# Patient Record
Sex: FEMALE | Race: WHITE | NOT HISPANIC OR LATINO | Employment: OTHER | URBAN - METROPOLITAN AREA
[De-identification: names, ages, dates, MRNs, and addresses within clinical notes are randomized per-mention and may not be internally consistent; named-entity substitution may affect disease eponyms.]

---

## 2018-07-17 ENCOUNTER — OFFICE VISIT (OUTPATIENT)
Dept: INTERNAL MEDICINE | Facility: CLINIC | Age: 83
End: 2018-07-17

## 2018-07-17 VITALS
SYSTOLIC BLOOD PRESSURE: 130 MMHG | HEART RATE: 72 BPM | HEIGHT: 60 IN | BODY MASS INDEX: 23.95 KG/M2 | WEIGHT: 122 LBS | DIASTOLIC BLOOD PRESSURE: 80 MMHG

## 2018-07-17 DIAGNOSIS — I10 ESSENTIAL HYPERTENSION: Primary | ICD-10-CM

## 2018-07-17 DIAGNOSIS — G25.0 BENIGN ESSENTIAL TREMOR: ICD-10-CM

## 2018-07-17 DIAGNOSIS — E55.9 VITAMIN D DEFICIENCY: ICD-10-CM

## 2018-07-17 DIAGNOSIS — K21.9 GASTROESOPHAGEAL REFLUX DISEASE WITHOUT ESOPHAGITIS: ICD-10-CM

## 2018-07-17 DIAGNOSIS — F32.A DEPRESSION, UNSPECIFIED DEPRESSION TYPE: ICD-10-CM

## 2018-07-17 DIAGNOSIS — K59.09 OTHER CONSTIPATION: ICD-10-CM

## 2018-07-17 DIAGNOSIS — G47.09 OTHER INSOMNIA: ICD-10-CM

## 2018-07-17 LAB
25(OH)D3 SERPL-MCNC: 30.3 NG/ML
ALBUMIN SERPL-MCNC: 4 G/DL (ref 3.2–4.8)
ALBUMIN/GLOB SERPL: 1.4 G/DL (ref 1.5–2.5)
ALP SERPL-CCNC: 80 U/L (ref 25–100)
ALT SERPL W P-5'-P-CCNC: 19 U/L (ref 7–40)
ANION GAP SERPL CALCULATED.3IONS-SCNC: 4 MMOL/L (ref 3–11)
AST SERPL-CCNC: 16 U/L (ref 0–33)
BILIRUB BLD-MCNC: NEGATIVE MG/DL
BILIRUB SERPL-MCNC: 0.3 MG/DL (ref 0.3–1.2)
BUN BLD-MCNC: 11 MG/DL (ref 9–23)
BUN/CREAT SERPL: 22.9 (ref 7–25)
CALCIUM SPEC-SCNC: 9 MG/DL (ref 8.7–10.4)
CHLORIDE SERPL-SCNC: 100 MMOL/L (ref 99–109)
CLARITY, POC: CLEAR
CO2 SERPL-SCNC: 30 MMOL/L (ref 20–31)
COLOR UR: YELLOW
CREAT BLD-MCNC: 0.48 MG/DL (ref 0.6–1.3)
DEPRECATED RDW RBC AUTO: 43.5 FL (ref 37–54)
ERYTHROCYTE [DISTWIDTH] IN BLOOD BY AUTOMATED COUNT: 13.3 % (ref 11.3–14.5)
GFR SERPL CREATININE-BSD FRML MDRD: 122 ML/MIN/1.73
GLOBULIN UR ELPH-MCNC: 2.8 GM/DL
GLUCOSE BLD-MCNC: 90 MG/DL (ref 70–100)
GLUCOSE UR STRIP-MCNC: NEGATIVE MG/DL
HCT VFR BLD AUTO: 41.9 % (ref 34.5–44)
HGB BLD-MCNC: 14.2 G/DL (ref 11.5–15.5)
KETONES UR QL: NEGATIVE
LEUKOCYTE EST, POC: NEGATIVE
MCH RBC QN AUTO: 30.3 PG (ref 27–31)
MCHC RBC AUTO-ENTMCNC: 33.9 G/DL (ref 32–36)
MCV RBC AUTO: 89.5 FL (ref 80–99)
NITRITE UR-MCNC: NEGATIVE MG/ML
PH UR: 8 [PH] (ref 5–8)
PLATELET # BLD AUTO: 354 10*3/MM3 (ref 150–450)
PMV BLD AUTO: 10.8 FL (ref 6–12)
POTASSIUM BLD-SCNC: 4.6 MMOL/L (ref 3.5–5.5)
PROT SERPL-MCNC: 6.8 G/DL (ref 5.7–8.2)
PROT UR STRIP-MCNC: NEGATIVE MG/DL
RBC # BLD AUTO: 4.68 10*6/MM3 (ref 3.89–5.14)
RBC # UR STRIP: NEGATIVE /UL
SODIUM BLD-SCNC: 134 MMOL/L (ref 132–146)
SP GR UR: 1.01 (ref 1–1.03)
TSH SERPL DL<=0.05 MIU/L-ACNC: 1.1 MIU/ML (ref 0.35–5.35)
UROBILINOGEN UR QL: NORMAL
VIT B12 BLD-MCNC: 652 PG/ML (ref 211–911)
WBC NRBC COR # BLD: 7.67 10*3/MM3 (ref 3.5–10.8)

## 2018-07-17 PROCEDURE — 82607 VITAMIN B-12: CPT | Performed by: INTERNAL MEDICINE

## 2018-07-17 PROCEDURE — 99204 OFFICE O/P NEW MOD 45 MIN: CPT | Performed by: INTERNAL MEDICINE

## 2018-07-17 PROCEDURE — 82306 VITAMIN D 25 HYDROXY: CPT | Performed by: INTERNAL MEDICINE

## 2018-07-17 PROCEDURE — 81003 URINALYSIS AUTO W/O SCOPE: CPT | Performed by: INTERNAL MEDICINE

## 2018-07-17 PROCEDURE — 85027 COMPLETE CBC AUTOMATED: CPT | Performed by: INTERNAL MEDICINE

## 2018-07-17 PROCEDURE — 80053 COMPREHEN METABOLIC PANEL: CPT | Performed by: INTERNAL MEDICINE

## 2018-07-17 PROCEDURE — 84443 ASSAY THYROID STIM HORMONE: CPT | Performed by: INTERNAL MEDICINE

## 2018-07-17 RX ORDER — TRAZODONE HYDROCHLORIDE 50 MG/1
TABLET ORAL NIGHTLY PRN
COMMUNITY
Start: 2018-06-20 | End: 2019-08-08 | Stop reason: SDUPTHER

## 2018-07-17 RX ORDER — BIMATOPROST 0.01 %
DROPS OPHTHALMIC (EYE)
COMMUNITY
Start: 2018-06-22 | End: 2020-11-03

## 2018-07-17 RX ORDER — ESCITALOPRAM OXALATE 20 MG/1
TABLET ORAL DAILY
COMMUNITY
Start: 2018-06-20 | End: 2018-10-22

## 2018-07-17 RX ORDER — DORZOLAMIDE HYDROCHLORIDE AND TIMOLOL MALEATE 20; 5 MG/ML; MG/ML
SOLUTION/ DROPS OPHTHALMIC
COMMUNITY
Start: 2018-05-07

## 2018-07-17 RX ORDER — PRIMIDONE 250 MG/1
TABLET ORAL
COMMUNITY
Start: 2018-07-02 | End: 2018-09-06 | Stop reason: SDUPTHER

## 2018-07-17 RX ORDER — PRIMIDONE 50 MG/1
TABLET ORAL NIGHTLY
COMMUNITY
Start: 2018-06-16 | End: 2018-09-11 | Stop reason: SDUPTHER

## 2018-07-17 RX ORDER — LORAZEPAM 0.5 MG/1
0.5 TABLET ORAL DAILY PRN
COMMUNITY
End: 2019-08-19

## 2018-07-17 RX ORDER — CLONAZEPAM 0.5 MG/1
0.5 TABLET ORAL NIGHTLY PRN
COMMUNITY
End: 2020-02-25 | Stop reason: SDUPTHER

## 2018-07-17 RX ORDER — AMLODIPINE BESYLATE 2.5 MG/1
TABLET ORAL DAILY
COMMUNITY
Start: 2018-07-05 | End: 2018-12-07 | Stop reason: SDUPTHER

## 2018-07-17 NOTE — PROGRESS NOTES
Patient is a 87 y.o. female who is here to establish care for gastric problems.  Chief Complaint   Patient presents with   • Establish Care     gastric problems         HPI:    Here to establish care.  Was seen by Dr Mancia.  Today c/o stomach problems.  Onset about 1.5 years ago when her  got sick.  Some constipation.  Using fiber with some improvement.  No emesis.  Some nausea.  No wt loss. No BRBPR.  Tries to drink more water during the day.  Has problems in the past with hemorrhoids.      History:    Patient Active Problem List   Diagnosis   • Gastroesophageal reflux disease without esophagitis   • Depression   • Essential hypertension   • Other insomnia   • Benign essential tremor   • Other constipation       Past Medical History:   Diagnosis Date   • Cystocele, midline    • Hemorrhoids        Past Surgical History:   Procedure Laterality Date   • BREAST BIOPSY      cyst removed   • CATARACT EXTRACTION Bilateral 2005   • THYROID SURGERY      nodule removal, Benign       No current outpatient prescriptions on file prior to visit.     No current facility-administered medications on file prior to visit.        Family History   Problem Relation Age of Onset   • Diabetes Mother    • Heart attack Father    • Diabetes Sister    • Breast cancer Daughter        Social History     Social History   • Marital status:      Spouse name: N/A   • Number of children: N/A   • Years of education: N/A     Occupational History   • Not on file.     Social History Main Topics   • Smoking status: Not on file   • Smokeless tobacco: Not on file   • Alcohol use Not on file   • Drug use: Unknown   • Sexual activity: Not on file     Other Topics Concern   • Not on file     Social History Narrative   • No narrative on file         Review of Systems   Constitutional: Negative for chills, fatigue, fever and unexpected weight change.   HENT: Negative for congestion, ear pain, hearing loss, rhinorrhea, sinus pressure, sore throat  "and trouble swallowing.    Eyes: Negative for discharge and itching.   Respiratory: Negative for cough, chest tightness and shortness of breath.    Cardiovascular: Negative for chest pain, palpitations and leg swelling.   Gastrointestinal: Positive for constipation. Negative for abdominal pain, blood in stool, diarrhea and vomiting.        Colonoscopy 2013 normal   Endocrine: Positive for cold intolerance. Negative for polydipsia and polyuria.   Genitourinary: Positive for frequency. Negative for difficulty urinating, dysuria, enuresis, hematuria and urgency.   Musculoskeletal: Positive for arthralgias. Negative for back pain, gait problem and joint swelling.   Skin: Negative for rash and wound.   Allergic/Immunologic: Negative for immunocompromised state.   Neurological: Positive for numbness. Negative for dizziness, syncope, weakness, light-headedness and headaches.   Hematological: Does not bruise/bleed easily.   Psychiatric/Behavioral: Positive for dysphoric mood. Negative for behavioral problems and sleep disturbance. The patient is nervous/anxious.        /80   Pulse 72   Ht 152.4 cm (60\")   Wt 55.3 kg (122 lb)   BMI 23.83 kg/m²       Physical Exam   Constitutional: She is oriented to person, place, and time. She appears well-developed and well-nourished.   HENT:   Head: Normocephalic and atraumatic.   Right Ear: External ear normal.   Left Ear: External ear normal.   Mouth/Throat: Oropharynx is clear and moist.   Eyes: Conjunctivae and EOM are normal.   Neck: Normal range of motion. Neck supple.   Cardiovascular: Normal rate, regular rhythm and normal heart sounds.    Pulmonary/Chest: Effort normal and breath sounds normal.   Abdominal: Soft. Bowel sounds are normal.   Musculoskeletal: Normal range of motion. She exhibits edema (trace) and deformity (kyphosis).   Lymphadenopathy:     She has no cervical adenopathy.   Neurological: She is alert and oriented to person, place, and time.   Skin: Skin is " warm and dry.   Psychiatric: She has a normal mood and affect. Her behavior is normal. Thought content normal.       Procedure:      Discussion/Summary:    HTN-stable  Constipation-add citrucel qd  GERD-cont zantac  Tremor-labs today  Depression-stable on current tx  Insomnia-stable  High risk meds-labs today    Labs noted and dw patient, advised to start Vit D 2000 IU qd        Current Outpatient Prescriptions:   •  amLODIPine (NORVASC) 2.5 MG tablet, Daily., Disp: , Rfl:   •  clonazePAM (KlonoPIN) 0.5 MG tablet, Take 0.5 mg by mouth At Night As Needed for Anxiety or Seizures., Disp: , Rfl:   •  dorzolamide-timolol (COSOPT) 22.3-6.8 MG/ML ophthalmic solution, , Disp: , Rfl:   •  escitalopram (LEXAPRO) 20 MG tablet, Daily., Disp: , Rfl:   •  LORazepam (ATIVAN) 0.5 MG tablet, Take 0.5 mg by mouth Daily As Needed for Anxiety., Disp: , Rfl:   •  LUMIGAN 0.01 % ophthalmic drops, , Disp: , Rfl:   •  primidone (MYSOLINE) 250 MG tablet, , Disp: , Rfl:   •  primidone (MYSOLINE) 50 MG tablet, Every Night., Disp: , Rfl:   •  traZODone (DESYREL) 50 MG tablet, At Night As Needed., Disp: , Rfl:         Chula was seen today for establish care.    Diagnoses and all orders for this visit:    Essential hypertension  -     CBC (No Diff)  -     Comprehensive Metabolic Panel  -     TSH  -     POC Urinalysis Dipstick, Automated    Gastroesophageal reflux disease without esophagitis    Other constipation    Benign essential tremor    Depression, unspecified depression type  -     TSH  -     Vitamin B12    Other insomnia    Vitamin D deficiency  -     Vitamin D 25 Hydroxy

## 2018-08-15 ENCOUNTER — TELEPHONE (OUTPATIENT)
Dept: INTERNAL MEDICINE | Facility: CLINIC | Age: 83
End: 2018-08-15

## 2018-09-06 RX ORDER — PRIMIDONE 250 MG/1
250 TABLET ORAL DAILY
Qty: 90 TABLET | Refills: 2 | Status: SHIPPED | OUTPATIENT
Start: 2018-09-06 | End: 2019-06-05 | Stop reason: SDUPTHER

## 2018-09-11 ENCOUNTER — TELEPHONE (OUTPATIENT)
Dept: INTERNAL MEDICINE | Facility: CLINIC | Age: 83
End: 2018-09-11

## 2018-09-11 RX ORDER — PRIMIDONE 50 MG/1
50 TABLET ORAL 2 TIMES DAILY
Qty: 180 TABLET | Refills: 2 | Status: SHIPPED | OUTPATIENT
Start: 2018-09-11 | End: 2019-06-05 | Stop reason: SDUPTHER

## 2018-10-22 ENCOUNTER — OFFICE VISIT (OUTPATIENT)
Dept: INTERNAL MEDICINE | Facility: CLINIC | Age: 83
End: 2018-10-22

## 2018-10-22 VITALS
DIASTOLIC BLOOD PRESSURE: 70 MMHG | SYSTOLIC BLOOD PRESSURE: 144 MMHG | BODY MASS INDEX: 23.95 KG/M2 | HEART RATE: 64 BPM | HEIGHT: 60 IN | WEIGHT: 122 LBS

## 2018-10-22 DIAGNOSIS — G47.09 OTHER INSOMNIA: ICD-10-CM

## 2018-10-22 DIAGNOSIS — K21.9 GASTROESOPHAGEAL REFLUX DISEASE WITHOUT ESOPHAGITIS: ICD-10-CM

## 2018-10-22 DIAGNOSIS — I10 ESSENTIAL HYPERTENSION: Primary | ICD-10-CM

## 2018-10-22 DIAGNOSIS — G25.0 BENIGN ESSENTIAL TREMOR: ICD-10-CM

## 2018-10-22 DIAGNOSIS — K59.09 OTHER CONSTIPATION: ICD-10-CM

## 2018-10-22 DIAGNOSIS — R60.0 BILATERAL LEG EDEMA: ICD-10-CM

## 2018-10-22 DIAGNOSIS — F32.A DEPRESSION, UNSPECIFIED DEPRESSION TYPE: ICD-10-CM

## 2018-10-22 PROCEDURE — 99214 OFFICE O/P EST MOD 30 MIN: CPT | Performed by: INTERNAL MEDICINE

## 2018-10-22 RX ORDER — CITALOPRAM 40 MG/1
TABLET ORAL
COMMUNITY
Start: 2018-10-08 | End: 2020-04-08 | Stop reason: SDUPTHER

## 2018-10-22 RX ORDER — HYDROCHLOROTHIAZIDE 12.5 MG/1
12.5 TABLET ORAL EVERY MORNING
Qty: 30 TABLET | Refills: 5 | Status: SHIPPED | OUTPATIENT
Start: 2018-10-22 | End: 2019-04-08

## 2018-10-22 RX ORDER — ARIPIPRAZOLE 2 MG/1
TABLET ORAL
COMMUNITY
Start: 2018-10-08 | End: 2020-02-24 | Stop reason: SDUPTHER

## 2018-10-22 NOTE — PROGRESS NOTES
Patient is a 87 y.o. female who is here for a follow up of chronic conditions.  Chief Complaint   Patient presents with   • Hypertension         HPI:    Here for f/u.  Had to go to the fiber pills.  Still with some feeling incomplete emptying.  No nausea or emesis.  Some ankle edema.  No dizziness or lightheadedness.  About the same at the end of the day.  Elevation helps.  Depression is stable.  GERD is controlled.     History:    Patient Active Problem List   Diagnosis   • Gastroesophageal reflux disease without esophagitis   • Depression   • Essential hypertension   • Other insomnia   • Benign essential tremor   • Other constipation   • Bilateral leg edema       Past Medical History:   Diagnosis Date   • Cystocele, midline    • Hemorrhoids        Past Surgical History:   Procedure Laterality Date   • BREAST BIOPSY      cyst removed   • CATARACT EXTRACTION Bilateral 2005   • THYROID SURGERY      nodule removal, Benign       Current Outpatient Prescriptions on File Prior to Visit   Medication Sig   • amLODIPine (NORVASC) 2.5 MG tablet Daily.   • clonazePAM (KlonoPIN) 0.5 MG tablet Take 0.5 mg by mouth At Night As Needed for Anxiety or Seizures.   • dorzolamide-timolol (COSOPT) 22.3-6.8 MG/ML ophthalmic solution    • LORazepam (ATIVAN) 0.5 MG tablet Take 0.5 mg by mouth Daily As Needed for Anxiety.   • LUMIGAN 0.01 % ophthalmic drops    • primidone (MYSOLINE) 250 MG tablet Take 1 tablet by mouth Daily.   • primidone (MYSOLINE) 50 MG tablet Take 1 tablet by mouth 2 (Two) Times a Day.   • traZODone (DESYREL) 50 MG tablet At Night As Needed.   • [DISCONTINUED] escitalopram (LEXAPRO) 20 MG tablet Daily.     No current facility-administered medications on file prior to visit.        Family History   Problem Relation Age of Onset   • Diabetes Mother    • Heart attack Father    • Diabetes Sister    • Breast cancer Daughter        Social History     Social History   • Marital status:      Spouse name: N/A   • Number  "of children: N/A   • Years of education: N/A     Occupational History   • Not on file.     Social History Main Topics   • Smoking status: Former Smoker   • Smokeless tobacco: Never Used   • Alcohol use Not on file   • Drug use: Unknown   • Sexual activity: Not on file     Other Topics Concern   • Not on file     Social History Narrative   • No narrative on file         Review of Systems   Constitutional: Negative for chills, fatigue, fever and unexpected weight change.   HENT: Negative for congestion, ear pain, hearing loss, rhinorrhea, sinus pressure, sore throat and trouble swallowing.    Eyes: Negative for discharge and itching.   Respiratory: Negative for cough, chest tightness and shortness of breath.    Cardiovascular: Positive for leg swelling. Negative for chest pain and palpitations.   Gastrointestinal: Positive for constipation. Negative for abdominal pain, blood in stool, diarrhea and vomiting.        Colonoscopy 2011 normal   Endocrine: Positive for cold intolerance. Negative for polydipsia and polyuria.   Genitourinary: Positive for frequency. Negative for difficulty urinating, dysuria, enuresis, hematuria and urgency.   Musculoskeletal: Positive for arthralgias. Negative for back pain, gait problem and joint swelling.   Skin: Negative for rash and wound.   Allergic/Immunologic: Negative for immunocompromised state.   Neurological: Positive for numbness. Negative for dizziness, syncope, weakness, light-headedness and headaches.   Hematological: Does not bruise/bleed easily.   Psychiatric/Behavioral: Positive for dysphoric mood. Negative for behavioral problems and sleep disturbance. The patient is nervous/anxious.        /70   Pulse 64   Ht 152.4 cm (60\")   Wt 55.3 kg (122 lb)   BMI 23.83 kg/m²       Physical Exam   Constitutional: She is oriented to person, place, and time. She appears well-developed and well-nourished.   HENT:   Head: Normocephalic and atraumatic.   Right Ear: External ear " normal.   Left Ear: External ear normal.   Mouth/Throat: Oropharynx is clear and moist.   Eyes: Conjunctivae and EOM are normal.   Neck: Normal range of motion. Neck supple.   Cardiovascular: Normal rate, regular rhythm and normal heart sounds.    Pulmonary/Chest: Effort normal and breath sounds normal.   Abdominal: Soft. Bowel sounds are normal.   Musculoskeletal: Normal range of motion. She exhibits deformity (kyphosis).   Lymphadenopathy:     She has no cervical adenopathy.   Neurological: She is alert and oriented to person, place, and time.   Skin: Skin is warm and dry.   Psychiatric: She has a normal mood and affect. Her behavior is normal. Thought content normal.       Procedure:      Discussion/Summary:    HTN/edema-add hctz  Constipation-change citrucel qoam  GERD-cont zantac  Tremor-labs soon  Depression-stable on current tx  Insomnia-stable  Vit d def-cont replacement, recheck on rtc  High risk meds-labs on rtc     Labs noted and dw patient    Current Outpatient Prescriptions:   •  amLODIPine (NORVASC) 2.5 MG tablet, Daily., Disp: , Rfl:   •  ARIPiprazole (ABILIFY) 2 MG tablet, , Disp: , Rfl:   •  citalopram (CeleXA) 40 MG tablet, , Disp: , Rfl:   •  clonazePAM (KlonoPIN) 0.5 MG tablet, Take 0.5 mg by mouth At Night As Needed for Anxiety or Seizures., Disp: , Rfl:   •  dorzolamide-timolol (COSOPT) 22.3-6.8 MG/ML ophthalmic solution, , Disp: , Rfl:   •  LORazepam (ATIVAN) 0.5 MG tablet, Take 0.5 mg by mouth Daily As Needed for Anxiety., Disp: , Rfl:   •  LUMIGAN 0.01 % ophthalmic drops, , Disp: , Rfl:   •  primidone (MYSOLINE) 250 MG tablet, Take 1 tablet by mouth Daily., Disp: 90 tablet, Rfl: 2  •  primidone (MYSOLINE) 50 MG tablet, Take 1 tablet by mouth 2 (Two) Times a Day., Disp: 180 tablet, Rfl: 2  •  traZODone (DESYREL) 50 MG tablet, At Night As Needed., Disp: , Rfl:   •  hydrochlorothiazide (HYDRODIURIL) 12.5 MG tablet, Take 1 tablet by mouth Every Morning., Disp: 30 tablet, Rfl: 5        Chula masters  seen today for hypertension.    Diagnoses and all orders for this visit:    Essential hypertension  -     hydrochlorothiazide (HYDRODIURIL) 12.5 MG tablet; Take 1 tablet by mouth Every Morning.    Gastroesophageal reflux disease without esophagitis    Other constipation    Benign essential tremor    Depression, unspecified depression type    Other insomnia    Bilateral leg edema  -     hydrochlorothiazide (HYDRODIURIL) 12.5 MG tablet; Take 1 tablet by mouth Every Morning.

## 2018-12-04 ENCOUNTER — OFFICE VISIT (OUTPATIENT)
Dept: INTERNAL MEDICINE | Facility: CLINIC | Age: 83
End: 2018-12-04

## 2018-12-04 VITALS
HEIGHT: 60 IN | HEART RATE: 68 BPM | DIASTOLIC BLOOD PRESSURE: 74 MMHG | BODY MASS INDEX: 23.95 KG/M2 | SYSTOLIC BLOOD PRESSURE: 124 MMHG | WEIGHT: 122 LBS

## 2018-12-04 DIAGNOSIS — K59.09 OTHER CONSTIPATION: ICD-10-CM

## 2018-12-04 DIAGNOSIS — G47.09 OTHER INSOMNIA: ICD-10-CM

## 2018-12-04 DIAGNOSIS — G25.0 BENIGN ESSENTIAL TREMOR: ICD-10-CM

## 2018-12-04 DIAGNOSIS — I10 ESSENTIAL HYPERTENSION: Primary | ICD-10-CM

## 2018-12-04 DIAGNOSIS — K21.9 GASTROESOPHAGEAL REFLUX DISEASE WITHOUT ESOPHAGITIS: ICD-10-CM

## 2018-12-04 PROCEDURE — 99214 OFFICE O/P EST MOD 30 MIN: CPT | Performed by: INTERNAL MEDICINE

## 2018-12-07 ENCOUNTER — TELEPHONE (OUTPATIENT)
Dept: INTERNAL MEDICINE | Facility: CLINIC | Age: 83
End: 2018-12-07

## 2018-12-07 RX ORDER — AMLODIPINE BESYLATE 2.5 MG/1
2.5 TABLET ORAL DAILY
Qty: 90 TABLET | Refills: 1 | Status: SHIPPED | OUTPATIENT
Start: 2018-12-07 | End: 2019-05-06 | Stop reason: SDUPTHER

## 2019-04-08 ENCOUNTER — OFFICE VISIT (OUTPATIENT)
Dept: INTERNAL MEDICINE | Facility: CLINIC | Age: 84
End: 2019-04-08

## 2019-04-08 VITALS
SYSTOLIC BLOOD PRESSURE: 140 MMHG | HEIGHT: 60 IN | DIASTOLIC BLOOD PRESSURE: 80 MMHG | WEIGHT: 122 LBS | BODY MASS INDEX: 23.95 KG/M2 | HEART RATE: 64 BPM

## 2019-04-08 DIAGNOSIS — G47.09 OTHER INSOMNIA: ICD-10-CM

## 2019-04-08 DIAGNOSIS — K21.9 GASTROESOPHAGEAL REFLUX DISEASE WITHOUT ESOPHAGITIS: ICD-10-CM

## 2019-04-08 DIAGNOSIS — I10 ESSENTIAL HYPERTENSION: Primary | ICD-10-CM

## 2019-04-08 DIAGNOSIS — G25.0 BENIGN ESSENTIAL TREMOR: ICD-10-CM

## 2019-04-08 DIAGNOSIS — F32.A DEPRESSION, UNSPECIFIED DEPRESSION TYPE: ICD-10-CM

## 2019-04-08 DIAGNOSIS — K59.09 OTHER CONSTIPATION: ICD-10-CM

## 2019-04-08 PROBLEM — R60.0 BILATERAL LEG EDEMA: Status: RESOLVED | Noted: 2018-10-22 | Resolved: 2019-04-08

## 2019-04-08 PROCEDURE — 99214 OFFICE O/P EST MOD 30 MIN: CPT | Performed by: INTERNAL MEDICINE

## 2019-04-08 NOTE — PROGRESS NOTES
Patient is a 87 y.o. female who is here for a follow up of chronic conditions.  Chief Complaint   Patient presents with   • Hypertension         HPI:    Here for f/u.  Doing about the same.  Still feels anxious.  GERD is controlled.  No further ankle edema.  No dizziness or lightheadedness.  Some constipation.  Sleeping good.  No HAs.  No nausea or emesis.    History:    Patient Active Problem List   Diagnosis   • Gastroesophageal reflux disease without esophagitis   • Depression   • Essential hypertension   • Other insomnia   • Benign essential tremor   • Other constipation       Past Medical History:   Diagnosis Date   • Cystocele, midline    • Hemorrhoids        Past Surgical History:   Procedure Laterality Date   • BREAST BIOPSY      cyst removed   • CATARACT EXTRACTION Bilateral 2005   • THYROID SURGERY      nodule removal, Benign       Current Outpatient Medications on File Prior to Visit   Medication Sig   • amLODIPine (NORVASC) 2.5 MG tablet Take 1 tablet by mouth Daily.   • ARIPiprazole (ABILIFY) 2 MG tablet    • citalopram (CeleXA) 40 MG tablet    • clonazePAM (KlonoPIN) 0.5 MG tablet Take 0.5 mg by mouth At Night As Needed for Anxiety.   • dorzolamide-timolol (COSOPT) 22.3-6.8 MG/ML ophthalmic solution    • LORazepam (ATIVAN) 0.5 MG tablet Take 0.5 mg by mouth Daily As Needed for Anxiety.   • LUMIGAN 0.01 % ophthalmic drops    • primidone (MYSOLINE) 250 MG tablet Take 1 tablet by mouth Daily.   • primidone (MYSOLINE) 50 MG tablet Take 1 tablet by mouth 2 (Two) Times a Day.   • traZODone (DESYREL) 50 MG tablet At Night As Needed.   • [DISCONTINUED] hydrochlorothiazide (HYDRODIURIL) 12.5 MG tablet Take 1 tablet by mouth Every Morning.     No current facility-administered medications on file prior to visit.        Family History   Problem Relation Age of Onset   • Diabetes Mother    • Heart attack Father    • Diabetes Sister    • Breast cancer Daughter        Social History     Socioeconomic History   •  "Marital status:      Spouse name: Not on file   • Number of children: Not on file   • Years of education: Not on file   • Highest education level: Not on file   Tobacco Use   • Smoking status: Former Smoker   • Smokeless tobacco: Never Used         Review of Systems   Constitutional: Negative for chills, fatigue, fever and unexpected weight change.   HENT: Negative for congestion, ear pain, hearing loss, rhinorrhea, sinus pressure, sore throat and trouble swallowing.    Eyes: Negative for discharge and itching.   Respiratory: Negative for cough, chest tightness and shortness of breath.    Cardiovascular: Positive for leg swelling. Negative for chest pain and palpitations.   Gastrointestinal: Negative for abdominal pain, blood in stool, diarrhea and vomiting.        Colonoscopy 2011 normal   Endocrine: Positive for cold intolerance. Negative for polydipsia and polyuria.   Genitourinary: Positive for frequency. Negative for difficulty urinating, dysuria, enuresis, hematuria and urgency.   Musculoskeletal: Positive for arthralgias. Negative for back pain, gait problem and joint swelling.   Skin: Negative for rash and wound.   Allergic/Immunologic: Negative for immunocompromised state.   Neurological: Positive for numbness. Negative for dizziness, syncope, weakness, light-headedness and headaches.   Hematological: Does not bruise/bleed easily.   Psychiatric/Behavioral: Positive for dysphoric mood. Negative for behavioral problems and sleep disturbance. The patient is nervous/anxious.        /80   Pulse 64   Ht 152.4 cm (60\")   Wt 55.3 kg (122 lb)   BMI 23.83 kg/m²       Physical Exam   Constitutional: She is oriented to person, place, and time. She appears well-developed and well-nourished.   HENT:   Head: Normocephalic and atraumatic.   Right Ear: External ear normal.   Left Ear: External ear normal.   Mouth/Throat: Oropharynx is clear and moist.   Eyes: Conjunctivae and EOM are normal.   Neck: Normal " range of motion. Neck supple.   Cardiovascular: Normal rate, regular rhythm and normal heart sounds.   Pulmonary/Chest: Effort normal and breath sounds normal.   Abdominal: Soft. Bowel sounds are normal.   Musculoskeletal: Normal range of motion. She exhibits deformity (kyphosis).   Lymphadenopathy:     She has no cervical adenopathy.   Neurological: She is alert and oriented to person, place, and time.   Skin: Skin is warm and dry.   Psychiatric: She has a normal mood and affect. Her behavior is normal. Thought content normal.       Procedure:      Discussion/Summary:    HTN/edema-advised to monitor  Constipation-improved  GERD-cont zantac  Tremor-stable  Depression-stable on current tx  Insomnia-stable  Vit d def-cont replacement, recheck on rtc  High risk meds-labs on rtc     Labs noted and dw patient, has already had the flu shot        Current Outpatient Medications:   •  amLODIPine (NORVASC) 2.5 MG tablet, Take 1 tablet by mouth Daily., Disp: 90 tablet, Rfl: 1  •  ARIPiprazole (ABILIFY) 2 MG tablet, , Disp: , Rfl:   •  citalopram (CeleXA) 40 MG tablet, , Disp: , Rfl:   •  clonazePAM (KlonoPIN) 0.5 MG tablet, Take 0.5 mg by mouth At Night As Needed for Anxiety., Disp: , Rfl:   •  dorzolamide-timolol (COSOPT) 22.3-6.8 MG/ML ophthalmic solution, , Disp: , Rfl:   •  LORazepam (ATIVAN) 0.5 MG tablet, Take 0.5 mg by mouth Daily As Needed for Anxiety., Disp: , Rfl:   •  LUMIGAN 0.01 % ophthalmic drops, , Disp: , Rfl:   •  primidone (MYSOLINE) 250 MG tablet, Take 1 tablet by mouth Daily., Disp: 90 tablet, Rfl: 2  •  primidone (MYSOLINE) 50 MG tablet, Take 1 tablet by mouth 2 (Two) Times a Day., Disp: 180 tablet, Rfl: 2  •  traZODone (DESYREL) 50 MG tablet, At Night As Needed., Disp: , Rfl:         Chula was seen today for hypertension.    Diagnoses and all orders for this visit:    Essential hypertension    Other constipation    Gastroesophageal reflux disease without esophagitis    Benign essential tremor    Other  insomnia    Depression, unspecified depression type

## 2019-05-07 RX ORDER — AMLODIPINE BESYLATE 2.5 MG/1
TABLET ORAL
Qty: 18 TABLET | Refills: 0 | Status: SHIPPED | OUTPATIENT
Start: 2019-05-07 | End: 2019-06-05 | Stop reason: SDUPTHER

## 2019-06-06 RX ORDER — PRIMIDONE 50 MG/1
TABLET ORAL
Qty: 60 TABLET | Refills: 1 | Status: SHIPPED | OUTPATIENT
Start: 2019-06-06 | End: 2019-07-31 | Stop reason: SDUPTHER

## 2019-06-06 RX ORDER — PRIMIDONE 250 MG/1
TABLET ORAL
Qty: 30 TABLET | Refills: 1 | Status: SHIPPED | OUTPATIENT
Start: 2019-06-06 | End: 2019-07-27 | Stop reason: SDUPTHER

## 2019-06-06 RX ORDER — AMLODIPINE BESYLATE 2.5 MG/1
TABLET ORAL
Qty: 18 TABLET | Refills: 0 | Status: SHIPPED | OUTPATIENT
Start: 2019-06-06 | End: 2019-06-27 | Stop reason: SDUPTHER

## 2019-06-28 RX ORDER — AMLODIPINE BESYLATE 2.5 MG/1
TABLET ORAL
Qty: 30 TABLET | Refills: 2 | Status: SHIPPED | OUTPATIENT
Start: 2019-06-28 | End: 2019-08-30 | Stop reason: SDUPTHER

## 2019-07-29 RX ORDER — PRIMIDONE 250 MG/1
TABLET ORAL
Qty: 30 TABLET | Refills: 5 | Status: SHIPPED | OUTPATIENT
Start: 2019-07-29 | End: 2019-08-19

## 2019-07-31 RX ORDER — PRIMIDONE 50 MG/1
TABLET ORAL
Qty: 60 TABLET | Refills: 0 | Status: SHIPPED | OUTPATIENT
Start: 2019-07-31 | End: 2019-08-19 | Stop reason: SDUPTHER

## 2019-08-08 RX ORDER — TRAZODONE HYDROCHLORIDE 50 MG/1
50 TABLET ORAL NIGHTLY
Qty: 30 TABLET | Refills: 2 | Status: SHIPPED | OUTPATIENT
Start: 2019-08-08 | End: 2019-12-27

## 2019-08-12 ENCOUNTER — TELEPHONE (OUTPATIENT)
Dept: INTERNAL MEDICINE | Facility: CLINIC | Age: 84
End: 2019-08-12

## 2019-08-12 NOTE — TELEPHONE ENCOUNTER
DR. CHAD MULLINS CALLED IN HER TRAZADONE AND SHE DOESN'T WANT HIM TO. DR. ECHAVARRIA CALLS THAT IN FOR HER.

## 2019-08-19 ENCOUNTER — OFFICE VISIT (OUTPATIENT)
Dept: INTERNAL MEDICINE | Facility: CLINIC | Age: 84
End: 2019-08-19

## 2019-08-19 VITALS
BODY MASS INDEX: 24.35 KG/M2 | SYSTOLIC BLOOD PRESSURE: 130 MMHG | HEIGHT: 60 IN | HEART RATE: 72 BPM | WEIGHT: 124 LBS | DIASTOLIC BLOOD PRESSURE: 70 MMHG

## 2019-08-19 DIAGNOSIS — G25.0 BENIGN ESSENTIAL TREMOR: ICD-10-CM

## 2019-08-19 DIAGNOSIS — I10 ESSENTIAL HYPERTENSION: Primary | ICD-10-CM

## 2019-08-19 DIAGNOSIS — K21.9 GASTROESOPHAGEAL REFLUX DISEASE WITHOUT ESOPHAGITIS: ICD-10-CM

## 2019-08-19 DIAGNOSIS — F32.89 OTHER DEPRESSION: ICD-10-CM

## 2019-08-19 DIAGNOSIS — K59.09 OTHER CONSTIPATION: ICD-10-CM

## 2019-08-19 DIAGNOSIS — G47.09 OTHER INSOMNIA: ICD-10-CM

## 2019-08-19 PROCEDURE — 99214 OFFICE O/P EST MOD 30 MIN: CPT | Performed by: INTERNAL MEDICINE

## 2019-08-19 RX ORDER — PRIMIDONE 50 MG/1
TABLET ORAL
Qty: 120 TABLET | Refills: 5 | Status: SHIPPED | OUTPATIENT
Start: 2019-08-19 | End: 2020-03-30 | Stop reason: SDUPTHER

## 2019-08-19 NOTE — PROGRESS NOTES
Patient is a 88 y.o. female who is here for a follow up of chronic conditions.  Chief Complaint   Patient presents with   • Hypertension         HPI:    Here for f/u.  Stills feels depressed.  Having problems with constipation.  Not compliant with citrucel.  No dizziness or lightheadedness.  Sleeping is not the best.  Appetite is fair.  No frequent HAs. GERD is controlled.      History:     Patient Active Problem List   Diagnosis   • Gastroesophageal reflux disease without esophagitis   • Depression   • Essential hypertension   • Other insomnia   • Benign essential tremor   • Other constipation       Past Medical History:   Diagnosis Date   • Cystocele, midline    • Hemorrhoids        Past Surgical History:   Procedure Laterality Date   • BREAST BIOPSY      cyst removed   • CATARACT EXTRACTION Bilateral 2005   • THYROID SURGERY      nodule removal, Benign       Current Outpatient Medications on File Prior to Visit   Medication Sig   • amLODIPine (NORVASC) 2.5 MG tablet TAKE ONE TABLET BY MOUTH DAILY   • ARIPiprazole (ABILIFY) 2 MG tablet    • citalopram (CeleXA) 40 MG tablet    • clonazePAM (KlonoPIN) 0.5 MG tablet Take 0.5 mg by mouth At Night As Needed for Anxiety.   • dorzolamide-timolol (COSOPT) 22.3-6.8 MG/ML ophthalmic solution    • LUMIGAN 0.01 % ophthalmic drops    • traZODone (DESYREL) 50 MG tablet Take 1 tablet by mouth Every Night.   • [DISCONTINUED] primidone (MYSOLINE) 250 MG tablet TAKE ONE TABLET BY MOUTH DAILY   • [DISCONTINUED] primidone (MYSOLINE) 50 MG tablet TAKE ONE TABLET BY MOUTH TWICE A DAY   • [DISCONTINUED] LORazepam (ATIVAN) 0.5 MG tablet Take 0.5 mg by mouth Daily As Needed for Anxiety.     No current facility-administered medications on file prior to visit.        Family History   Problem Relation Age of Onset   • Diabetes Mother    • Heart attack Father    • Diabetes Sister    • Breast cancer Daughter        Social History     Socioeconomic History   • Marital status:      Spouse  "name: Not on file   • Number of children: Not on file   • Years of education: Not on file   • Highest education level: Not on file   Tobacco Use   • Smoking status: Former Smoker   • Smokeless tobacco: Never Used         Review of Systems   Constitutional: Negative for chills, fatigue, fever and unexpected weight change.   HENT: Negative for congestion, ear pain, hearing loss, rhinorrhea, sinus pressure, sore throat and trouble swallowing.    Eyes: Negative for discharge and itching.   Respiratory: Negative for cough, chest tightness and shortness of breath.    Cardiovascular: Positive for leg swelling. Negative for chest pain and palpitations.   Gastrointestinal: Negative for abdominal pain, blood in stool, diarrhea and vomiting.        Colonoscopy 2011 normal   Endocrine: Positive for cold intolerance. Negative for polydipsia and polyuria.   Genitourinary: Positive for frequency. Negative for difficulty urinating, dysuria, enuresis, hematuria and urgency.   Musculoskeletal: Positive for arthralgias. Negative for back pain, gait problem and joint swelling.   Skin: Negative for rash and wound.   Allergic/Immunologic: Negative for immunocompromised state.   Neurological: Positive for tremors and numbness. Negative for dizziness, syncope, weakness, light-headedness and headaches.   Hematological: Does not bruise/bleed easily.   Psychiatric/Behavioral: Positive for dysphoric mood. Negative for behavioral problems and sleep disturbance. The patient is nervous/anxious.        /70 (BP Location: Left arm, Patient Position: Sitting)   Pulse 72   Ht 152.4 cm (60\")   Wt 56.2 kg (124 lb)   BMI 24.22 kg/m²       Physical Exam   Constitutional: She is oriented to person, place, and time. She appears well-developed and well-nourished.   HENT:   Head: Normocephalic and atraumatic.   Right Ear: External ear normal.   Left Ear: External ear normal.   Mouth/Throat: Oropharynx is clear and moist.   Eyes: Conjunctivae and EOM " are normal.   Neck: Normal range of motion. Neck supple.   Cardiovascular: Normal rate, regular rhythm and normal heart sounds.   Pulmonary/Chest: Effort normal and breath sounds normal.   Abdominal: Soft. Bowel sounds are normal.   Musculoskeletal: Normal range of motion. She exhibits edema and deformity (kyphosis).   Lymphadenopathy:     She has no cervical adenopathy.   Neurological: She is alert and oriented to person, place, and time.   Skin: Skin is warm and dry.   Psychiatric: She has a normal mood and affect. Her behavior is normal. Thought content normal.       Procedure:      Discussion/Summary:    HTN/edema-advised to monitor, stable  Constipation-advised to be compliant with citrucel  GERD-cont zantac, stable  Tremor-wean down the primidone  Depression-still an issue  Insomnia-stable on trazodone  Vit d def-cont replacement, recheck on rtc  High risk meds-labs on rtc     Labs noted and dw patient        Current Outpatient Medications:   •  amLODIPine (NORVASC) 2.5 MG tablet, TAKE ONE TABLET BY MOUTH DAILY, Disp: 30 tablet, Rfl: 2  •  ARIPiprazole (ABILIFY) 2 MG tablet, , Disp: , Rfl:   •  citalopram (CeleXA) 40 MG tablet, , Disp: , Rfl:   •  clonazePAM (KlonoPIN) 0.5 MG tablet, Take 0.5 mg by mouth At Night As Needed for Anxiety., Disp: , Rfl:   •  dorzolamide-timolol (COSOPT) 22.3-6.8 MG/ML ophthalmic solution, , Disp: , Rfl:   •  LUMIGAN 0.01 % ophthalmic drops, , Disp: , Rfl:   •  primidone (MYSOLINE) 50 MG tablet, 1 in am, 1 at noon, and 2 at bedtime, Disp: 120 tablet, Rfl: 5  •  traZODone (DESYREL) 50 MG tablet, Take 1 tablet by mouth Every Night., Disp: 30 tablet, Rfl: 2        Chula was seen today for hypertension.    Diagnoses and all orders for this visit:    Essential hypertension    Other constipation    Gastroesophageal reflux disease without esophagitis    Benign essential tremor  -     primidone (MYSOLINE) 50 MG tablet; 1 in am, 1 at noon, and 2 at bedtime    Other insomnia    Other  depression

## 2019-08-30 RX ORDER — AMLODIPINE BESYLATE 2.5 MG/1
TABLET ORAL
Qty: 30 TABLET | Refills: 1 | Status: SHIPPED | OUTPATIENT
Start: 2019-08-30 | End: 2019-11-19 | Stop reason: SDUPTHER

## 2019-09-13 ENCOUNTER — TELEPHONE (OUTPATIENT)
Dept: INTERNAL MEDICINE | Facility: CLINIC | Age: 84
End: 2019-09-13

## 2019-09-13 DIAGNOSIS — R26.81 GAIT INSTABILITY: ICD-10-CM

## 2019-09-13 DIAGNOSIS — R29.898 LEG WEAKNESS, BILATERAL: Primary | ICD-10-CM

## 2019-09-13 NOTE — TELEPHONE ENCOUNTER
PT CALLED STATING DR RUCKER WAS GOING TO SET HER UP FOR PHYSICAL THERAPY.    SHE WAS ALSO WANTING TO CUT HER PRIMIDONE FROM 2 TABLETS TO 1. \    Ok to cut dose  What is PT for

## 2019-10-03 ENCOUNTER — OUTSIDE FACILITY SERVICE (OUTPATIENT)
Dept: INTERNAL MEDICINE | Facility: CLINIC | Age: 84
End: 2019-10-03

## 2019-10-03 PROCEDURE — G0180 MD CERTIFICATION HHA PATIENT: HCPCS | Performed by: INTERNAL MEDICINE

## 2019-11-05 RX ORDER — AMLODIPINE BESYLATE 2.5 MG/1
TABLET ORAL
Qty: 30 TABLET | Refills: 1 | Status: SHIPPED | OUTPATIENT
Start: 2019-11-05 | End: 2019-12-31

## 2019-11-19 ENCOUNTER — OFFICE VISIT (OUTPATIENT)
Dept: INTERNAL MEDICINE | Facility: CLINIC | Age: 84
End: 2019-11-19

## 2019-11-19 VITALS
SYSTOLIC BLOOD PRESSURE: 120 MMHG | DIASTOLIC BLOOD PRESSURE: 70 MMHG | HEIGHT: 60 IN | WEIGHT: 124 LBS | HEART RATE: 68 BPM | BODY MASS INDEX: 24.35 KG/M2

## 2019-11-19 DIAGNOSIS — G25.0 BENIGN ESSENTIAL TREMOR: ICD-10-CM

## 2019-11-19 DIAGNOSIS — F32.89 OTHER DEPRESSION: ICD-10-CM

## 2019-11-19 DIAGNOSIS — E55.9 VITAMIN D DEFICIENCY: ICD-10-CM

## 2019-11-19 DIAGNOSIS — K59.09 OTHER CONSTIPATION: ICD-10-CM

## 2019-11-19 DIAGNOSIS — K21.9 GASTROESOPHAGEAL REFLUX DISEASE WITHOUT ESOPHAGITIS: ICD-10-CM

## 2019-11-19 DIAGNOSIS — G47.09 OTHER INSOMNIA: ICD-10-CM

## 2019-11-19 DIAGNOSIS — R73.09 ABNORMAL GLUCOSE: ICD-10-CM

## 2019-11-19 DIAGNOSIS — I10 ESSENTIAL HYPERTENSION: Primary | ICD-10-CM

## 2019-11-19 PROCEDURE — 83036 HEMOGLOBIN GLYCOSYLATED A1C: CPT | Performed by: INTERNAL MEDICINE

## 2019-11-19 PROCEDURE — 82607 VITAMIN B-12: CPT | Performed by: INTERNAL MEDICINE

## 2019-11-19 PROCEDURE — 80053 COMPREHEN METABOLIC PANEL: CPT | Performed by: INTERNAL MEDICINE

## 2019-11-19 PROCEDURE — 84443 ASSAY THYROID STIM HORMONE: CPT | Performed by: INTERNAL MEDICINE

## 2019-11-19 PROCEDURE — 82306 VITAMIN D 25 HYDROXY: CPT | Performed by: INTERNAL MEDICINE

## 2019-11-19 PROCEDURE — 99214 OFFICE O/P EST MOD 30 MIN: CPT | Performed by: INTERNAL MEDICINE

## 2019-11-19 PROCEDURE — 85027 COMPLETE CBC AUTOMATED: CPT | Performed by: INTERNAL MEDICINE

## 2019-11-19 NOTE — PROGRESS NOTES
Patient is a 88 y.o. female who is here for a follow up of hypertension.  Chief Complaint   Patient presents with   • Hypertension         HPI:    Here for mgmt of HTN.  Onset years.  Recently fell last week in bathroom.  Had facial trauma.  No HAs or visual complaints.  No dizziness or lightheadedness.  Does not take BP reading.    GERD is controlled.  Sleeping ok.  Depression is under control.     History:     Patient Active Problem List   Diagnosis   • Gastroesophageal reflux disease without esophagitis   • Depression   • Essential hypertension   • Other insomnia   • Benign essential tremor   • Other constipation   • Vitamin D deficiency       Past Medical History:   Diagnosis Date   • Cystocele, midline    • Hemorrhoids        Past Surgical History:   Procedure Laterality Date   • BREAST BIOPSY      cyst removed   • CATARACT EXTRACTION Bilateral 2005   • THYROID SURGERY      nodule removal, Benign       Current Outpatient Medications on File Prior to Visit   Medication Sig   • amLODIPine (NORVASC) 2.5 MG tablet TAKE ONE TABLET BY MOUTH DAILY   • ARIPiprazole (ABILIFY) 2 MG tablet    • citalopram (CeleXA) 40 MG tablet    • clonazePAM (KlonoPIN) 0.5 MG tablet Take 0.5 mg by mouth At Night As Needed for Anxiety.   • dorzolamide-timolol (COSOPT) 22.3-6.8 MG/ML ophthalmic solution    • LUMIGAN 0.01 % ophthalmic drops    • primidone (MYSOLINE) 50 MG tablet 1 in am, 1 at noon, and 2 at bedtime   • traZODone (DESYREL) 50 MG tablet Take 1 tablet by mouth Every Night.   • [DISCONTINUED] amLODIPine (NORVASC) 2.5 MG tablet TAKE ONE TABLET BY MOUTH DAILY     No current facility-administered medications on file prior to visit.        Family History   Problem Relation Age of Onset   • Diabetes Mother    • Heart attack Father    • Diabetes Sister    • Breast cancer Daughter        Social History     Socioeconomic History   • Marital status:      Spouse name: Not on file   • Number of children: Not on file   • Years of  "education: Not on file   • Highest education level: Not on file   Tobacco Use   • Smoking status: Former Smoker   • Smokeless tobacco: Never Used         Review of Systems   Constitutional: Negative for chills, fatigue, fever and unexpected weight change.   HENT: Negative for congestion, ear pain, hearing loss, rhinorrhea, sinus pressure, sore throat and trouble swallowing.    Eyes: Negative for discharge and itching.   Respiratory: Negative for cough, chest tightness and shortness of breath.    Cardiovascular: Positive for leg swelling. Negative for chest pain and palpitations.   Gastrointestinal: Negative for abdominal pain, blood in stool, diarrhea and vomiting.        Colonoscopy 2011 normal   Endocrine: Positive for cold intolerance. Negative for polydipsia and polyuria.   Genitourinary: Positive for frequency. Negative for difficulty urinating, dysuria, enuresis, hematuria and urgency.   Musculoskeletal: Positive for arthralgias. Negative for back pain, gait problem and joint swelling.   Skin: Negative for rash and wound.   Allergic/Immunologic: Negative for immunocompromised state.   Neurological: Positive for tremors. Negative for dizziness, syncope, weakness, light-headedness and headaches.   Hematological: Does not bruise/bleed easily.   Psychiatric/Behavioral: Positive for dysphoric mood. Negative for behavioral problems and sleep disturbance. The patient is nervous/anxious.        /70   Pulse 68   Ht 152.4 cm (60\")   Wt 56.2 kg (124 lb)   BMI 24.22 kg/m²       Physical Exam   Constitutional: She is oriented to person, place, and time. She appears well-developed and well-nourished.   HENT:   Head: Normocephalic and atraumatic.   Right Ear: External ear normal.   Left Ear: External ear normal.   Mouth/Throat: Oropharynx is clear and moist.   Eyes: Conjunctivae and EOM are normal.   Neck: Normal range of motion. Neck supple.   Cardiovascular: Normal rate, regular rhythm and normal heart sounds. "   Pulmonary/Chest: Effort normal and breath sounds normal.   Abdominal: Soft. Bowel sounds are normal.   Musculoskeletal: Normal range of motion. She exhibits edema and deformity (kyphosis).   Lymphadenopathy:     She has no cervical adenopathy.   Neurological: She is alert and oriented to person, place, and time.   Skin: Skin is warm and dry.   Psychiatric: She has a normal mood and affect. Her behavior is normal. Thought content normal.       Procedure:      Discussion/Summary:    HTN/edema-advised to monitor, stable on CCB  Constipation-advised to be compliant with citrucel  GERD-cont zantac, stable  Tremor-stable on primidone  Depression-still an issue on celexa and abilify, labs today  Insomnia-stable on trazodone  Vit d def-cont replacement, recheck      Labs noted and dw patient    Current Outpatient Medications:   •  amLODIPine (NORVASC) 2.5 MG tablet, TAKE ONE TABLET BY MOUTH DAILY, Disp: 30 tablet, Rfl: 1  •  ARIPiprazole (ABILIFY) 2 MG tablet, , Disp: , Rfl:   •  citalopram (CeleXA) 40 MG tablet, , Disp: , Rfl:   •  clonazePAM (KlonoPIN) 0.5 MG tablet, Take 0.5 mg by mouth At Night As Needed for Anxiety., Disp: , Rfl:   •  dorzolamide-timolol (COSOPT) 22.3-6.8 MG/ML ophthalmic solution, , Disp: , Rfl:   •  LUMIGAN 0.01 % ophthalmic drops, , Disp: , Rfl:   •  primidone (MYSOLINE) 50 MG tablet, 1 in am, 1 at noon, and 2 at bedtime, Disp: 120 tablet, Rfl: 5  •  traZODone (DESYREL) 50 MG tablet, Take 1 tablet by mouth Every Night., Disp: 30 tablet, Rfl: 2        Chula was seen today for hypertension.    Diagnoses and all orders for this visit:    Essential hypertension  -     CBC (No Diff)    Other constipation    Gastroesophageal reflux disease without esophagitis    Benign essential tremor    Other insomnia    Other depression  -     Vitamin B12  -     Comprehensive Metabolic Panel  -     TSH    Vitamin D deficiency  -     Vitamin D 25 Hydroxy

## 2019-11-20 LAB
25(OH)D3 SERPL-MCNC: 58.6 NG/ML (ref 30–100)
ALBUMIN SERPL-MCNC: 3.9 G/DL (ref 3.5–5.2)
ALBUMIN/GLOB SERPL: 1.1 G/DL
ALP SERPL-CCNC: 77 U/L (ref 39–117)
ALT SERPL W P-5'-P-CCNC: 19 U/L (ref 1–33)
ANION GAP SERPL CALCULATED.3IONS-SCNC: 13.2 MMOL/L (ref 5–15)
AST SERPL-CCNC: 16 U/L (ref 1–32)
BILIRUB SERPL-MCNC: 0.2 MG/DL (ref 0.2–1.2)
BUN BLD-MCNC: 14 MG/DL (ref 8–23)
BUN/CREAT SERPL: 22.6 (ref 7–25)
CALCIUM SPEC-SCNC: 9.1 MG/DL (ref 8.6–10.5)
CHLORIDE SERPL-SCNC: 97 MMOL/L (ref 98–107)
CO2 SERPL-SCNC: 25.8 MMOL/L (ref 22–29)
CREAT BLD-MCNC: 0.62 MG/DL (ref 0.57–1)
DEPRECATED RDW RBC AUTO: 41.3 FL (ref 37–54)
ERYTHROCYTE [DISTWIDTH] IN BLOOD BY AUTOMATED COUNT: 12.5 % (ref 12.3–15.4)
GFR SERPL CREATININE-BSD FRML MDRD: 91 ML/MIN/1.73
GLOBULIN UR ELPH-MCNC: 3.5 GM/DL
GLUCOSE BLD-MCNC: 116 MG/DL (ref 65–99)
HBA1C MFR BLD: 6.1 % (ref 4.8–5.6)
HCT VFR BLD AUTO: 38.5 % (ref 34–46.6)
HGB BLD-MCNC: 13 G/DL (ref 12–15.9)
MCH RBC QN AUTO: 30.4 PG (ref 26.6–33)
MCHC RBC AUTO-ENTMCNC: 33.8 G/DL (ref 31.5–35.7)
MCV RBC AUTO: 90 FL (ref 79–97)
PLATELET # BLD AUTO: 354 10*3/MM3 (ref 140–450)
PMV BLD AUTO: 11.7 FL (ref 6–12)
POTASSIUM BLD-SCNC: 4 MMOL/L (ref 3.5–5.2)
PROT SERPL-MCNC: 7.4 G/DL (ref 6–8.5)
RBC # BLD AUTO: 4.28 10*6/MM3 (ref 3.77–5.28)
SODIUM BLD-SCNC: 136 MMOL/L (ref 136–145)
TSH SERPL DL<=0.05 MIU/L-ACNC: 1.2 UIU/ML (ref 0.27–4.2)
VIT B12 BLD-MCNC: 715 PG/ML (ref 211–946)
WBC NRBC COR # BLD: 7.95 10*3/MM3 (ref 3.4–10.8)

## 2019-12-26 ENCOUNTER — TELEPHONE (OUTPATIENT)
Dept: INTERNAL MEDICINE | Facility: CLINIC | Age: 84
End: 2019-12-26

## 2019-12-26 NOTE — TELEPHONE ENCOUNTER
PT CALLED IN REQUESTING A REFILL ON HER  traZODone (DESYREL) 50 MG tablet    PT CB NUMBER: [phone]  [facility]: [facility]  FAX# [phone]

## 2019-12-27 ENCOUNTER — TELEPHONE (OUTPATIENT)
Dept: INTERNAL MEDICINE | Facility: CLINIC | Age: 84
End: 2019-12-27

## 2019-12-27 RX ORDER — TRAZODONE HYDROCHLORIDE 50 MG/1
50 TABLET ORAL NIGHTLY
Qty: 90 TABLET | Refills: 3 | Status: SHIPPED | OUTPATIENT
Start: 2019-12-27

## 2019-12-27 RX ORDER — TRAZODONE HYDROCHLORIDE 50 MG/1
TABLET ORAL
Qty: 30 TABLET | Refills: 1 | Status: SHIPPED | OUTPATIENT
Start: 2019-12-27 | End: 2019-12-27

## 2019-12-27 RX ORDER — TRAZODONE HYDROCHLORIDE 50 MG/1
50 TABLET ORAL NIGHTLY
Qty: 30 TABLET | Refills: 2 | Status: SHIPPED | OUTPATIENT
Start: 2019-12-27 | End: 2019-12-27

## 2019-12-27 NOTE — TELEPHONE ENCOUNTER
Pt is calling because she needs a refill on trazodone and she is only has 4 pills left. Pt said that she left a message yesterday about it. I did tell pt that Dr. Lucero was out of the office.

## 2019-12-31 RX ORDER — AMLODIPINE BESYLATE 2.5 MG/1
TABLET ORAL
Qty: 30 TABLET | Refills: 1 | Status: SHIPPED | OUTPATIENT
Start: 2019-12-31 | End: 2020-03-03

## 2020-02-24 ENCOUNTER — OFFICE VISIT (OUTPATIENT)
Dept: INTERNAL MEDICINE | Facility: CLINIC | Age: 85
End: 2020-02-24

## 2020-02-24 VITALS
HEIGHT: 60 IN | DIASTOLIC BLOOD PRESSURE: 70 MMHG | SYSTOLIC BLOOD PRESSURE: 130 MMHG | BODY MASS INDEX: 23.16 KG/M2 | WEIGHT: 118 LBS | HEART RATE: 76 BPM

## 2020-02-24 DIAGNOSIS — E55.9 VITAMIN D DEFICIENCY: ICD-10-CM

## 2020-02-24 DIAGNOSIS — G47.09 OTHER INSOMNIA: ICD-10-CM

## 2020-02-24 DIAGNOSIS — K21.9 GASTROESOPHAGEAL REFLUX DISEASE WITHOUT ESOPHAGITIS: ICD-10-CM

## 2020-02-24 DIAGNOSIS — K59.09 OTHER CONSTIPATION: ICD-10-CM

## 2020-02-24 DIAGNOSIS — I10 ESSENTIAL HYPERTENSION: Primary | ICD-10-CM

## 2020-02-24 DIAGNOSIS — F32.89 OTHER DEPRESSION: ICD-10-CM

## 2020-02-24 DIAGNOSIS — G25.0 BENIGN ESSENTIAL TREMOR: ICD-10-CM

## 2020-02-24 PROCEDURE — 99214 OFFICE O/P EST MOD 30 MIN: CPT | Performed by: INTERNAL MEDICINE

## 2020-02-24 RX ORDER — ARIPIPRAZOLE 5 MG/1
5 TABLET ORAL DAILY
Qty: 90 TABLET | Refills: 3 | Status: SHIPPED | OUTPATIENT
Start: 2020-02-24 | End: 2020-04-14 | Stop reason: SDUPTHER

## 2020-02-24 NOTE — PROGRESS NOTES
Patient is a 88 y.o. female who is here for a follow up of hypertension.  Chief Complaint   Patient presents with   • Hypertension         HPI:    Here for mgmt of HTN and GERD.  Dealing with depression.  Feels blah.  Energy level is low.  Low motivation.  No dizziness or lightheadedness.  BP has been good.  No HAs.  Appetite is not the best.  No abdominal pains.  No fever or chills or night sweats.  Tremor is controlled.      History:     Patient Active Problem List   Diagnosis   • Gastroesophageal reflux disease without esophagitis   • Depression   • Essential hypertension   • Other insomnia   • Benign essential tremor   • Other constipation   • Vitamin D deficiency       Past Medical History:   Diagnosis Date   • Cystocele, midline    • Hemorrhoids        Past Surgical History:   Procedure Laterality Date   • BREAST BIOPSY      cyst removed   • CATARACT EXTRACTION Bilateral 2005   • THYROID SURGERY      nodule removal, Benign       Current Outpatient Medications on File Prior to Visit   Medication Sig   • amLODIPine (NORVASC) 2.5 MG tablet TAKE ONE TABLET BY MOUTH DAILY   • citalopram (CeleXA) 40 MG tablet    • clonazePAM (KlonoPIN) 0.5 MG tablet Take 0.5 mg by mouth At Night As Needed for Anxiety.   • dorzolamide-timolol (COSOPT) 22.3-6.8 MG/ML ophthalmic solution    • LUMIGAN 0.01 % ophthalmic drops    • primidone (MYSOLINE) 50 MG tablet 1 in am, 1 at noon, and 2 at bedtime   • traZODone (DESYREL) 50 MG tablet Take 1 tablet by mouth Every Night.   • [DISCONTINUED] ARIPiprazole (ABILIFY) 2 MG tablet      No current facility-administered medications on file prior to visit.        Family History   Problem Relation Age of Onset   • Diabetes Mother    • Heart attack Father    • Diabetes Sister    • Breast cancer Daughter        Social History     Socioeconomic History   • Marital status:      Spouse name: Not on file   • Number of children: Not on file   • Years of education: Not on file   • Highest education  "level: Not on file   Tobacco Use   • Smoking status: Former Smoker   • Smokeless tobacco: Never Used         Review of Systems   Constitutional: Positive for appetite change, fatigue and unexpected weight change. Negative for chills and fever.   HENT: Negative for congestion, ear pain, hearing loss, rhinorrhea, sinus pressure, sore throat and trouble swallowing.    Eyes: Negative for discharge and itching.   Respiratory: Negative for cough, chest tightness and shortness of breath.    Cardiovascular: Positive for leg swelling. Negative for chest pain and palpitations.   Gastrointestinal: Negative for abdominal pain, blood in stool, diarrhea and vomiting.        Colonoscopy 2011 normal   Endocrine: Positive for cold intolerance. Negative for polydipsia and polyuria.   Genitourinary: Negative for difficulty urinating, dysuria, enuresis, hematuria and urgency.   Musculoskeletal: Positive for arthralgias. Negative for back pain, gait problem and joint swelling.   Skin: Negative for rash and wound.   Allergic/Immunologic: Negative for immunocompromised state.   Neurological: Positive for tremors (better) and weakness. Negative for dizziness, syncope, light-headedness and headaches.   Hematological: Does not bruise/bleed easily.   Psychiatric/Behavioral: Positive for dysphoric mood. Negative for behavioral problems and sleep disturbance. The patient is nervous/anxious.        /70   Pulse 76   Ht 152.4 cm (60\")   Wt 53.5 kg (118 lb)   BMI 23.05 kg/m²       Physical Exam   Constitutional: She is oriented to person, place, and time. She appears well-developed and well-nourished.   HENT:   Head: Normocephalic and atraumatic.   Right Ear: External ear normal.   Left Ear: External ear normal.   Mouth/Throat: Oropharynx is clear and moist.   Eyes: Conjunctivae and EOM are normal.   Neck: Normal range of motion. Neck supple.   Cardiovascular: Normal rate, regular rhythm and normal heart sounds.   Pulmonary/Chest: Effort " normal and breath sounds normal.   Abdominal: Soft. Bowel sounds are normal.   Musculoskeletal: Normal range of motion. She exhibits edema and deformity (kyphosis).   Lymphadenopathy:     She has no cervical adenopathy.   Neurological: She is alert and oriented to person, place, and time.   Slight tremor in hands   Skin: Skin is warm and dry.   Psychiatric: She has a normal mood and affect. Her behavior is normal. Thought content normal.       Procedure:      Discussion/Summary:    HTN/edema-advised to monitor, controlled on CCB  Constipation-advised to be compliant with citrucel, stable  GERD-cont zantac, stable  Tremor-controlled on primidone  Depression-still an issue on celexa and abilify, will increase abilify  Insomnia-controlled on trazodone  Vit d def-cont replacement, recheck 11/19 at goal     11/19 Labs noted and dw patient    Current Outpatient Medications:   •  amLODIPine (NORVASC) 2.5 MG tablet, TAKE ONE TABLET BY MOUTH DAILY, Disp: 30 tablet, Rfl: 1  •  ARIPiprazole (ABILIFY) 5 MG tablet, Take 1 tablet by mouth Daily., Disp: 90 tablet, Rfl: 3  •  citalopram (CeleXA) 40 MG tablet, , Disp: , Rfl:   •  clonazePAM (KlonoPIN) 0.5 MG tablet, Take 0.5 mg by mouth At Night As Needed for Anxiety., Disp: , Rfl:   •  dorzolamide-timolol (COSOPT) 22.3-6.8 MG/ML ophthalmic solution, , Disp: , Rfl:   •  LUMIGAN 0.01 % ophthalmic drops, , Disp: , Rfl:   •  primidone (MYSOLINE) 50 MG tablet, 1 in am, 1 at noon, and 2 at bedtime, Disp: 120 tablet, Rfl: 5  •  traZODone (DESYREL) 50 MG tablet, Take 1 tablet by mouth Every Night., Disp: 90 tablet, Rfl: 3        Chula was seen today for hypertension.    Diagnoses and all orders for this visit:    Essential hypertension    Vitamin D deficiency    Other constipation    Gastroesophageal reflux disease without esophagitis    Benign essential tremor    Other insomnia    Other depression  -     ARIPiprazole (ABILIFY) 5 MG tablet; Take 1 tablet by mouth Daily.

## 2020-02-25 ENCOUNTER — TELEPHONE (OUTPATIENT)
Dept: INTERNAL MEDICINE | Facility: CLINIC | Age: 85
End: 2020-02-25

## 2020-02-25 RX ORDER — CLONAZEPAM 0.5 MG/1
0.5 TABLET ORAL NIGHTLY PRN
Qty: 30 TABLET | Refills: 2 | Status: SHIPPED | OUTPATIENT
Start: 2020-02-25 | End: 2020-05-28 | Stop reason: SDUPTHER

## 2020-02-25 NOTE — TELEPHONE ENCOUNTER
Pt called to get a refill of clonazePAM (KlonoPIN) 0.5 MG tablet    Sent to [facility] on [address]   PH: [phone]  Fax; [phone]

## 2020-02-26 ENCOUNTER — TELEPHONE (OUTPATIENT)
Dept: INTERNAL MEDICINE | Facility: CLINIC | Age: 85
End: 2020-02-26

## 2020-03-03 RX ORDER — AMLODIPINE BESYLATE 2.5 MG/1
TABLET ORAL
Qty: 30 TABLET | Refills: 1 | Status: SHIPPED | OUTPATIENT
Start: 2020-03-03 | End: 2020-03-03 | Stop reason: SDUPTHER

## 2020-03-04 RX ORDER — AMLODIPINE BESYLATE 2.5 MG/1
2.5 TABLET ORAL DAILY
Qty: 30 TABLET | Refills: 1 | Status: SHIPPED | OUTPATIENT
Start: 2020-03-04 | End: 2020-05-04 | Stop reason: SDUPTHER

## 2020-03-30 ENCOUNTER — TELEPHONE (OUTPATIENT)
Dept: INTERNAL MEDICINE | Facility: CLINIC | Age: 85
End: 2020-03-30

## 2020-03-30 DIAGNOSIS — G25.0 BENIGN ESSENTIAL TREMOR: ICD-10-CM

## 2020-03-30 RX ORDER — PRIMIDONE 50 MG/1
TABLET ORAL
Qty: 120 TABLET | Refills: 5 | Status: SHIPPED | OUTPATIENT
Start: 2020-03-30

## 2020-03-30 NOTE — TELEPHONE ENCOUNTER
PT CALLED STATES SHE IS NEEDING A REFILL ON THE FOLLOWING MEDICATION(S):     primidone (MYSOLINE) 50 MG tablet  PT ONLY HAS 2 TABLETS LEFT      CONFIRMED PHARMACY:  [facility] [address] [place] [address] [address] [facility] [phone] [facility] [phone]       CONTACT: [phone]

## 2020-04-08 RX ORDER — CITALOPRAM 40 MG/1
40 TABLET ORAL DAILY
Qty: 90 TABLET | Refills: 2 | Status: SHIPPED | OUTPATIENT
Start: 2020-04-08

## 2020-04-08 NOTE — TELEPHONE ENCOUNTER
PATIENT CALLED TO REQUEST citalopram (CeleXA) 40 MG tablet    PHARMACY VERIFIED:    [facility] [place], [place] - [address] [place] [address] [address] [place] - [phone] [facility] [phone] FX     ANY QUESTIONS CALL PATIENT AT:  [phone]

## 2020-04-14 DIAGNOSIS — F32.89 OTHER DEPRESSION: ICD-10-CM

## 2020-04-14 RX ORDER — ARIPIPRAZOLE 5 MG/1
5 TABLET ORAL DAILY
Qty: 90 TABLET | Refills: 3 | Status: SHIPPED | OUTPATIENT
Start: 2020-04-14 | End: 2020-11-03

## 2020-04-14 NOTE — TELEPHONE ENCOUNTER
PT CALLED REQUESTING A REFILL FOR RX  ARIPiprazole (ABILIFY) 5 MG tablet.    PLEASE ADVISE.  CALL BACK:[phone]       [facility] [address]

## 2020-05-04 RX ORDER — AMLODIPINE BESYLATE 2.5 MG/1
2.5 TABLET ORAL DAILY
Qty: 30 TABLET | Refills: 1 | Status: SHIPPED | OUTPATIENT
Start: 2020-05-04 | End: 2020-07-02

## 2020-05-04 NOTE — TELEPHONE ENCOUNTER
Pt is requesting a 90 day supply on medication  amLODIPine (NORVASC) 2.5 MG tablet         Pharmacy confirmed   Please advise

## 2020-05-28 RX ORDER — CLONAZEPAM 0.5 MG/1
0.5 TABLET ORAL NIGHTLY PRN
Qty: 30 TABLET | Refills: 2 | Status: SHIPPED | OUTPATIENT
Start: 2020-05-28

## 2020-06-09 ENCOUNTER — TELEPHONE (OUTPATIENT)
Dept: INTERNAL MEDICINE | Facility: CLINIC | Age: 85
End: 2020-06-09

## 2020-06-09 NOTE — TELEPHONE ENCOUNTER
PT'S DAUGHTER ZAIDA, CALLED STATING THAT [facility] IN [place] STATED THAT THEY WERE GOING TO STOP THE PT'S MEDICARE WHILE SHE IS IN THE FACILITY.  ZAIDA WOULD LIKE TO HAVE A NOTE SENT OVER ON HER BEHAVE FROM DR RUCKER.     MEDICARE  CASE ID#[id number]   NEEDS TO GO ON THE LETTER  FAX#[phone]    ZAIDA'S CONTACT [phone]     PLEASE ADVISE

## 2020-06-30 ENCOUNTER — TELEPHONE (OUTPATIENT)
Dept: INTERNAL MEDICINE | Facility: CLINIC | Age: 85
End: 2020-06-30

## 2020-06-30 NOTE — TELEPHONE ENCOUNTER
SHARRON FROM [facility] CALLED TO REPORT THAT SHE DID NURSING EVAL ON HER.   NEEDS AN ORDER FOR PRN UA, ALSO WILL NEED A FEW PRN VISITS.   PATIENT AT THIS TIME DENIES HAVING ANY SYMPTOMS OF UTI.     WILL SEND ORDERS

## 2020-07-02 ENCOUNTER — TELEPHONE (OUTPATIENT)
Dept: INTERNAL MEDICINE | Facility: CLINIC | Age: 85
End: 2020-07-02

## 2020-07-02 RX ORDER — AMLODIPINE BESYLATE 2.5 MG/1
TABLET ORAL
Qty: 30 TABLET | Refills: 5 | Status: SHIPPED | OUTPATIENT
Start: 2020-07-02

## 2020-07-02 NOTE — TELEPHONE ENCOUNTER
Latosha with [facility] requesting verbal orders for home health physical therapy 1-2 times per week for 8 weeks.    [phone]

## 2020-07-13 ENCOUNTER — TELEPHONE (OUTPATIENT)
Dept: INTERNAL MEDICINE | Facility: CLINIC | Age: 85
End: 2020-07-13

## 2020-07-13 NOTE — TELEPHONE ENCOUNTER
Kamini from [facility] states that she needs an order for a UA.  She can be reached at [phone]

## 2020-07-14 ENCOUNTER — TELEPHONE (OUTPATIENT)
Dept: INTERNAL MEDICINE | Facility: CLINIC | Age: 85
End: 2020-07-14

## 2020-07-14 ENCOUNTER — LAB REQUISITION (OUTPATIENT)
Dept: LAB | Facility: HOSPITAL | Age: 85
End: 2020-07-14

## 2020-07-14 DIAGNOSIS — Z00.00 ROUTINE GENERAL MEDICAL EXAMINATION AT A HEALTH CARE FACILITY: ICD-10-CM

## 2020-07-14 LAB
BACTERIA UR QL AUTO: ABNORMAL /HPF
BILIRUB UR QL STRIP: NEGATIVE
CLARITY UR: ABNORMAL
COLOR UR: YELLOW
GLUCOSE UR STRIP-MCNC: NEGATIVE MG/DL
HGB UR QL STRIP.AUTO: NEGATIVE
HYALINE CASTS UR QL AUTO: ABNORMAL /LPF
KETONES UR QL STRIP: ABNORMAL
LEUKOCYTE ESTERASE UR QL STRIP.AUTO: ABNORMAL
NITRITE UR QL STRIP: NEGATIVE
PH UR STRIP.AUTO: 7 [PH] (ref 5–8)
PROT UR QL STRIP: NEGATIVE
RBC # UR: ABNORMAL /HPF
REF LAB TEST METHOD: ABNORMAL
SP GR UR STRIP: 1.01 (ref 1–1.03)
SQUAMOUS #/AREA URNS HPF: ABNORMAL /HPF
UROBILINOGEN UR QL STRIP: ABNORMAL
WBC UR QL AUTO: ABNORMAL /HPF

## 2020-07-14 PROCEDURE — 81001 URINALYSIS AUTO W/SCOPE: CPT | Performed by: INTERNAL MEDICINE

## 2020-07-14 PROCEDURE — 87086 URINE CULTURE/COLONY COUNT: CPT | Performed by: INTERNAL MEDICINE

## 2020-07-14 NOTE — TELEPHONE ENCOUNTER
ILIR WITH [facility] SAID PATIENT HAS HAD A COUPLE OF FALLS AND SIGNIFICANT DECLINE SINCE SHE SAW LAST WEEK ON 7/10     SHE CAN'T STAND UP RIGHT AND LEANING BACKWARDS, TREMORS, AND CAN'T GET UP BY HERSELF     DO YOU WANT TO SEE HER OR HAVE THE HOME HEALTH NURSE DO SOMETHING    [facility] PH: [phone]

## 2020-07-14 NOTE — TELEPHONE ENCOUNTER
ZAIDA, DAUGHTER, CALLING IN TO REQUEST AN ANTIBIOTIC CALLED IN TO [facility] FOR THE POSSIBLE UTI HER MOTHER IS EXPERIENCING.    SHE IS REQUESTING THIS TO BE DONE BY 3:30PM TODAY TO GET THE ANTIBIOTICS IN HER MOTHERS SYSTEM.    [facility] IS DOING A URINE SAMPLE AND BLOODWORK TODAY.    ZAIDA IS ARRANGING FOR SOMEONE TO  THE PRESCRIPTION TODAY FROM [facility].    [facility] [address] [facility] [phone] [facility] [phone]     PATIENT CONTACT NUMBER [phone]    Called and advised for  to draw UA , c and s

## 2020-07-14 NOTE — TELEPHONE ENCOUNTER
PTS DAUGHTER CALLED AND WANTED TO KNOW IF DR RUCKER CAN CALL PT SOONER THAN Thursday AND SAID THE PATIENT IS GETTING WEAKER AND IS BEING TESTED TO TODAY FOR UTI; DAUGHTER IS REQUESTING AN ADDITIONAL ROUND OF ANTIBOTICS; PLEASE ADVISE    MITCHEL: [phone]    Called patient and all questions answered.

## 2020-07-15 ENCOUNTER — TELEPHONE (OUTPATIENT)
Dept: INTERNAL MEDICINE | Facility: CLINIC | Age: 85
End: 2020-07-15

## 2020-07-15 NOTE — TELEPHONE ENCOUNTER
PLEASE HAVE DR. BONILLA TO CALL HER. SHE WANTS TO TALK TO HIM PLEASE CALL THE DAUGHTER ORION AT -[phone]. SHE IS VERY WEAK, AND CONFUSED. HER DAUGHTER WAS VERY UPSET THAT DR. BONILLA HAS NOT  DONE ANYTHING. SHE IS VERY WEAK AND CONFUSED. THEY WANT TO KNOW WHY THIS IS HAPPENING. PLEASE HELP.

## 2020-07-15 NOTE — TELEPHONE ENCOUNTER
ZAIDA, DAUGHTER, CALLING IN FOR RESULTS OF URINALYSIS.    PLEASE CALL THE PATIENT [phone]    IF THE PATIENT IS UNAVAILABLE YOU HAVE PERMISSION TO LEAVE THE INFORMATION WITH HER CAREGIVERS.

## 2020-07-16 ENCOUNTER — OFFICE VISIT (OUTPATIENT)
Dept: INTERNAL MEDICINE | Facility: CLINIC | Age: 85
End: 2020-07-16

## 2020-07-16 ENCOUNTER — TELEPHONE (OUTPATIENT)
Dept: INTERNAL MEDICINE | Facility: CLINIC | Age: 85
End: 2020-07-16

## 2020-07-16 VITALS — BODY MASS INDEX: 23.05 KG/M2 | HEIGHT: 60 IN

## 2020-07-16 DIAGNOSIS — I10 ESSENTIAL HYPERTENSION: Primary | ICD-10-CM

## 2020-07-16 DIAGNOSIS — K59.09 OTHER CONSTIPATION: ICD-10-CM

## 2020-07-16 DIAGNOSIS — F32.89 OTHER DEPRESSION: ICD-10-CM

## 2020-07-16 DIAGNOSIS — E55.9 VITAMIN D DEFICIENCY: ICD-10-CM

## 2020-07-16 DIAGNOSIS — G47.09 OTHER INSOMNIA: ICD-10-CM

## 2020-07-16 DIAGNOSIS — R35.0 URINARY FREQUENCY: ICD-10-CM

## 2020-07-16 DIAGNOSIS — K21.9 GASTROESOPHAGEAL REFLUX DISEASE WITHOUT ESOPHAGITIS: ICD-10-CM

## 2020-07-16 DIAGNOSIS — G25.0 BENIGN ESSENTIAL TREMOR: ICD-10-CM

## 2020-07-16 LAB — BACTERIA SPEC AEROBE CULT: NORMAL

## 2020-07-16 PROCEDURE — 99214 OFFICE O/P EST MOD 30 MIN: CPT | Performed by: INTERNAL MEDICINE

## 2020-07-16 NOTE — TELEPHONE ENCOUNTER
Deana, daughter, is in another state and is wanting to know if she can be conference in on the televisit today at 2:30 with Pt.  Deana is on file with [facility].  She said if she is unable to in on the televisit, can she get a call back after the visit to update on Pt's health from that visit.    Deana  [phone]

## 2020-07-16 NOTE — PROGRESS NOTES
Patient is a 89 y.o. female who is here for a follow up of uti.  Chief Complaint   Patient presents with   • Urinary Tract Infection     You have chosen to receive care through a telephone visit. Do you consent to use a telephone visit for your medical care today? Yes      HPI:    Patient called for evaluation of weakness.  Onset weeks.  Having to use the bathroom frequently.  Has physical therapy once or twice a week.  Has caretakers 24 hours a day.  Appetite is quite poor.  Denies depression.  Not sleeping bc of going to bathroom often.      History:     Patient Active Problem List   Diagnosis   • Gastroesophageal reflux disease without esophagitis   • Depression   • Essential hypertension   • Other insomnia   • Benign essential tremor   • Other constipation   • Vitamin D deficiency   • Urinary frequency       Past Medical History:   Diagnosis Date   • Cystocele, midline    • Hemorrhoids        Past Surgical History:   Procedure Laterality Date   • BREAST BIOPSY      cyst removed   • CATARACT EXTRACTION Bilateral 2005   • THYROID SURGERY      nodule removal, Benign       Current Outpatient Medications on File Prior to Visit   Medication Sig   • amLODIPine (NORVASC) 2.5 MG tablet TAKE ONE TABLET BY MOUTH DAILY   • ARIPiprazole (ABILIFY) 5 MG tablet Take 1 tablet by mouth Daily.   • citalopram (CeleXA) 40 MG tablet Take 1 tablet by mouth Daily.   • clonazePAM (KlonoPIN) 0.5 MG tablet Take 1 tablet by mouth At Night As Needed for Anxiety.   • dorzolamide-timolol (COSOPT) 22.3-6.8 MG/ML ophthalmic solution    • LUMIGAN 0.01 % ophthalmic drops    • primidone (MYSOLINE) 50 MG tablet 1 in am, 1 at noon, and 2 at bedtime   • traZODone (DESYREL) 50 MG tablet Take 1 tablet by mouth Every Night.     No current facility-administered medications on file prior to visit.        Family History   Problem Relation Age of Onset   • Diabetes Mother    • Heart attack Father    • Diabetes Sister    • Breast cancer Daughter   "      Social History     Socioeconomic History   • Marital status:      Spouse name: Not on file   • Number of children: Not on file   • Years of education: Not on file   • Highest education level: Not on file   Tobacco Use   • Smoking status: Former Smoker   • Smokeless tobacco: Never Used         Review of Systems   Constitutional: Positive for appetite change, fatigue and unexpected weight change. Negative for chills and fever.   HENT: Negative for congestion, ear pain, hearing loss, rhinorrhea, sinus pressure, sore throat and trouble swallowing.    Eyes: Negative for discharge and itching.   Respiratory: Negative for cough, chest tightness and shortness of breath.    Cardiovascular: Negative for chest pain and palpitations.   Gastrointestinal: Negative for abdominal pain, blood in stool, diarrhea and vomiting.        Colonoscopy 2011 normal   Endocrine: Positive for cold intolerance. Negative for polydipsia and polyuria.   Genitourinary: Positive for frequency and urgency. Negative for difficulty urinating, dysuria, enuresis and hematuria.   Musculoskeletal: Positive for arthralgias. Negative for back pain, gait problem and joint swelling.   Skin: Negative for rash and wound.   Allergic/Immunologic: Negative for immunocompromised state.   Neurological: Positive for tremors (better) and weakness. Negative for dizziness, syncope, light-headedness and headaches.   Hematological: Does not bruise/bleed easily.   Psychiatric/Behavioral: Positive for dysphoric mood. Negative for behavioral problems and sleep disturbance. The patient is nervous/anxious.        Ht 152.4 cm (60\")   BMI 23.05 kg/m²       Physical Exam   Constitutional: She is oriented to person, place, and time. No distress.   Neurological: She is alert and oriented to person, place, and time.   Psychiatric: She has a normal mood and affect. Her behavior is normal. Judgment and thought content normal. "       Procedure:      Discussion/Summary:    HTN/edema-advised to monitor, BP readings on low end of normal and will dc CCB, advised goal of 130/80  Constipation-advised to be compliant with citrucel, controlled  GERD-cont otc meds, stable  Tremor-controlled on primidone  Depression-stable on celexa and abilify  Insomnia-controlled on trazodone  Vit d def-cont replacement, recheck 11/19 at goal  Other-will reduce Klonopine in 1/2, labs soon, encouraged PT     11/19 Labs noted and dw patient    Advance Care Planning   ACP discussion was held with the patient during this visit. Patient has an advance directive (not in EMR), copy requested.      I spent 30 minutes in total including but not limited to the 25 minutes spent in direct conversation with this patient.   Additional time due to 3 way conference with family members        Current Outpatient Medications:   •  amLODIPine (NORVASC) 2.5 MG tablet, TAKE ONE TABLET BY MOUTH DAILY, Disp: 30 tablet, Rfl: 5  •  ARIPiprazole (ABILIFY) 5 MG tablet, Take 1 tablet by mouth Daily., Disp: 90 tablet, Rfl: 3  •  citalopram (CeleXA) 40 MG tablet, Take 1 tablet by mouth Daily., Disp: 90 tablet, Rfl: 2  •  clonazePAM (KlonoPIN) 0.5 MG tablet, Take 1 tablet by mouth At Night As Needed for Anxiety., Disp: 30 tablet, Rfl: 2  •  dorzolamide-timolol (COSOPT) 22.3-6.8 MG/ML ophthalmic solution, , Disp: , Rfl:   •  LUMIGAN 0.01 % ophthalmic drops, , Disp: , Rfl:   •  primidone (MYSOLINE) 50 MG tablet, 1 in am, 1 at noon, and 2 at bedtime, Disp: 120 tablet, Rfl: 5  •  traZODone (DESYREL) 50 MG tablet, Take 1 tablet by mouth Every Night., Disp: 90 tablet, Rfl: 3  •  Mirabegron ER (Myrbetriq) 25 MG tablet sustained-release 24 hour 24 hr tablet, Take 1 tablet by mouth Daily., Disp: 30 tablet, Rfl: 5        Chula was seen today for urinary tract infection.    Diagnoses and all orders for this visit:    Essential hypertension    Vitamin D deficiency    Other constipation    Gastroesophageal  reflux disease without esophagitis    Benign essential tremor    Other insomnia    Other depression    Urinary frequency  -     Mirabegron ER (Myrbetriq) 25 MG tablet sustained-release 24 hour 24 hr tablet; Take 1 tablet by mouth Daily.

## 2020-07-21 ENCOUNTER — OUTSIDE FACILITY SERVICE (OUTPATIENT)
Dept: INTERNAL MEDICINE | Facility: CLINIC | Age: 85
End: 2020-07-21

## 2020-07-21 PROCEDURE — G0180 MD CERTIFICATION HHA PATIENT: HCPCS | Performed by: INTERNAL MEDICINE

## 2020-08-20 ENCOUNTER — TELEPHONE (OUTPATIENT)
Dept: INTERNAL MEDICINE | Facility: CLINIC | Age: 85
End: 2020-08-20

## 2020-08-20 NOTE — TELEPHONE ENCOUNTER
Patient's daughter, Deana, was wanting to see if [facility] has contacted Dr. Lucero regarding her life insurance.  She can be reached at [phone]

## 2020-08-21 NOTE — TELEPHONE ENCOUNTER
NOTIFIED PT DAUGHTER THE REQUEST WAS PROCESSED ON THE 18TH AND MAY TAKE TWO WEEKS TO ARRIVE AT [facility]

## 2020-08-23 ENCOUNTER — LAB REQUISITION (OUTPATIENT)
Dept: LAB | Facility: HOSPITAL | Age: 85
End: 2020-08-23

## 2020-08-23 DIAGNOSIS — Z00.00 ROUTINE GENERAL MEDICAL EXAMINATION AT A HEALTH CARE FACILITY: ICD-10-CM

## 2020-08-23 LAB
BILIRUB UR QL STRIP: NEGATIVE
CLARITY UR: CLEAR
COLOR UR: YELLOW
GLUCOSE UR STRIP-MCNC: NEGATIVE MG/DL
HGB UR QL STRIP.AUTO: NEGATIVE
KETONES UR QL STRIP: NEGATIVE
LEUKOCYTE ESTERASE UR QL STRIP.AUTO: ABNORMAL
NITRITE UR QL STRIP: NEGATIVE
PH UR STRIP.AUTO: 7 [PH] (ref 5–8)
PROT UR QL STRIP: ABNORMAL
SP GR UR STRIP: 1.01 (ref 1–1.03)
UROBILINOGEN UR QL STRIP: ABNORMAL

## 2020-08-23 PROCEDURE — 81003 URINALYSIS AUTO W/O SCOPE: CPT

## 2020-09-01 ENCOUNTER — OFFICE VISIT (OUTPATIENT)
Dept: NEUROLOGY | Facility: CLINIC | Age: 85
End: 2020-09-01

## 2020-09-01 ENCOUNTER — LAB (OUTPATIENT)
Dept: LAB | Facility: HOSPITAL | Age: 85
End: 2020-09-01

## 2020-09-01 VITALS
WEIGHT: 104 LBS | BODY MASS INDEX: 20.42 KG/M2 | DIASTOLIC BLOOD PRESSURE: 80 MMHG | HEIGHT: 60 IN | TEMPERATURE: 98.2 F | OXYGEN SATURATION: 98 % | HEART RATE: 98 BPM | SYSTOLIC BLOOD PRESSURE: 134 MMHG

## 2020-09-01 DIAGNOSIS — R25.1 TREMOR: ICD-10-CM

## 2020-09-01 DIAGNOSIS — R26.89 IMPAIRMENT OF BALANCE: Primary | ICD-10-CM

## 2020-09-01 LAB
FOLATE SERPL-MCNC: 12.1 NG/ML (ref 4.78–24.2)
TSH SERPL DL<=0.05 MIU/L-ACNC: 0.99 UIU/ML (ref 0.27–4.2)
VIT B12 BLD-MCNC: 616 PG/ML (ref 211–946)

## 2020-09-01 PROCEDURE — 36415 COLL VENOUS BLD VENIPUNCTURE: CPT | Performed by: PHYSICIAN ASSISTANT

## 2020-09-01 PROCEDURE — 82607 VITAMIN B-12: CPT | Performed by: PHYSICIAN ASSISTANT

## 2020-09-01 PROCEDURE — 99214 OFFICE O/P EST MOD 30 MIN: CPT | Performed by: PHYSICIAN ASSISTANT

## 2020-09-01 PROCEDURE — 84443 ASSAY THYROID STIM HORMONE: CPT | Performed by: PHYSICIAN ASSISTANT

## 2020-09-01 PROCEDURE — 82746 ASSAY OF FOLIC ACID SERUM: CPT | Performed by: PHYSICIAN ASSISTANT

## 2020-09-01 RX ORDER — CEFUROXIME AXETIL 250 MG/1
250 TABLET ORAL 2 TIMES DAILY
COMMUNITY
End: 2020-11-03

## 2020-09-01 RX ORDER — PHENAZOPYRIDINE HYDROCHLORIDE 200 MG/1
200 TABLET, FILM COATED ORAL 3 TIMES DAILY PRN
COMMUNITY
End: 2020-11-03

## 2020-09-01 RX ORDER — ACETAMINOPHEN 325 MG/1
TABLET ORAL EVERY 6 HOURS PRN
COMMUNITY

## 2020-09-01 NOTE — PROGRESS NOTES
"Subjective     Chief Complaint: tremor      History of Present Illness   Chula Chen is a 89 y.o. female who comes to clinic today for evaluation of tremor. She initially noted an intention tremor in her hands bilaterally several years ago, primarily noted while eating and writing. She is currently taking primidone 50mg TID, which has been beneficial. She has also noted a resting tremor in her hands bilaterally for at least several months. This has worsened over time. There is associated balance impairment and a shuffling gait. She may also note associated bradykinesia and potential gait ignition failure. There are no clear modifying factors. She is currently working with PT at [facility], which has been beneficial.     Additionally, she reports waking up with diffuse shaking every night. The shaking lasts for several minutes before resolving. Unfortunately, the history regarding this is a bit unclear. However, she denies any associated confusion.       I have reviewed and confirmed the past family, social and medical history as accurate on 9/1/2020.     Review of Systems   Constitutional: Negative.    HENT: Negative.    Eyes: Negative.    Respiratory: Negative.    Cardiovascular: Negative.    Gastrointestinal: Negative.    Endocrine: Negative.    Genitourinary: Negative.    Musculoskeletal: Negative.    Skin: Negative.    Allergic/Immunologic: Negative.    Neurological: Positive for tremors.   Hematological: Negative.    Psychiatric/Behavioral: Negative.        Objective     /80   Pulse 98   Temp 98.2 °F (36.8 °C)   Ht 152.4 cm (60\")   Wt 47.2 kg (104 lb)   SpO2 98%   BMI 20.31 kg/m²     General appearance today is normal.       Physical Exam   Neurological: She has normal strength. She has a normal Finger-Nose-Finger Test and a normal Heel to Shin Test.   Reflex Scores:       Bicep reflexes are 1+ on the right side and 1+ on the left side.       Brachioradialis reflexes are 1+ on the right side " and 1+ on the left side.       Patellar reflexes are 1+ on the right side and 1+ on the left side.  Psychiatric: Her speech is normal.        Neurologic Exam     Mental Status   Speech: speech is normal   Level of consciousness: alert  Normal comprehension.     Cranial Nerves   Cranial nerves II through XII intact.     Motor Exam   Muscle bulk: normal  Right arm tone: increased  Left arm tone: increased    Strength   Strength 5/5 throughout.     Sensory Exam   Light touch normal.     Gait, Coordination, and Reflexes     Gait  Gait: (in wheelchair)    Coordination   Finger to nose coordination: normal  Heel to shin coordination: normal    Tremor   Resting tremor: present    Reflexes   Right brachioradialis: 1+  Left brachioradialis: 1+  Right biceps: 1+  Left biceps: 1+  Right patellar: 1+  Left patellar: 1+          Assessment/Plan   Chula was seen today for parkinson's disease.    Diagnoses and all orders for this visit:    Impairment of balance  -     TSH  -     Vitamin B12  -     Folate    Tremor  -     TSH  -     Vitamin B12  -     Folate          Discussion/Summary   Chula Chen comes to clinic today for evaluation of tremor and balance impairment. Given her history and examination today, I am somewhat concerned about a potential parkinsonism in addition to her history of Essential Tremor. This was discussed in detail. It was elected to obtain screening blood work . We also discussed potentially obtaining a head CT, which was deferred for now. After discussing potential treatment options, it was elected to discontinue her Abilify and continue on primidone unchanged. She will also continue working closely with PT. She will then follow up in 2 months , or sooner if needed.   I spent 45 minutes face to face with the patient and family with 25 minutes spent on discussing diagnosis, prognosis, diagnostic testing, evaluation, current status, treatment options and management as discussed above.       As part  of this visit I reviewed prior lab results and obtained additional history from the family which is incorporated in the HPI.      Loree Moreira PA-C

## 2020-09-02 ENCOUNTER — TELEPHONE (OUTPATIENT)
Dept: NEUROLOGY | Facility: CLINIC | Age: 85
End: 2020-09-02

## 2020-09-02 NOTE — TELEPHONE ENCOUNTER
Unfortunately, I cannot call her. We can provide her with the medical notes from yesterday's appt if she is on the release of information and she can certainly relay any concerns. In the future, I would recommend having her on speaker phone during the visits if the patient has a phone with this capability. So sorry. Thanks

## 2020-09-02 NOTE — TELEPHONE ENCOUNTER
PT DAUGHTER ZAIDA CALLED TO ASK IF KAMRON CAN CALL HER TO DISCUSS HER MOTHERS APPT YESTERDAY. SHE LIVES IN [place] AND WAS UNABLE TO BE HERE YESTERDAY    ZAIDA [phone] BEST CALL BACK NUMBER

## 2020-09-03 NOTE — TELEPHONE ENCOUNTER
"Daughter states she is concerned because mom is loosing weight and her face seems \"pudgy\". Can this be due to Parkinson or should she consult the PCP in the assisted living facility her mom is in?     How will the parkinson be diagnosed? She would like to know if this will be done per CT Scan? If so, can this be done prior to her next appt?           "

## 2020-09-03 NOTE — TELEPHONE ENCOUNTER
I would bring that up with her PCP as I am not sure that it would be related.    The CT we are doing is to help other out other conditions that could contribute to her symptoms. The diagnosis of Parkinson's more really based off her history and physical examination. I wanted to rule out other potential causes and discontinue her Abilify before making a diagnosis as sometimes the side effects from this medication can cause tremor, balance impairment, etc.     Hope that helps. Thanks!

## 2020-09-03 NOTE — TELEPHONE ENCOUNTER
S/W daughter to inform her of Loree's suggestion. Advised to speak with PCP regarding issues with weight and puffiness. Daughter states she will talk to her mom to decide if she wasnts a CT Scan.

## 2020-09-15 ENCOUNTER — TELEPHONE (OUTPATIENT)
Dept: INTERNAL MEDICINE | Facility: CLINIC | Age: 85
End: 2020-09-15

## 2020-09-15 NOTE — TELEPHONE ENCOUNTER
Patients daughter Deana called asking that Jazmine sign off on home health orders from July. She states that he signed off on June but missed July.     Called POA

## 2020-10-23 ENCOUNTER — TELEPHONE (OUTPATIENT)
Dept: NEUROLOGY | Facility: CLINIC | Age: 85
End: 2020-10-23

## 2020-10-23 NOTE — TELEPHONE ENCOUNTER
PATIENTS DAUGHTER ZAIDA CALLED IN TODAY AND WANTED TO MAKE SURE KAMRON NANCE KNOW THAT PATIENT IS HAVING URGUES TO USE THE RESTROOM , AND SHE STATES MOST TIME SHE CANT GO, SHE STATES ITS A SYMPTOMS OF PARKINSON'S AND WAS WONDERING WHAT TESTING WILL BE SOON.    PLEASE, ADVISE    [phone]

## 2020-10-23 NOTE — TELEPHONE ENCOUNTER
I would first recommend reaching out to her PCP as this can potentially be a sign of a UTI. Thanks

## 2020-10-26 NOTE — TELEPHONE ENCOUNTER
Spoke with patient's daughter Deana. She is having a procedure done where they check her bladder before her appointment with us, so they will inform us with what they find.

## 2020-11-03 ENCOUNTER — OFFICE VISIT (OUTPATIENT)
Dept: NEUROLOGY | Facility: CLINIC | Age: 85
End: 2020-11-03

## 2020-11-03 VITALS — TEMPERATURE: 98.7 F

## 2020-11-03 DIAGNOSIS — R26.89 IMPAIRMENT OF BALANCE: Primary | ICD-10-CM

## 2020-11-03 DIAGNOSIS — R25.1 TREMOR: ICD-10-CM

## 2020-11-03 PROCEDURE — 99214 OFFICE O/P EST MOD 30 MIN: CPT | Performed by: PHYSICIAN ASSISTANT

## 2020-11-03 NOTE — PROGRESS NOTES
Subjective     Chief Complaint: tremor      History of Present Illness   Chula Chen is a 89 y.o. female who returns to clinic today for evaluation of tremor. She initially noted an intention tremor in her hands bilaterally several years ago, primarily noted while eating and writing. She is currently taking primidone 50mg TID, which has been beneficial. She has also noted a resting tremor in her hands bilaterally for at least several months. This has worsened over time. There is associated balance impairment and a shuffling gait. She may also note associated bradykinesia and potential gait ignition failure. There are no clear modifying factors. She is currently working with PT at the [facility], which has been beneficial.      Additionally, she reports waking up with diffuse shaking every night. The shaking lasts for several minutes before resolving. Unfortunately, the history regarding this is a bit unclear. However, she denies any associated confusion.     Today: Since her last visit in 9/20, she feels that her tremor has significantly improved with the discontinuation of Abilify. Her balance is essentially unchanged. She has had 1-2 falls since her last visit, though fortunately without significant injury. Her shaking is also unchanged.      I have reviewed and confirmed the past family, social and medical history as accurate on 11/3/2020.     Review of Systems   Constitutional: Negative.    HENT: Negative.    Eyes: Negative.    Respiratory: Negative.    Cardiovascular: Negative.    Gastrointestinal: Negative.    Endocrine: Negative.    Genitourinary: Negative.    Musculoskeletal: Negative.    Skin: Negative.    Allergic/Immunologic: Negative.    Neurological: Positive for tremors.   Hematological: Negative.    Psychiatric/Behavioral: Negative.        Objective     Temp 98.7 °F (37.1 °C)     General appearance today is normal.       Physical Exam  Neurological:      Coordination: Finger-Nose-Finger Test and  Heel to Shin Test normal.      Deep Tendon Reflexes: Strength normal.   Psychiatric:         Speech: Speech normal.          Neurologic Exam     Mental Status   Speech: speech is normal   Level of consciousness: alert  Normal comprehension.     Cranial Nerves   Cranial nerves II through XII intact.     Motor Exam   Muscle bulk: normal  Overall muscle tone: normal    Strength   Strength 5/5 throughout.     Sensory Exam   Light touch normal.     Gait, Coordination, and Reflexes     Gait  Gait: (in wheelchair)    Coordination   Finger to nose coordination: normal  Heel to shin coordination: normal    Tremor   Resting tremor: present (very mild)        Assessment/Plan   Diagnoses and all orders for this visit:    1. Impairment of balance (Primary)    2. Tremor          Discussion/Summary   Chula Chen returns to clinic today for evaluation of  tremor and balance impairment. I remain somewhat concerned about a potential parkinsonism in addition to her history of Essential Tremor. I again reviewed her current status and treatment options. We discussed potentially obtaining a head CT, which was reasonably declined. We also discussed adding a trial of Sinemet, which was also reasonably deferred. She will simply continue on primidone unchanged and will continue working closely with PT. She will then follow up in 5 months, or sooner if needed.   I spent 25 minutes face to face with the patient and  with 15 minutes spent on discussing diagnosis, prognosis, evaluation, current status, treatment options and management as discussed above.       As part of this visit I reviewed prior lab results and obtained additional history from the family which is incorporated in the HPI.      Loree Moreira PA-C

## 2022-11-23 ENCOUNTER — APPOINTMENT (OUTPATIENT)
Dept: GENERAL RADIOLOGY | Facility: HOSPITAL | Age: 87
End: 2022-11-23

## 2022-11-23 ENCOUNTER — HOSPITAL ENCOUNTER (EMERGENCY)
Facility: HOSPITAL | Age: 87
Discharge: NURSING, REHAB OR LONG-TERM CARE | End: 2022-11-24
Attending: EMERGENCY MEDICINE | Admitting: EMERGENCY MEDICINE
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
## 2022-11-23 DIAGNOSIS — S22.41XK CLOSED FRACTURE OF MULTIPLE RIBS OF RIGHT SIDE WITH NONUNION, SUBSEQUENT ENCOUNTER: ICD-10-CM

## 2022-11-23 DIAGNOSIS — R07.81 RIB PAIN ON RIGHT SIDE: ICD-10-CM

## 2022-11-23 PROCEDURE — 99284 EMERGENCY DEPT VISIT MOD MDM: CPT

## 2022-11-23 PROCEDURE — 71101 X-RAY EXAM UNILAT RIBS/CHEST: CPT

## 2022-11-23 RX ORDER — HYDROCODONE BITARTRATE AND ACETAMINOPHEN 5; 325 MG/1; MG/1
1 TABLET ORAL EVERY 6 HOURS PRN
Qty: 12 TABLET | Refills: 0 | Status: SHIPPED | OUTPATIENT
Start: 2022-11-23 | End: 2022-11-26

## 2022-11-23 RX ORDER — HYDROCODONE BITARTRATE AND ACETAMINOPHEN 5; 325 MG/1; MG/1
1 TABLET ORAL ONCE
Status: COMPLETED | OUTPATIENT
Start: 2022-11-23 | End: 2022-11-23

## 2022-11-23 RX ADMIN — HYDROCODONE BITARTRATE AND ACETAMINOPHEN 1 TABLET: 5; 325 TABLET ORAL at 22:36

## 2022-11-24 VITALS
RESPIRATION RATE: 16 BRPM | WEIGHT: 123 LBS | DIASTOLIC BLOOD PRESSURE: 63 MMHG | BODY MASS INDEX: 24.15 KG/M2 | OXYGEN SATURATION: 92 % | HEIGHT: 60 IN | HEART RATE: 112 BPM | SYSTOLIC BLOOD PRESSURE: 121 MMHG | TEMPERATURE: 98.9 F

## 2022-11-24 NOTE — DISCHARGE INSTRUCTIONS
Symptomatic care is recommended. Take all medications as prescribed and instructed. Follow up with your primary care as directed or return to Emergency Department with worsening of symptoms.

## 2022-11-24 NOTE — ED PROVIDER NOTES
[place]    EMERGENCY DEPARTMENT ENCOUNTER      Pt Name: Chula Chen  MRN: [id number]  YOB: 1931  Date of evaluation: 11/23/2022  Provider: DAYANA Wright    CHIEF COMPLAINT       Chief Complaint   Patient presents with   • Fall         HISTORY OF PRESENT ILLNESS  (Location/Symptom, Timing/Onset, Context/Setting, Quality, Duration, Modifying Factors, Severity.)   Chula Chen is a 89 y.o. female who presents to the emergency department who presents to the ED with complaints of right rib pain following a slip and fall this morning in the shower. Patient presents from [facility] where she experienced a mechanical fall in the shower this morning and X-rays obtained via facility staff that showed right rib fractures. Patient noted she has not had significant pain throughout the day. She states pain is worse when she takes a deep breath. She reports improvement with rest. Patient reports that she has had associated symptoms of nausea and some anxiety throughout the day today. Family shares they are concerned that she may have a pneumothorax and thus the reason for her reassessment today.     HPI   Nursing notes were reviewed.    REVIEW OF SYSTEMS    (2-9 systems for level 4, 10 or more for level 5)   Review of Systems   Constitutional: Positive for activity change. Negative for chills, fatigue and fever.   Respiratory: Negative.    Cardiovascular: Negative.    Gastrointestinal: Positive for nausea. Negative for vomiting.   Genitourinary: Negative.    Musculoskeletal: Positive for arthralgias and myalgias.   Psychiatric/Behavioral: The patient is nervous/anxious.         All systems reviewed and negative except for those discussed in HPI.   PAST MEDICAL HISTORY     Past Medical History:   Diagnosis Date   • Cystocele, midline    • Hemorrhoids          SURGICAL HISTORY       Past Surgical History:   Procedure Laterality Date   • BREAST BIOPSY      cyst removed   • CATARACT  EXTRACTION Bilateral 2005   • THYROID SURGERY      nodule removal, Benign         CURRENT MEDICATIONS     No current facility-administered medications for this encounter.    Current Outpatient Medications:   •  acetaminophen (TYLENOL) 325 MG tablet, Take  by mouth Every 6 (Six) Hours As Needed for Mild Pain ., Disp: , Rfl:   •  amLODIPine (NORVASC) 2.5 MG tablet, TAKE ONE TABLET BY MOUTH DAILY, Disp: 30 tablet, Rfl: 5  •  citalopram (CeleXA) 40 MG tablet, Take 1 tablet by mouth Daily., Disp: 90 tablet, Rfl: 2  •  clonazePAM (KlonoPIN) 0.5 MG tablet, Take 1 tablet by mouth At Night As Needed for Anxiety., Disp: 30 tablet, Rfl: 2  •  dorzolamide-timolol (COSOPT) 22.3-6.8 MG/ML ophthalmic solution, , Disp: , Rfl:   •  HYDROcodone-acetaminophen (NORCO) 5-325 MG per tablet, Take 1 tablet by mouth Every 6 (Six) Hours As Needed for Moderate Pain or Mild Pain for up to 3 days., Disp: 12 tablet, Rfl: 0  •  Mirabegron ER (Myrbetriq) 25 MG tablet sustained-release 24 hour 24 hr tablet, Take 1 tablet by mouth Daily., Disp: 30 tablet, Rfl: 5  •  Multiple Vitamins-Minerals (MULTIVITAMIN ADULTS) tablet, Take  by mouth., Disp: , Rfl:   •  primidone (MYSOLINE) 50 MG tablet, 1 in am, 1 at noon, and 2 at bedtime, Disp: 120 tablet, Rfl: 5  •  traZODone (DESYREL) 50 MG tablet, Take 1 tablet by mouth Every Night., Disp: 90 tablet, Rfl: 3  •  tuberculin (Aplisol) 5 UNIT/0.1ML injection, Inject  into the appropriate area of the skin as directed by provider every night at bedtime., Disp: , Rfl:     ALLERGIES     Patient has no known allergies.    FAMILY HISTORY       Family History   Problem Relation Age of Onset   • Diabetes Mother    • Heart attack Father    • Diabetes Sister    • Breast cancer Daughter           SOCIAL HISTORY       Social History     Socioeconomic History   • Marital status:    Tobacco Use   • Smoking status: Former   • Smokeless tobacco: Never   Substance and Sexual Activity   • Alcohol use: Never   • Drug use:  Never         PHYSICAL EXAM    (up to 7 for level 4, 8 or more for level 5)   Physical Exam  Vitals and nursing note reviewed.   Constitutional:       General: She is not in acute distress.     Appearance: Normal appearance. She is well-developed. She is not ill-appearing or toxic-appearing.   HENT:      Head: Normocephalic and atraumatic.      Nose: Nose normal.      Mouth/Throat:      Mouth: Mucous membranes are moist.   Eyes:      Extraocular Movements: Extraocular movements intact.   Cardiovascular:      Rate and Rhythm: Normal rate and regular rhythm.   Pulmonary:      Effort: Pulmonary effort is normal. No respiratory distress.      Breath sounds: Normal breath sounds.   Chest:      Chest wall: Tenderness present.   Abdominal:      General: There is no distension.      Palpations: Abdomen is soft.      Tenderness: There is no abdominal tenderness.   Musculoskeletal:         General: Normal range of motion.      Cervical back: Normal range of motion.   Skin:     General: Skin is warm and dry.   Neurological:      General: No focal deficit present.      Mental Status: She is alert.   Psychiatric:         Behavior: Behavior normal.         Thought Content: Thought content normal.         Judgment: Judgment normal.          DIAGNOSTIC RESULTS     EKG: All EKGs are interpreted by the Emergency Department Physician who either signs or Co-signs this chart in the absence of a cardiologist.    No orders to display       RADIOLOGY:   Non-plain film images such as CT, Ultrasound and MRI are read by the radiologist. Plain radiographic images are visualized and preliminarily interpreted by the emergency physician with the below findings:      [x] Radiologist's Report Reviewed:  XR Ribs Right With PA Chest   Final Result   Normal cardiomediastinal silhouette within normal limits. There is   evidence of a large hiatal hernia as evidenced by a rounded density   projecting over the midline chest and right lower thorax, with  evidence   of herniated colon. There are likely small bilateral pleural effusions   with streaky bibasilar opacities likely reflecting associated   atelectasis. No evidence of pneumothorax. There are acute moderately   displaced fractures of the lateral right eighth, ninth, and 10th ribs.   There is a moderate compression deformity of the T12 vertebral body, age   indeterminant.       This report was finalized on 11/23/2022 10:46 PM by Gaudencio Jim MD.                ED BEDSIDE ULTRASOUND:   Performed by ED Physician - none    LABS:    I have reviewed and interpreted all of the currently available lab results from this visit (if applicable):       All other labs were within normal range or not returned as of this dictation.      EMERGENCY DEPARTMENT COURSE and DIFFERENTIAL DIAGNOSIS/MDM:   Vitals:    Vitals:    11/23/22 2026 11/23/22 2027 11/23/22 2246 11/24/22 0000   BP: 152/85  110/74 121/63   BP Location:   Right arm    Patient Position:   Sitting    Pulse:  110 112    Resp:  16 16    Temp:       TempSrc:       SpO2: 90%  92% 92%   Weight:       Height:           ED Course as of 11/24/22 1342   Wed Nov 23, 2022   2345 In summary this is a 89 year old female who presents to the ED with complaints of right rib pain following a mechanical fall in the shower earlier this morning at her nursing home facility. Tenderness to palpation on exam. X-ray demonstrates acute moderately displaced fractures of the lateral right eighth, ninth, and 10th ribs. Differential diagnosis includes previously diagnosed rib fractures, pneumothorax or other traumatic or infectious lung process. Patient tolerating fractures well. At time of discharge disposition patient is afebrile, nontoxic appearing, vital signs stable and able to maintain O2 sats of 92% on room air. Patient will be discharged home with symptomatic care and outpatient follow up.  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  The patient/family indicated understanding of these instructions.  I spent adequate time at the bedside preceding discharge necessary to personally discuss the aftercare instructions, giving patient education, providing explanations of the results of our evaluations/findings, and my decision making to assure that the patient/family understand the plan of care.  Time was allotted to answer questions at that time and throughout the ED course.  Emphasis was placed on timely follow-up after discharge.  I also discussed the potential for the development of an acute emergent condition requiring further evaluation, admission, or even surgical intervention. I discussed that we found nothing during the visit today indicating the need for further workup, admission, or the presence of an unstable medical condition.  I encouraged the patient to return to the emergency department immediately for ANY concerns, worsening, new complaints, or if symptoms persist and unable to seek follow-up in a timely fashion.  The patient/family expressed understanding and agreement with this plan.  The patient will follow-up with primary care for reevaluation.       MEDICATIONS ADMINISTERED IN ED:  Medications   HYDROcodone-acetaminophen (NORCO) 5-325 MG per tablet 1 tablet (1 tablet Oral Given 11/23/22 6082)       PROCEDURES:  Procedures           CRITICAL CARE TIME    Total Critical Care time was 0 minutes, excluding separately reportable procedures.   There was a high probability of clinically significant/life threatening deterioration in the patient's condition which required my urgent intervention.      FINAL IMPRESSION      1. Fall, subsequent encounter    2. Rib pain on right side    3. 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, subsequent encounter          DISPOSITION/PLAN     ED Disposition     ED Disposition   Discharge    Condition   Stable    Comment   --             PATIENT REFERRED TO:  PATIENT CONNECTION - [place] [id number]  [phone]  Call   As needed    [facility] Emergency Department  [address] [address] [phone]  Go to   If symptoms worsen      DISCHARGE MEDICATIONS:     Medication List      START taking these medications    HYDROcodone-acetaminophen 5-325 MG per tablet  Commonly known as: NORCO  Take 1 tablet by mouth Every 6 (Six) Hours As Needed for Moderate Pain or Mild Pain for up to 3 days.        CONTINUE taking these medications    acetaminophen 325 MG tablet  Commonly known as: TYLENOL     amLODIPine 2.5 MG tablet  Commonly known as: NORVASC  TAKE ONE TABLET BY MOUTH DAILY     Aplisol 5 UNIT/0.1ML injection  Generic drug: tuberculin     citalopram 40 MG tablet  Commonly known as: CeleXA  Take 1 tablet by mouth Daily.     clonazePAM 0.5 MG tablet  Commonly known as: KlonoPIN  Take 1 tablet by mouth At Night As Needed for Anxiety.     dorzolamide-timolol 22.3-6.8 MG/ML ophthalmic solution  Commonly known as: COSOPT     Mirabegron ER 25 MG tablet sustained-release 24 hour 24 hr tablet  Commonly known as: Myrbetriq  Take 1 tablet by mouth Daily.     Multivitamin Adults tablet tablet  Generic drug: multivitamin with minerals     primidone 50 MG tablet  Commonly known as: MYSOLINE  1 in am, 1 at noon, and 2 at bedtime     traZODone 50 MG  tablet  Commonly known as: DESYREL  Take 1 tablet by mouth Every Night.           Where to Get Your Medications      These medications were sent to [facility] [id number] - [place], [place] - [address] [place] [address] [address] [place] - [phone]  - [phone] [place] [id number]    Phone: [phone]   · HYDROcodone-acetaminophen 5-325 MG per tablet         Comment: Please note this report has been produced using speech recognition software.      DAYANA Wright Jason C, PA  11/24/22 2090

## 2023-07-06 PROBLEM — J18.9 PNEUMONIA: Status: ACTIVE | Noted: 2023-07-06

## 2023-07-06 PROBLEM — U07.1 COVID-19 VIRUS DETECTED: Status: ACTIVE | Noted: 2023-07-06

## 2023-07-11 PROBLEM — D89.831 CYTOKINE RELEASE SYNDROME, GRADE 1: Status: ACTIVE | Noted: 2023-07-11

## 2025-07-15 ENCOUNTER — APPOINTMENT (OUTPATIENT)
Facility: HOSPITAL | Age: OVER 89
DRG: 522 | End: 2025-07-15
Payer: MEDICARE

## 2025-07-15 ENCOUNTER — ANESTHESIA (OUTPATIENT)
Dept: PERIOP | Facility: HOSPITAL | Age: OVER 89
End: 2025-07-15
Payer: MEDICARE

## 2025-07-15 ENCOUNTER — APPOINTMENT (OUTPATIENT)
Dept: GENERAL RADIOLOGY | Facility: HOSPITAL | Age: OVER 89
DRG: 522 | End: 2025-07-15
Payer: MEDICARE

## 2025-07-15 ENCOUNTER — HOSPITAL ENCOUNTER (INPATIENT)
Facility: HOSPITAL | Age: OVER 89
LOS: 4 days | Discharge: NURSING, REHAB OR LONG-TERM CARE | DRG: 522 | End: 2025-07-19
Attending: EMERGENCY MEDICINE | Admitting: INTERNAL MEDICINE
Payer: MEDICARE

## 2025-07-15 ENCOUNTER — ANESTHESIA EVENT (OUTPATIENT)
Dept: PERIOP | Facility: HOSPITAL | Age: OVER 89
End: 2025-07-15
Payer: MEDICARE

## 2025-07-15 ENCOUNTER — ANESTHESIA EVENT CONVERTED (OUTPATIENT)
Dept: ANESTHESIOLOGY | Facility: HOSPITAL | Age: OVER 89
DRG: 522 | End: 2025-07-15
Payer: MEDICARE

## 2025-07-15 DIAGNOSIS — S72.001D CLOSED FRACTURE OF RIGHT HIP WITH ROUTINE HEALING, SUBSEQUENT ENCOUNTER: Primary | ICD-10-CM

## 2025-07-15 DIAGNOSIS — S72.001A CLOSED FRACTURE OF NECK OF RIGHT FEMUR, INITIAL ENCOUNTER: ICD-10-CM

## 2025-07-15 DIAGNOSIS — S01.01XA LACERATION WITHOUT FOREIGN BODY OF SCALP, INITIAL ENCOUNTER: ICD-10-CM

## 2025-07-15 PROBLEM — S72.009A HIP FRACTURE: Status: ACTIVE | Noted: 2025-07-15

## 2025-07-15 LAB
ALBUMIN SERPL-MCNC: 3.3 G/DL (ref 3.5–5.2)
ALBUMIN/GLOB SERPL: 0.9 G/DL
ALP SERPL-CCNC: 152 U/L (ref 39–117)
ALT SERPL W P-5'-P-CCNC: 87 U/L (ref 1–33)
ANION GAP SERPL CALCULATED.3IONS-SCNC: 12 MMOL/L (ref 5–15)
AST SERPL-CCNC: 36 U/L (ref 1–32)
BACTERIA UR QL AUTO: ABNORMAL /HPF
BASOPHILS # BLD AUTO: 0.03 10*3/MM3 (ref 0–0.2)
BASOPHILS NFR BLD AUTO: 0.3 % (ref 0–1.5)
BILIRUB SERPL-MCNC: 0.2 MG/DL (ref 0–1.2)
BILIRUB UR QL STRIP: NEGATIVE
BUN SERPL-MCNC: 18.5 MG/DL (ref 8–23)
BUN/CREAT SERPL: 37.8 (ref 7–25)
CALCIUM SPEC-SCNC: 9.2 MG/DL (ref 8.2–9.6)
CHLORIDE SERPL-SCNC: 100 MMOL/L (ref 98–107)
CLARITY UR: ABNORMAL
CO2 SERPL-SCNC: 22 MMOL/L (ref 22–29)
COLOR UR: YELLOW
CREAT SERPL-MCNC: 0.49 MG/DL (ref 0.57–1)
DEPRECATED RDW RBC AUTO: 43.5 FL (ref 37–54)
EGFRCR SERPLBLD CKD-EPI 2021: 87.5 ML/MIN/1.73
EOSINOPHIL # BLD AUTO: 0.21 10*3/MM3 (ref 0–0.4)
EOSINOPHIL NFR BLD AUTO: 1.8 % (ref 0.3–6.2)
ERYTHROCYTE [DISTWIDTH] IN BLOOD BY AUTOMATED COUNT: 14.2 % (ref 12.3–15.4)
GLOBULIN UR ELPH-MCNC: 3.6 GM/DL
GLUCOSE SERPL-MCNC: 125 MG/DL (ref 65–99)
GLUCOSE UR STRIP-MCNC: NEGATIVE MG/DL
HCT VFR BLD AUTO: 40.2 % (ref 34–46.6)
HGB BLD-MCNC: 13.3 G/DL (ref 12–15.9)
HGB UR QL STRIP.AUTO: ABNORMAL
HYALINE CASTS UR QL AUTO: ABNORMAL /LPF
IMM GRANULOCYTES # BLD AUTO: 0.05 10*3/MM3 (ref 0–0.05)
IMM GRANULOCYTES NFR BLD AUTO: 0.4 % (ref 0–0.5)
KETONES UR QL STRIP: ABNORMAL
LEUKOCYTE ESTERASE UR QL STRIP.AUTO: ABNORMAL
LYMPHOCYTES # BLD AUTO: 2.69 10*3/MM3 (ref 0.7–3.1)
LYMPHOCYTES NFR BLD AUTO: 22.5 % (ref 19.6–45.3)
MAGNESIUM SERPL-MCNC: 2 MG/DL (ref 1.7–2.3)
MCH RBC QN AUTO: 27.6 PG (ref 26.6–33)
MCHC RBC AUTO-ENTMCNC: 33.1 G/DL (ref 31.5–35.7)
MCV RBC AUTO: 83.4 FL (ref 79–97)
MONOCYTES # BLD AUTO: 0.93 10*3/MM3 (ref 0.1–0.9)
MONOCYTES NFR BLD AUTO: 7.8 % (ref 5–12)
NEUTROPHILS NFR BLD AUTO: 67.2 % (ref 42.7–76)
NEUTROPHILS NFR BLD AUTO: 8.06 10*3/MM3 (ref 1.7–7)
NITRITE UR QL STRIP: NEGATIVE
PH UR STRIP.AUTO: 6 [PH] (ref 5–8)
PHOSPHATE SERPL-MCNC: 2.5 MG/DL (ref 2.5–4.5)
PLATELET # BLD AUTO: 432 10*3/MM3 (ref 140–450)
PMV BLD AUTO: 8.9 FL (ref 6–12)
POTASSIUM SERPL-SCNC: 3.8 MMOL/L (ref 3.5–5.2)
PROT SERPL-MCNC: 6.9 G/DL (ref 6–8.5)
PROT UR QL STRIP: ABNORMAL
QT INTERVAL: 352 MS
QTC INTERVAL: 480 MS
RBC # BLD AUTO: 4.82 10*6/MM3 (ref 3.77–5.28)
RBC # UR STRIP: ABNORMAL /HPF
REF LAB TEST METHOD: ABNORMAL
SODIUM SERPL-SCNC: 134 MMOL/L (ref 136–145)
SP GR UR STRIP: 1.02 (ref 1–1.03)
SQUAMOUS #/AREA URNS HPF: ABNORMAL /HPF
UROBILINOGEN UR QL STRIP: ABNORMAL
WBC # UR STRIP: ABNORMAL /HPF
WBC NRBC COR # BLD AUTO: 11.97 10*3/MM3 (ref 3.4–10.8)

## 2025-07-15 PROCEDURE — P9612 CATHETERIZE FOR URINE SPEC: HCPCS

## 2025-07-15 PROCEDURE — 0SRR019 REPLACEMENT OF RIGHT HIP JOINT, FEMORAL SURFACE WITH METAL SYNTHETIC SUBSTITUTE, CEMENTED, OPEN APPROACH: ICD-10-PCS | Performed by: ORTHOPAEDIC SURGERY

## 2025-07-15 PROCEDURE — 25010000002 LIDOCAINE PF 1% 1 % SOLUTION: Performed by: NURSE ANESTHETIST, CERTIFIED REGISTERED

## 2025-07-15 PROCEDURE — C1776 JOINT DEVICE (IMPLANTABLE): HCPCS | Performed by: ORTHOPAEDIC SURGERY

## 2025-07-15 PROCEDURE — 25010000002 FENTANYL CITRATE (PF) 100 MCG/2ML SOLUTION: Performed by: NURSE ANESTHETIST, CERTIFIED REGISTERED

## 2025-07-15 PROCEDURE — 25810000003 LACTATED RINGERS PER 1000 ML: Performed by: ANESTHESIOLOGY

## 2025-07-15 PROCEDURE — 99222 1ST HOSP IP/OBS MODERATE 55: CPT | Performed by: INTERNAL MEDICINE

## 2025-07-15 PROCEDURE — 90715 TDAP VACCINE 7 YRS/> IM: CPT | Performed by: EMERGENCY MEDICINE

## 2025-07-15 PROCEDURE — 90471 IMMUNIZATION ADMIN: CPT | Performed by: EMERGENCY MEDICINE

## 2025-07-15 PROCEDURE — C1713 ANCHOR/SCREW BN/BN,TIS/BN: HCPCS | Performed by: ORTHOPAEDIC SURGERY

## 2025-07-15 PROCEDURE — P9041 ALBUMIN (HUMAN),5%, 50ML: HCPCS | Performed by: ANESTHESIOLOGY

## 2025-07-15 PROCEDURE — 85025 COMPLETE CBC W/AUTO DIFF WBC: CPT | Performed by: EMERGENCY MEDICINE

## 2025-07-15 PROCEDURE — 73502 X-RAY EXAM HIP UNI 2-3 VIEWS: CPT

## 2025-07-15 PROCEDURE — 25010000002 ESMOLOL 100 MG/10ML SOLUTION: Performed by: ANESTHESIOLOGY

## 2025-07-15 PROCEDURE — 25010000002 ALBUMIN HUMAN 5% PER 50 ML: Performed by: ANESTHESIOLOGY

## 2025-07-15 PROCEDURE — 25010000002 ROPIVACAINE PER 1 MG: Performed by: NURSE ANESTHETIST, CERTIFIED REGISTERED

## 2025-07-15 PROCEDURE — 25010000002 TETANUS-DIPHTH-ACELL PERTUSSIS 5-2.5-18.5 LF-MCG/0.5 SUSPENSION PREFILLED SYRINGE: Performed by: EMERGENCY MEDICINE

## 2025-07-15 PROCEDURE — 70450 CT HEAD/BRAIN W/O DYE: CPT

## 2025-07-15 PROCEDURE — 80053 COMPREHEN METABOLIC PANEL: CPT | Performed by: EMERGENCY MEDICINE

## 2025-07-15 PROCEDURE — 25010000002 LIDOCAINE 1% - EPINEPHRINE 1:100000 1 %-1:100000 SOLUTION: Performed by: EMERGENCY MEDICINE

## 2025-07-15 PROCEDURE — 25010000002 SUGAMMADEX 200 MG/2ML SOLUTION: Performed by: ANESTHESIOLOGY

## 2025-07-15 PROCEDURE — 87088 URINE BACTERIA CULTURE: CPT | Performed by: INTERNAL MEDICINE

## 2025-07-15 PROCEDURE — 25010000002 CEFAZOLIN PER 500 MG: Performed by: ORTHOPAEDIC SURGERY

## 2025-07-15 PROCEDURE — 27236 TREAT THIGH FRACTURE: CPT | Performed by: PHYSICIAN ASSISTANT

## 2025-07-15 PROCEDURE — 25810000003 SODIUM CHLORIDE 0.9 % SOLUTION: Performed by: INTERNAL MEDICINE

## 2025-07-15 PROCEDURE — 81001 URINALYSIS AUTO W/SCOPE: CPT | Performed by: INTERNAL MEDICINE

## 2025-07-15 PROCEDURE — 83735 ASSAY OF MAGNESIUM: CPT | Performed by: INTERNAL MEDICINE

## 2025-07-15 PROCEDURE — 25010000002 PROPOFOL 10 MG/ML EMULSION: Performed by: NURSE ANESTHETIST, CERTIFIED REGISTERED

## 2025-07-15 PROCEDURE — 87186 SC STD MICRODIL/AGAR DIL: CPT | Performed by: INTERNAL MEDICINE

## 2025-07-15 PROCEDURE — 87086 URINE CULTURE/COLONY COUNT: CPT | Performed by: INTERNAL MEDICINE

## 2025-07-15 PROCEDURE — 36415 COLL VENOUS BLD VENIPUNCTURE: CPT

## 2025-07-15 PROCEDURE — 93005 ELECTROCARDIOGRAM TRACING: CPT | Performed by: INTERNAL MEDICINE

## 2025-07-15 PROCEDURE — 99222 1ST HOSP IP/OBS MODERATE 55: CPT | Performed by: ORTHOPAEDIC SURGERY

## 2025-07-15 PROCEDURE — 25010000002 ROPIVACAINE HCL-NACL 0.2-0.9 % SOLUTION: Performed by: NURSE ANESTHETIST, CERTIFIED REGISTERED

## 2025-07-15 PROCEDURE — 99285 EMERGENCY DEPT VISIT HI MDM: CPT | Performed by: EMERGENCY MEDICINE

## 2025-07-15 PROCEDURE — 93010 ELECTROCARDIOGRAM REPORT: CPT | Performed by: INTERNAL MEDICINE

## 2025-07-15 PROCEDURE — 84100 ASSAY OF PHOSPHORUS: CPT | Performed by: INTERNAL MEDICINE

## 2025-07-15 PROCEDURE — 27236 TREAT THIGH FRACTURE: CPT | Performed by: ORTHOPAEDIC SURGERY

## 2025-07-15 PROCEDURE — 25010000002 SODIUM CHLORIDE 0.9 % WITH KCL 20 MEQ 20-0.9 MEQ/L-% SOLUTION: Performed by: ORTHOPAEDIC SURGERY

## 2025-07-15 DEVICE — IMPLANTABLE DEVICE
Type: IMPLANTABLE DEVICE | Site: HIP | Status: FUNCTIONAL
Brand: ECHO FX™ HIP SYSTEM

## 2025-07-15 DEVICE — VIOLET ANTIBACTERIAL POLYDIOXANONE, KNOTLESS TISSUE CONTROL DEVICE
Type: IMPLANTABLE DEVICE | Site: HIP | Status: FUNCTIONAL
Brand: STRATAFIX

## 2025-07-15 DEVICE — IMPLANTABLE DEVICE
Type: IMPLANTABLE DEVICE | Site: HIP | Status: FUNCTIONAL
Brand: REFOBACIN® BONE CEMENT R

## 2025-07-15 DEVICE — IMPLANTABLE DEVICE
Type: IMPLANTABLE DEVICE | Site: HIP | Status: FUNCTIONAL
Brand: RINGLOC BI-POLAR HIP SYSTEM

## 2025-07-15 DEVICE — CAP PRT HIP BIPOL: Type: IMPLANTABLE DEVICE | Site: HIP | Status: FUNCTIONAL

## 2025-07-15 DEVICE — IMPLANTABLE DEVICE
Type: IMPLANTABLE DEVICE | Site: HIP | Status: FUNCTIONAL
Brand: BIOMET® HIP SYSTEM

## 2025-07-15 DEVICE — IMPLANTABLE DEVICE
Type: IMPLANTABLE DEVICE | Site: HIP | Status: FUNCTIONAL
Brand: DISTAL CENTRALIZER/CENTERING SLEEVE

## 2025-07-15 RX ORDER — MAGNESIUM HYDROXIDE 1200 MG/15ML
LIQUID ORAL AS NEEDED
Status: DISCONTINUED | OUTPATIENT
Start: 2025-07-15 | End: 2025-07-15 | Stop reason: HOSPADM

## 2025-07-15 RX ORDER — ENOXAPARIN SODIUM 100 MG/ML
30 INJECTION SUBCUTANEOUS DAILY
Status: DISCONTINUED | OUTPATIENT
Start: 2025-07-16 | End: 2025-07-19 | Stop reason: HOSPADM

## 2025-07-15 RX ORDER — NALOXONE HCL 0.4 MG/ML
0.4 VIAL (ML) INJECTION
Status: DISCONTINUED | OUTPATIENT
Start: 2025-07-15 | End: 2025-07-19 | Stop reason: HOSPADM

## 2025-07-15 RX ORDER — LIDOCAINE HYDROCHLORIDE AND EPINEPHRINE 10; 10 MG/ML; UG/ML
10 INJECTION, SOLUTION INFILTRATION; PERINEURAL ONCE
Status: COMPLETED | OUTPATIENT
Start: 2025-07-15 | End: 2025-07-15

## 2025-07-15 RX ORDER — MORPHINE SULFATE 2 MG/ML
1 INJECTION, SOLUTION INTRAMUSCULAR; INTRAVENOUS
Status: DISCONTINUED | OUTPATIENT
Start: 2025-07-15 | End: 2025-07-19 | Stop reason: HOSPADM

## 2025-07-15 RX ORDER — DEXMEDETOMIDINE HYDROCHLORIDE 100 UG/ML
INJECTION, SOLUTION INTRAVENOUS AS NEEDED
Status: DISCONTINUED | OUTPATIENT
Start: 2025-07-15 | End: 2025-07-15 | Stop reason: SURG

## 2025-07-15 RX ORDER — ONDANSETRON 4 MG/1
4 TABLET, ORALLY DISINTEGRATING ORAL EVERY 6 HOURS PRN
Status: DISCONTINUED | OUTPATIENT
Start: 2025-07-15 | End: 2025-07-19 | Stop reason: HOSPADM

## 2025-07-15 RX ORDER — ESMOLOL HYDROCHLORIDE 10 MG/ML
INJECTION INTRAVENOUS AS NEEDED
Status: DISCONTINUED | OUTPATIENT
Start: 2025-07-15 | End: 2025-07-15 | Stop reason: SURG

## 2025-07-15 RX ORDER — HYDROMORPHONE HYDROCHLORIDE 1 MG/ML
0.5 INJECTION, SOLUTION INTRAMUSCULAR; INTRAVENOUS; SUBCUTANEOUS
Status: DISCONTINUED | OUTPATIENT
Start: 2025-07-15 | End: 2025-07-15 | Stop reason: HOSPADM

## 2025-07-15 RX ORDER — SODIUM CHLORIDE AND POTASSIUM CHLORIDE 150; 900 MG/100ML; MG/100ML
50 INJECTION, SOLUTION INTRAVENOUS CONTINUOUS
Status: DISCONTINUED | OUTPATIENT
Start: 2025-07-15 | End: 2025-07-16

## 2025-07-15 RX ORDER — NALOXONE HCL 0.4 MG/ML
0.4 VIAL (ML) INJECTION
Status: DISCONTINUED | OUTPATIENT
Start: 2025-07-15 | End: 2025-07-15

## 2025-07-15 RX ORDER — MIDAZOLAM HYDROCHLORIDE 1 MG/ML
0.5 INJECTION, SOLUTION INTRAMUSCULAR; INTRAVENOUS
Status: DISCONTINUED | OUTPATIENT
Start: 2025-07-15 | End: 2025-07-15 | Stop reason: HOSPADM

## 2025-07-15 RX ORDER — ONDANSETRON 2 MG/ML
4 INJECTION INTRAMUSCULAR; INTRAVENOUS EVERY 6 HOURS PRN
Status: DISCONTINUED | OUTPATIENT
Start: 2025-07-15 | End: 2025-07-19 | Stop reason: HOSPADM

## 2025-07-15 RX ORDER — SODIUM CHLORIDE 0.9 % (FLUSH) 0.9 %
10 SYRINGE (ML) INJECTION AS NEEDED
Status: DISCONTINUED | OUTPATIENT
Start: 2025-07-15 | End: 2025-07-15 | Stop reason: HOSPADM

## 2025-07-15 RX ORDER — SODIUM CHLORIDE, SODIUM LACTATE, POTASSIUM CHLORIDE, CALCIUM CHLORIDE 600; 310; 30; 20 MG/100ML; MG/100ML; MG/100ML; MG/100ML
9 INJECTION, SOLUTION INTRAVENOUS CONTINUOUS
Status: ACTIVE | OUTPATIENT
Start: 2025-07-16 | End: 2025-07-16

## 2025-07-15 RX ORDER — SODIUM CHLORIDE 9 MG/ML
40 INJECTION, SOLUTION INTRAVENOUS AS NEEDED
Status: DISCONTINUED | OUTPATIENT
Start: 2025-07-15 | End: 2025-07-19 | Stop reason: HOSPADM

## 2025-07-15 RX ORDER — FAMOTIDINE 20 MG/1
20 TABLET, FILM COATED ORAL ONCE
Status: DISCONTINUED | OUTPATIENT
Start: 2025-07-15 | End: 2025-07-15 | Stop reason: HOSPADM

## 2025-07-15 RX ORDER — SODIUM CHLORIDE 0.9 % (FLUSH) 0.9 %
3-10 SYRINGE (ML) INJECTION AS NEEDED
Status: DISCONTINUED | OUTPATIENT
Start: 2025-07-15 | End: 2025-07-19 | Stop reason: HOSPADM

## 2025-07-15 RX ORDER — SODIUM CHLORIDE 0.9 % (FLUSH) 0.9 %
10 SYRINGE (ML) INJECTION AS NEEDED
Status: DISCONTINUED | OUTPATIENT
Start: 2025-07-15 | End: 2025-07-19 | Stop reason: HOSPADM

## 2025-07-15 RX ORDER — SODIUM CHLORIDE 9 MG/ML
80 INJECTION, SOLUTION INTRAVENOUS CONTINUOUS
Status: DISCONTINUED | OUTPATIENT
Start: 2025-07-15 | End: 2025-07-16

## 2025-07-15 RX ORDER — ALBUMIN HUMAN 50 G/1000ML
500 SOLUTION INTRAVENOUS ONCE
Status: COMPLETED | OUTPATIENT
Start: 2025-07-15 | End: 2025-07-15

## 2025-07-15 RX ORDER — TRANEXAMIC ACID 10 MG/ML
1000 INJECTION, SOLUTION INTRAVENOUS ONCE
Status: COMPLETED | OUTPATIENT
Start: 2025-07-15 | End: 2025-07-15

## 2025-07-15 RX ORDER — ACETAMINOPHEN 650 MG/1
650 SUPPOSITORY RECTAL EVERY 4 HOURS PRN
Status: DISCONTINUED | OUTPATIENT
Start: 2025-07-15 | End: 2025-07-19 | Stop reason: HOSPADM

## 2025-07-15 RX ORDER — LIDOCAINE HYDROCHLORIDE 10 MG/ML
INJECTION, SOLUTION EPIDURAL; INFILTRATION; INTRACAUDAL; PERINEURAL AS NEEDED
Status: DISCONTINUED | OUTPATIENT
Start: 2025-07-15 | End: 2025-07-15 | Stop reason: SURG

## 2025-07-15 RX ORDER — CLONAZEPAM 0.5 MG/1
0.5 TABLET ORAL NIGHTLY PRN
Status: DISCONTINUED | OUTPATIENT
Start: 2025-07-15 | End: 2025-07-19 | Stop reason: HOSPADM

## 2025-07-15 RX ORDER — FENTANYL CITRATE 50 UG/ML
INJECTION, SOLUTION INTRAMUSCULAR; INTRAVENOUS AS NEEDED
Status: DISCONTINUED | OUTPATIENT
Start: 2025-07-15 | End: 2025-07-15 | Stop reason: SURG

## 2025-07-15 RX ORDER — LIDOCAINE HYDROCHLORIDE 10 MG/ML
0.5 INJECTION, SOLUTION EPIDURAL; INFILTRATION; INTRACAUDAL; PERINEURAL ONCE AS NEEDED
Status: DISCONTINUED | OUTPATIENT
Start: 2025-07-15 | End: 2025-07-15 | Stop reason: HOSPADM

## 2025-07-15 RX ORDER — ROPIVACAINE HYDROCHLORIDE 2 MG/ML
INJECTION, SOLUTION EPIDURAL; INFILTRATION; PERINEURAL CONTINUOUS
Status: DISCONTINUED | OUTPATIENT
Start: 2025-07-15 | End: 2025-07-19 | Stop reason: HOSPADM

## 2025-07-15 RX ORDER — DORZOLAMIDE HYDROCHLORIDE AND TIMOLOL MALEATE 20; 5 MG/ML; MG/ML
SOLUTION/ DROPS OPHTHALMIC DAILY
Status: DISCONTINUED | OUTPATIENT
Start: 2025-07-15 | End: 2025-07-15 | Stop reason: CLARIF

## 2025-07-15 RX ORDER — ROCURONIUM BROMIDE 10 MG/ML
INJECTION, SOLUTION INTRAVENOUS AS NEEDED
Status: DISCONTINUED | OUTPATIENT
Start: 2025-07-15 | End: 2025-07-15 | Stop reason: SURG

## 2025-07-15 RX ORDER — TIMOLOL MALEATE 5 MG/ML
1 SOLUTION/ DROPS OPHTHALMIC DAILY
Status: DISCONTINUED | OUTPATIENT
Start: 2025-07-15 | End: 2025-07-19 | Stop reason: HOSPADM

## 2025-07-15 RX ORDER — LIDOCAINE HYDROCHLORIDE AND EPINEPHRINE 10; 10 MG/ML; UG/ML
10 INJECTION, SOLUTION INFILTRATION; PERINEURAL ONCE
Status: DISCONTINUED | OUTPATIENT
Start: 2025-07-15 | End: 2025-07-15

## 2025-07-15 RX ORDER — SODIUM CHLORIDE 0.9 % (FLUSH) 0.9 %
3 SYRINGE (ML) INJECTION EVERY 12 HOURS SCHEDULED
Status: DISCONTINUED | OUTPATIENT
Start: 2025-07-15 | End: 2025-07-19 | Stop reason: HOSPADM

## 2025-07-15 RX ORDER — ROPIVACAINE HYDROCHLORIDE 5 MG/ML
INJECTION, SOLUTION EPIDURAL; INFILTRATION; PERINEURAL
Status: COMPLETED | OUTPATIENT
Start: 2025-07-15 | End: 2025-07-15

## 2025-07-15 RX ORDER — DOCUSATE SODIUM 100 MG/1
100 CAPSULE, LIQUID FILLED ORAL 2 TIMES DAILY PRN
Status: DISCONTINUED | OUTPATIENT
Start: 2025-07-15 | End: 2025-07-19 | Stop reason: HOSPADM

## 2025-07-15 RX ORDER — DORZOLAMIDE HCL 20 MG/ML
1 SOLUTION/ DROPS OPHTHALMIC DAILY
Status: DISCONTINUED | OUTPATIENT
Start: 2025-07-15 | End: 2025-07-19 | Stop reason: HOSPADM

## 2025-07-15 RX ORDER — ONDANSETRON 4 MG/1
4 TABLET, ORALLY DISINTEGRATING ORAL EVERY 6 HOURS PRN
Status: DISCONTINUED | OUTPATIENT
Start: 2025-07-15 | End: 2025-07-15

## 2025-07-15 RX ORDER — SODIUM CHLORIDE 0.9 % (FLUSH) 0.9 %
10 SYRINGE (ML) INJECTION EVERY 12 HOURS SCHEDULED
Status: DISCONTINUED | OUTPATIENT
Start: 2025-07-15 | End: 2025-07-19 | Stop reason: HOSPADM

## 2025-07-15 RX ORDER — PRIMIDONE 50 MG/1
50 TABLET ORAL 3 TIMES DAILY
Status: DISCONTINUED | OUTPATIENT
Start: 2025-07-15 | End: 2025-07-19 | Stop reason: HOSPADM

## 2025-07-15 RX ORDER — SODIUM CHLORIDE 0.9 % (FLUSH) 0.9 %
10 SYRINGE (ML) INJECTION EVERY 12 HOURS SCHEDULED
Status: DISCONTINUED | OUTPATIENT
Start: 2025-07-15 | End: 2025-07-15 | Stop reason: HOSPADM

## 2025-07-15 RX ORDER — CITALOPRAM HYDROBROMIDE 20 MG/1
20 TABLET ORAL DAILY
Status: DISCONTINUED | OUTPATIENT
Start: 2025-07-15 | End: 2025-07-19 | Stop reason: HOSPADM

## 2025-07-15 RX ORDER — FENTANYL CITRATE 50 UG/ML
50 INJECTION, SOLUTION INTRAMUSCULAR; INTRAVENOUS
Status: DISCONTINUED | OUTPATIENT
Start: 2025-07-15 | End: 2025-07-15 | Stop reason: HOSPADM

## 2025-07-15 RX ORDER — ACETAMINOPHEN 160 MG/5ML
650 SOLUTION ORAL EVERY 4 HOURS PRN
Status: DISCONTINUED | OUTPATIENT
Start: 2025-07-15 | End: 2025-07-19 | Stop reason: HOSPADM

## 2025-07-15 RX ORDER — TRAMADOL HYDROCHLORIDE 50 MG/1
50 TABLET ORAL EVERY 6 HOURS PRN
Status: DISCONTINUED | OUTPATIENT
Start: 2025-07-15 | End: 2025-07-19 | Stop reason: HOSPADM

## 2025-07-15 RX ORDER — CALCIUM CARBONATE 500 MG/1
1 TABLET, CHEWABLE ORAL 3 TIMES DAILY PRN
Status: DISCONTINUED | OUTPATIENT
Start: 2025-07-15 | End: 2025-07-19 | Stop reason: HOSPADM

## 2025-07-15 RX ORDER — MORPHINE SULFATE 2 MG/ML
1 INJECTION, SOLUTION INTRAMUSCULAR; INTRAVENOUS EVERY 4 HOURS PRN
Status: DISCONTINUED | OUTPATIENT
Start: 2025-07-15 | End: 2025-07-15

## 2025-07-15 RX ORDER — HEPARIN SODIUM 5000 [USP'U]/ML
5000 INJECTION, SOLUTION INTRAVENOUS; SUBCUTANEOUS EVERY 8 HOURS SCHEDULED
Status: DISCONTINUED | OUTPATIENT
Start: 2025-07-15 | End: 2025-07-15

## 2025-07-15 RX ORDER — FLUTICASONE PROPIONATE 50 MCG
2 SPRAY, SUSPENSION (ML) NASAL DAILY
Status: DISCONTINUED | OUTPATIENT
Start: 2025-07-15 | End: 2025-07-19 | Stop reason: HOSPADM

## 2025-07-15 RX ORDER — FAMOTIDINE 10 MG/ML
20 INJECTION, SOLUTION INTRAVENOUS ONCE
Status: DISCONTINUED | OUTPATIENT
Start: 2025-07-15 | End: 2025-07-15 | Stop reason: HOSPADM

## 2025-07-15 RX ORDER — TRANEXAMIC ACID 10 MG/ML
1000 INJECTION, SOLUTION INTRAVENOUS ONCE
Status: DISCONTINUED | OUTPATIENT
Start: 2025-07-15 | End: 2025-07-15 | Stop reason: HOSPADM

## 2025-07-15 RX ORDER — ACETAMINOPHEN 325 MG/1
650 TABLET ORAL EVERY 4 HOURS PRN
Status: DISCONTINUED | OUTPATIENT
Start: 2025-07-15 | End: 2025-07-19 | Stop reason: HOSPADM

## 2025-07-15 RX ORDER — BUPIVACAINE HCL/0.9 % NACL/PF 0.125 %
PLASTIC BAG, INJECTION (ML) EPIDURAL AS NEEDED
Status: DISCONTINUED | OUTPATIENT
Start: 2025-07-15 | End: 2025-07-15 | Stop reason: SURG

## 2025-07-15 RX ORDER — ONDANSETRON 2 MG/ML
4 INJECTION INTRAMUSCULAR; INTRAVENOUS EVERY 6 HOURS PRN
Status: DISCONTINUED | OUTPATIENT
Start: 2025-07-15 | End: 2025-07-15

## 2025-07-15 RX ORDER — DOCUSATE SODIUM 100 MG/1
100 CAPSULE, LIQUID FILLED ORAL 2 TIMES DAILY
Status: DISCONTINUED | OUTPATIENT
Start: 2025-07-15 | End: 2025-07-19 | Stop reason: HOSPADM

## 2025-07-15 RX ORDER — PROPOFOL 10 MG/ML
VIAL (ML) INTRAVENOUS AS NEEDED
Status: DISCONTINUED | OUTPATIENT
Start: 2025-07-15 | End: 2025-07-15 | Stop reason: SURG

## 2025-07-15 RX ORDER — ONDANSETRON 2 MG/ML
4 INJECTION INTRAMUSCULAR; INTRAVENOUS ONCE AS NEEDED
Status: DISCONTINUED | OUTPATIENT
Start: 2025-07-15 | End: 2025-07-15 | Stop reason: HOSPADM

## 2025-07-15 RX ADMIN — Medication 200 MCG: at 17:37

## 2025-07-15 RX ADMIN — Medication 100 MCG: at 17:00

## 2025-07-15 RX ADMIN — SODIUM CHLORIDE 80 ML/HR: 9 INJECTION, SOLUTION INTRAVENOUS at 13:31

## 2025-07-15 RX ADMIN — Medication 100 MCG: at 17:55

## 2025-07-15 RX ADMIN — FENTANYL CITRATE 25 MCG: 50 INJECTION, SOLUTION INTRAMUSCULAR; INTRAVENOUS at 16:31

## 2025-07-15 RX ADMIN — Medication 100 MCG: at 17:28

## 2025-07-15 RX ADMIN — ESMOLOL HYDROCHLORIDE 10 MG: 10 INJECTION, SOLUTION INTRAVENOUS at 17:51

## 2025-07-15 RX ADMIN — PRIMIDONE 50 MG: 50 TABLET ORAL at 21:40

## 2025-07-15 RX ADMIN — POTASSIUM CHLORIDE AND SODIUM CHLORIDE 50 ML/HR: 900; 150 INJECTION, SOLUTION INTRAVENOUS at 21:39

## 2025-07-15 RX ADMIN — ROPIVACAINE HYDROCHLORIDE 20 ML: 5 INJECTION, SOLUTION EPIDURAL; INFILTRATION; PERINEURAL at 13:35

## 2025-07-15 RX ADMIN — ROCURONIUM BROMIDE 50 MG: 10 INJECTION INTRAVENOUS at 15:44

## 2025-07-15 RX ADMIN — SUGAMMADEX 200 MG: 100 INJECTION, SOLUTION INTRAVENOUS at 17:51

## 2025-07-15 RX ADMIN — ESMOLOL HYDROCHLORIDE 10 MG: 10 INJECTION, SOLUTION INTRAVENOUS at 18:12

## 2025-07-15 RX ADMIN — DOCUSATE SODIUM 100 MG: 100 CAPSULE, LIQUID FILLED ORAL at 21:40

## 2025-07-15 RX ADMIN — Medication 100 MCG: at 18:27

## 2025-07-15 RX ADMIN — FENTANYL CITRATE 25 MCG: 50 INJECTION, SOLUTION INTRAMUSCULAR; INTRAVENOUS at 15:44

## 2025-07-15 RX ADMIN — DEXMEDETOMIDINE HYDROCHLORIDE 4 MCG: 100 INJECTION, SOLUTION INTRAVENOUS at 17:55

## 2025-07-15 RX ADMIN — LIDOCAINE HYDROCHLORIDE 50 MG: 10 INJECTION, SOLUTION EPIDURAL; INFILTRATION; INTRACAUDAL; PERINEURAL at 15:44

## 2025-07-15 RX ADMIN — CITALOPRAM HYDROBROMIDE 20 MG: 20 TABLET ORAL at 12:16

## 2025-07-15 RX ADMIN — Medication 200 MCG: at 18:09

## 2025-07-15 RX ADMIN — SODIUM CHLORIDE 2 G: 900 INJECTION INTRAVENOUS at 15:50

## 2025-07-15 RX ADMIN — ACETAMINOPHEN 650 MG: 325 TABLET ORAL at 23:01

## 2025-07-15 RX ADMIN — FENTANYL CITRATE 25 MCG: 50 INJECTION, SOLUTION INTRAMUSCULAR; INTRAVENOUS at 17:12

## 2025-07-15 RX ADMIN — DOCUSATE SODIUM 100 MG: 100 CAPSULE, LIQUID FILLED ORAL at 12:16

## 2025-07-15 RX ADMIN — ROCURONIUM BROMIDE 20 MG: 10 INJECTION INTRAVENOUS at 17:13

## 2025-07-15 RX ADMIN — ALBUMIN (HUMAN) 500 ML: 12.5 INJECTION, SOLUTION INTRAVENOUS at 18:47

## 2025-07-15 RX ADMIN — FLUTICASONE PROPIONATE 2 SPRAY: 50 SPRAY, METERED NASAL at 12:16

## 2025-07-15 RX ADMIN — Medication 100 MCG: at 16:50

## 2025-07-15 RX ADMIN — Medication 100 MCG: at 16:49

## 2025-07-15 RX ADMIN — Medication 100 MCG: at 17:57

## 2025-07-15 RX ADMIN — TRANEXAMIC ACID 1000 MG: 10 INJECTION, SOLUTION INTRAVENOUS at 16:00

## 2025-07-15 RX ADMIN — SODIUM CHLORIDE, POTASSIUM CHLORIDE, SODIUM LACTATE AND CALCIUM CHLORIDE: 600; 310; 30; 20 INJECTION, SOLUTION INTRAVENOUS at 15:40

## 2025-07-15 RX ADMIN — TETANUS TOXOID, REDUCED DIPHTHERIA TOXOID AND ACELLULAR PERTUSSIS VACCINE, ADSORBED 0.5 ML: 5; 2.5; 8; 8; 2.5 SUSPENSION INTRAMUSCULAR at 02:29

## 2025-07-15 RX ADMIN — Medication 3 ML: at 21:42

## 2025-07-15 RX ADMIN — Medication 1000 MG: at 18:33

## 2025-07-15 RX ADMIN — FENTANYL CITRATE 25 MCG: 50 INJECTION, SOLUTION INTRAMUSCULAR; INTRAVENOUS at 17:14

## 2025-07-15 RX ADMIN — Medication 100 MCG: at 18:03

## 2025-07-15 RX ADMIN — SODIUM CHLORIDE 2 G: 900 INJECTION INTRAVENOUS at 23:59

## 2025-07-15 RX ADMIN — Medication 10 ML: at 12:17

## 2025-07-15 RX ADMIN — LIDOCAINE HYDROCHLORIDE,EPINEPHRINE BITARTRATE 10 ML: 10; .01 INJECTION, SOLUTION INFILTRATION; PERINEURAL at 04:10

## 2025-07-15 RX ADMIN — PROPOFOL 100 MG: 10 INJECTION, EMULSION INTRAVENOUS at 15:44

## 2025-07-15 RX ADMIN — Medication 200 MCG: at 16:05

## 2025-07-15 NOTE — CASE MANAGEMENT/SOCIAL WORK
Discharge Planning Assessment  [facility]     Patient Name: Chula Chen  MRN: [id number]  Today's Date: 7/15/2025    Admit Date: 7/15/2025    Plan: SNf vs home with HH   Discharge Needs Assessment       Row Name 07/15/25 1309       Living Environment    People in Home facility resident    Current Living Arrangements extended care facility    Potentially Unsafe Housing Conditions none    In the past 12 months has the electric, gas, oil, or water company threatened to shut off services in your home? No    Primary Care Provided by other (see comments)    Provides Primary Care For no one    Family Caregiver if Needed child(nanda), adult    Quality of Family Relationships involved;helpful    Able to Return to Prior Arrangements yes       Resource/Environmental Concerns    Resource/Environmental Concerns none    Transportation Concerns none       Transportation Needs    In the past 12 months, has lack of transportation kept you from medical appointments or from getting medications? no    In the past 12 months, has lack of transportation kept you from meetings, work, or from getting things needed for daily living? No       Food Insecurity    Within the past 12 months, you worried that your food would run out before you got the money to buy more. Never true    Within the past 12 months, the food you bought just didn't last and you didn't have money to get more. Never true       Transition Planning    Patient/Family Anticipates Transition to other (see comments)    Transportation Anticipated family or friend will provide;other (see comments)       Discharge Needs Assessment    Readmission Within the Last 30 Days no previous admission in last 30 days    Equipment Currently Used at Home wheelchair;walker, rolling    Concerns to be Addressed discharge planning    Do you want help finding or keeping work or a job? I do not need or want help    Do you want help with school or training? For example, starting or completing  job training or getting a high school diploma, GED or equivalent No    Anticipated Changes Related to Illness none    Equipment Needed After Discharge none                   Discharge Plan       Row Name 07/15/25 1309       Plan    Plan SNf vs home with HH    Plan Comments Pt is a resident of [facility] in a private pay long term care bd. CM has spokenw yaz Montesinos with the [facility] who reports there is a bed hold. Pt was using a wheelchair or walker for mobility prior to admit. She is followed by her PCP and has drug coverage. Plan for surgery later today. Once pt is able to participate with therapy CM will update Yamel with [facility] about if pt will need SNF or HH at [place]. CM to follow      Row Name 07/15/25 1303       Plan    Plan SNF    Plan Comments Pt is a resident of [facility] is private pay long term care bed. CM has spoken with Yamel with the [facility] who reports there is a bed hold. CM has left a VM with pts daughter to discuss discharge needs. Cm to follow                  Continued Care and Services - Admitted Since 7/15/2025    No active coordination exists.          Demographic Summary       Row Name 07/15/25 1307       General Information    Referral Source physician    Reason for Consult discharge planning    Preferred Language English       Contact Information    Permission Granted to Share Info With family/designee    Contact Information Comments Deana Gustavo [phone]                   Functional Status       Row Name 07/15/25 1308       Functional Status    Usual Activity Tolerance moderate    Current Activity Tolerance moderate       Physical Activity    On average, how many days per week do you engage in moderate to strenuous exercise (like a brisk walk)? 0 days    On average, how many minutes do you engage in exercise at this level? 0 min    Number of minutes of exercise per week 0       Functional Status, IADL    Medications assistive person    Meal Preparation assistive person     Housekeeping assistive person    Laundry assistive person    Shopping assistive person    If for any reason you need help with day-to-day activities such as bathing, preparing meals, shopping, managing finances, etc., do you get the help you need? I get all the help I need    IADL Comments pt is a long term care resident of [facility]       Mental Status    General Appearance WDL WDL       Employment/    Employment Status retired                   Psychosocial    No documentation.                  Abuse/Neglect    No documentation.                  Legal    No documentation.                  Substance Abuse    No documentation.                  Patient Forms    No documentation.                     Kathy Santoyo RN

## 2025-07-15 NOTE — ED NOTES
Chula Chen    Nursing Report ED to Floor:  Mental status: a&ox4 GCS 15  Ambulatory status: uses walker at BL  Oxygen Therapy:  2 L NC (not BL)  Cardiac Rhythm: NSR/sinus tachy  [facility] [place] [facility]  Safety Concerns:  fall risks/hip fx  Precautions: n/a  Social Issues: n/a  ED Room #:  11    ED Nurse Phone Extension - [phone] or may call [phone].      HPI:   Chief Complaint   Patient presents with    Fall       Past Medical History:  Past Medical History:   Diagnosis Date    Cystocele, midline     Hemorrhoids         Past Surgical History:  Past Surgical History:   Procedure Laterality Date    BREAST BIOPSY      cyst removed    CATARACT EXTRACTION Bilateral 2005    THYROID SURGERY      nodule removal, Benign        Admitting Doctor:   No admitting provider for patient encounter.    Consulting Provider(s):  Consults       No orders found from 6/16/2025 to 7/16/2025.             Admitting Diagnosis:   The primary encounter diagnosis was Closed fracture of neck of right femur, initial encounter. A diagnosis of Laceration without foreign body of scalp, initial encounter was also pertinent to this visit.    Most Recent Vitals:   Vitals:    07/15/25 0430 07/15/25 0500 07/15/25 0530 07/15/25 0600   BP: 147/71 136/74 133/72 143/74   BP Location:       Patient Position:       Pulse: 102 100 103 106   Resp:       Temp:       TempSrc:       SpO2: 93% 94% 94% 93%   Weight:       Height:           Active LDAs/IV Access:   Lines, Drains & Airways       Active LDAs       Name Placement date Placement time Site Days    Peripheral IV 07/15/25 0330 20 G Right Antecubital 07/15/25  0330  Antecubital  less than 1                    Labs (abnormal labs have a star):   Labs Reviewed   COMPREHENSIVE METABOLIC PANEL - Abnormal; Notable for the following components:       Result Value    Glucose 125 (*)     Creatinine 0.49 (*)     Sodium 134 (*)     Albumin 3.3 (*)     ALT (SGPT) 87 (*)     AST (SGOT) 36 (*)      Alkaline Phosphatase 152 (*)     BUN/Creatinine Ratio 37.8 (*)     All other components within normal limits    Narrative:     GFR Categories in Chronic Kidney Disease (CKD)              GFR Category          GFR (mL/min/1.73)    Interpretation  G1                    90 or greater        Normal or high (1)  G2                    60-89                Mild decrease (1)  G3a                   45-59                Mild to moderate decrease  G3b                   30-44                Moderate to severe decrease  G4                    15-29                Severe decrease  G5                    14 or less           Kidney failure    (1)In the absence of evidence of kidney disease, neither GFR category G1 or G2 fulfill the criteria for CKD.    eGFR calculation 2021 CKD-EPI creatinine equation, which does not include race as a factor   CBC WITH AUTO DIFFERENTIAL - Abnormal; Notable for the following components:    WBC 11.97 (*)     Neutrophils, Absolute 8.06 (*)     Monocytes, Absolute 0.93 (*)     All other components within normal limits   CBC AND DIFFERENTIAL    Narrative:     The following orders were created for panel order CBC & Differential.  Procedure                               Abnormality         Status                     ---------                               -----------         ------                     CBC Auto Differential[[id number]]        Abnormal            Final result                 Please view results for these tests on the individual orders.       Meds Given in ED:   Medications   Tetanus-Diphth-Acell Pertussis (BOOSTRIX) injection 0.5 mL (0.5 mL Intramuscular Given 7/15/25 3329)   lidocaine 1% - EPINEPHrine 1:170598 (XYLOCAINE W/EPI) 1 %-1:561836 injection 10 mL (10 mL Injection Given by Other 7/15/25 1827)     No current facility-administered medications for this encounter.       Last NIH score:                                                          Dysphagia screening results:        Shira  Coma Scale:  No data recorded     CIWA:        Restraint Type:            Isolation Status:  No active isolations

## 2025-07-15 NOTE — [facility] PROVIDER NOTE
"Subjective  History of Present Illness:    Pt presents to the emergency department after a fall approximately 45 minutes prior to arrival.  Pt sustained a laceration to her right scalp.  Pt reports right scalp pain.  Pt denies LOC.  Pt reports right leg pain but this may be the result of \"rheumatism\"      Nurses Notes reviewed and agree, including vitals, allergies, social history and prior medical history.     REVIEW OF SYSTEMS:   Review of Systems   Skin:  Positive for wound.   Neurological:  Positive for headaches.       Past Medical History:   Diagnosis Date    Cystocele, midline     Hemorrhoids        Allergies:    Patient has no known allergies.      Past Surgical History:   Procedure Laterality Date    BREAST BIOPSY      cyst removed    CATARACT EXTRACTION Bilateral 2005    THYROID SURGERY      nodule removal, Benign         Social History     Socioeconomic History    Marital status:    Tobacco Use    Smoking status: Former    Smokeless tobacco: Never   Vaping Use    Vaping status: Never Used   Substance and Sexual Activity    Alcohol use: Never    Drug use: Never         Family History   Problem Relation Age of Onset    Diabetes Mother     Heart attack Father     Diabetes Sister     Breast cancer Daughter        Objective  Physical Exam:  BP 91/43 (BP Location: Right arm, Patient Position: Lying)   Pulse 102   Temp 97.5 °F (36.4 °C) (Oral)   Resp 16   Ht 149.9 cm (59\")   Wt 48.5 kg (107 lb)   SpO2 94%   BMI 21.61 kg/m²      Physical Exam  Vitals and nursing note reviewed.   Constitutional:       Appearance: Normal appearance.   HENT:      Head:        Right Ear: External ear normal.      Left Ear: External ear normal.      Nose: Nose normal.      Mouth/Throat:      Mouth: Mucous membranes are moist.   Eyes:      Extraocular Movements: Extraocular movements intact.   Cardiovascular:      Rate and Rhythm: Normal rate and regular rhythm.   Pulmonary:      Effort: Pulmonary effort is normal.      " Breath sounds: Normal breath sounds.   Musculoskeletal:         General: Tenderness present.      Comments: Pain with range of motion of the right hip   Skin:     General: Skin is warm and dry.      Findings: Laceration present.   Neurological:      General: No focal deficit present.      Mental Status: She is alert.   Psychiatric:         Mood and Affect: Mood normal.         Behavior: Behavior normal.         Thought Content: Thought content normal.         Laceration Repair    Date/Time: 7/15/2025 11:58 PM    Performed by: Rony Campos MD  Authorized by: Willie Acuna MD    Consent:     Consent obtained:  Verbal    Consent given by:  Patient  Universal protocol:     Patient identity confirmed:  Verbally with patient  Anesthesia:     Anesthesia method:  Local infiltration    Local anesthetic:  Lidocaine 1% w/o epi  Laceration details:     Location:  Scalp    Scalp location:  Frontal    Length (cm):  4  Pre-procedure details:     Preparation:  Patient was prepped and draped in usual sterile fashion  Exploration:     Limited defect created (wound extended): no    Treatment:     Scar revision: no    Skin repair:     Repair method:  Sutures    Suture size:  4-0    Suture material:  Nylon    Suture technique:  Simple interrupted    Number of sutures:  4  Repair type:     Repair type:  Simple      ED Course:    ED Course as of 07/16/25 0000   Tue Jul 15, 2025   0320 Normal sinus rhythm at 61, normal axis, no ST/T wave change [MERRY]   0529 Case d/w Dr. Ortiz who states he opal see the patient in consultation [MERRY]      ED Course User Index  [MERRY] Rony Campos MD       Lab Results (last 24 hours)       Procedure Component Value Units Date/Time    CBC & Differential [[id number]]  (Abnormal) Collected: 07/15/25 0248    Specimen: Blood Updated: 07/15/25 0252    Narrative:      The following orders were created for panel order CBC & Differential.  Procedure                               Abnormality         Status                      ---------                               -----------         ------                     CBC Auto Differential[[id number]]        Abnormal            Final result                 Please view results for these tests on the individual orders.    Comprehensive Metabolic Panel [[id number]]  (Abnormal) Collected: 07/15/25 0248    Specimen: Blood Updated: 07/15/25 0309     Glucose 125 mg/dL      BUN 18.5 mg/dL      Creatinine 0.49 mg/dL      Sodium 134 mmol/L      Potassium 3.8 mmol/L      Chloride 100 mmol/L      CO2 22.0 mmol/L      Calcium 9.2 mg/dL      Total Protein 6.9 g/dL      Albumin 3.3 g/dL      ALT (SGPT) 87 U/L      AST (SGOT) 36 U/L      Alkaline Phosphatase 152 U/L      Total Bilirubin 0.2 mg/dL      Globulin 3.6 gm/dL      A/G Ratio 0.9 g/dL      BUN/Creatinine Ratio 37.8     Anion Gap 12.0 mmol/L      eGFR 87.5 mL/min/1.73     Narrative:      GFR Categories in Chronic Kidney Disease (CKD)              GFR Category          GFR (mL/min/1.73)    Interpretation  G1                    90 or greater        Normal or high (1)  G2                    60-89                Mild decrease (1)  G3a                   45-59                Mild to moderate decrease  G3b                   30-44                Moderate to severe decrease  G4                    15-29                Severe decrease  G5                    14 or less           Kidney failure    (1)In the absence of evidence of kidney disease, neither GFR category G1 or G2 fulfill the criteria for CKD.    eGFR calculation 2021 CKD-EPI creatinine equation, which does not include race as a factor    CBC Auto Differential [[id number]]  (Abnormal) Collected: 07/15/25 0248    Specimen: Blood Updated: 07/15/25 0252     WBC 11.97 10*3/mm3      RBC 4.82 10*6/mm3      Hemoglobin 13.3 g/dL      Hematocrit 40.2 %      MCV 83.4 fL      MCH 27.6 pg      MCHC 33.1 g/dL      RDW 14.2 %      RDW-SD 43.5 fl      MPV 8.9 fL      Platelets 432 10*3/mm3      Neutrophil %  67.2 %      Lymphocyte % 22.5 %      Monocyte % 7.8 %      Eosinophil % 1.8 %      Basophil % 0.3 %      Immature Grans % 0.4 %      Neutrophils, Absolute 8.06 10*3/mm3      Lymphocytes, Absolute 2.69 10*3/mm3      Monocytes, Absolute 0.93 10*3/mm3      Eosinophils, Absolute 0.21 10*3/mm3      Basophils, Absolute 0.03 10*3/mm3      Immature Grans, Absolute 0.05 10*3/mm3     Urinalysis With Culture If Indicated - Straight Cath [[id number]]  (Abnormal) Collected: 07/15/25 0958    Specimen: Urine from Straight Cath Updated: 07/15/25 1032     Color, UA Yellow     Appearance, UA Turbid     pH, UA 6.0     Specific Gravity, UA 1.017     Glucose, UA Negative     Ketones, UA 15 mg/dL (1+)     Bilirubin, UA Negative     Blood, UA Moderate (2+)     Protein, UA 30 mg/dL (1+)     Leuk Esterase, UA Large (3+)     Nitrite, UA Negative     Urobilinogen, UA 1.0 E.U./dL    Narrative:      In absence of clinical symptoms, the presence of pyuria, bacteria, and/or nitrites on the urinalysis result does not correlate with infection.    Urinalysis, Microscopic Only - Straight Cath [[id number]]  (Abnormal) Collected: 07/15/25 0958    Specimen: Urine from Straight Cath Updated: 07/15/25 1100     RBC, UA 6-10 /HPF      WBC, UA Too Numerous to Count /HPF      Bacteria, UA 4+ /HPF      Squamous Epithelial Cells, UA 0-2 /HPF      Hyaline Casts, UA 3-6 /LPF      Methodology Manual Light Microscopy    Urine Culture - Urine, Straight Cath [[id number]] Collected: 07/15/25 0958    Specimen: Urine from Straight Cath Updated: 07/15/25 1100    Magnesium [[id number]]  (Normal) Collected: 07/15/25 1105    Specimen: Blood Updated: 07/15/25 1315     Magnesium 2.0 mg/dL     Phosphorus [[id number]]  (Normal) Collected: 07/15/25 1105    Specimen: Blood Updated: 07/15/25 1315     Phosphorus 2.5 mg/dL              XR Hip With or Without Pelvis 2 - 3 View Right  Result Date: 7/15/2025  XR HIP W OR WO PELVIS 2-3 VIEW RIGHT Date of Exam: 7/15/2025 6:50 PM EDT  Indication: Post-Op Hip Arthroplasty Comparison: Hip and pelvis radiographs 7/15/2025 Findings: Interval right hip arthroplasty without radiographic evidence of immediate complication. Surrounding soft tissue edema and subcutaneous emphysema, favored postoperative. Degenerative changes of the sacroiliac joints, left hip, and pubic symphysis appear unchanged.     Impression: Impression: Interval right hip arthroplasty without radiographic evidence of immediate complication. Electronically Signed: Andrez Martinez MD  7/15/2025 7:12 PM EDT  Workstation ID: [id number]    RIGHt FI cath  Result Date: 7/15/2025  Oren Wiggins CRNA     7/15/2025  1:22 PM RIGHt FI cath Patient reassessed immediately prior to procedure Reason for block: at surgeon's request and post-op pain management Performed by CRNA/CAA: Oren Wiggins CRNA Assisted by: Genevieve Floyd RN Preanesthetic Checklist Completed: patient identified, IV checked, site marked, risks and benefits discussed, surgical consent, monitors and equipment checked, pre-op evaluation and timeout performed Prep: Pt Position: supine Sterile barriers:cap, gloves, mask and washed/disinfected hands Prep: ChloraPrep Patient monitoring: blood pressure monitoring, continuous pulse oximetry and EKG Procedure Sedation: no Performed under: local infiltration Guidance:ultrasound guided ULTRASOUND INTERPRETATION.  Using ultrasound guidance a 20 G gauge needle was placed in close proximity to the nerve, at which point, under ultrasound guidance anesthetic was injected in the area of the nerve and spread of the anesthesia was seen on ultrasound in close proximity thereto.  There were no abnormalities seen on ultrasound; a digital image was taken; and the patient tolerated the procedure with no complications. Images:still images obtained, printed/placed on chart Laterality:right Block Type:fascia iliaca compartment Injection Technique:catheter Needle Type:echogenic and Tuohy Needle Gauge:18 G  "Resistance on Injection: none Catheter Size:20 G (20g) Cath Depth at skin: 11 cm Post Assessment Injection Assessment: negative aspiration for heme, no paresthesia on injection and incremental injection Patient Tolerance:comfortable throughout block Complications:no Additional Notes CKAFASCIAILIACA: CATHETER A high-frequency linear transducer, with sterile cover, was placed in parasagittal plane on top of the Anterior Superior Iliac Spine (ASIS) and moved medially to identify the Internal Oblique muscle, Sartorius muscle, Iliacus Muscle, Fascia Iliaca (FI) and Fascia Latae. The insertion site was prepped and draped in sterile fashion. Skin and cutaneous tissue was infiltrated with 2-5 ml of 1% Lidocaine. Using ultrasound-guidance, an 18-gauge Contiplex Ultra 360 Touhy needle was advanced in plane from caudad to cephalad. Preservative-free normal saline was utilized for hydro-dissection of tissue, advancement of Touhy, and to confirm final needle placement below FI. Local anesthetic in incremental 3-5 ml injections. Aspiration every 5 ml to prevent intravascular injection. Injection was completed with negative aspiration of blood and negative intravascular injection. Injection pressures were normal with minimal resistance. A 20-gauge Contiplex Echo catheter was placed through the needle and advance out the tip of the Touhy 3-5 cm. The Touhy needle was then removed, and final catheter position verified below the FI. The catheter was secured in the usual fashion with skin glue, benzoin, steri-strips, CHG tegaderm and Label noting \"Nerve Block Catheter\". Jerk tape applied at yellow connector and catheter connection. Performed by: Oren Wiggins CRNA     CT Head Without Contrast  Result Date: 7/15/2025  CT HEAD WO CONTRAST Date of Exam: 7/15/2025 1:55 AM EDT Indication: head injury. Comparison: None available. Technique: Axial CT images were obtained of the head without contrast administration.  Automated exposure control " and iterative construction methods were used. Findings: There is mild diffuse generalized atrophy. There are low-attenuation areas in the periventricular white matter consistent with chronic microvascular ischemic change. There is no mass, mass effect or midline shift. There are no abnormal extra-axial fluid collections or areas of acute hemorrhage. There is mild left maxillary sinus disease. The mastoid air cells are clear. There is a skin laceration in the right lateral frontal region with no radiopaque foreign body or underlying bony abnormality.     Impression: Impression: Atrophy and chronic microvascular ischemic change. No acute intracranial process. Electronically Signed: Dav Reed MD  7/15/2025 2:41 AM EDT  Workstation ID: [id number]    XR Hip With or Without Pelvis 2 - 3 View Right  Result Date: 7/15/2025  XR HIP W OR WO PELVIS 2-3 VIEW RIGHT Date of Exam: 7/15/2025 1:40 AM EDT Indication: hip injury Comparison: None available. Findings: There is an abnormal appearance at the subcapital portion of the right femur suspected to represent a fracture. There is bilateral hip arthritic change. There are no lytic or destructive bony lesions.     Impression: Impression: Suspected subcapital right femoral neck fracture. Electronically Signed: Dav Reed MD  7/15/2025 2:23 AM EDT  Workstation ID: [id number]         MDM     Amount and/or Complexity of Data Reviewed  Clinical lab tests: reviewed  Tests in the radiology section of CPT®: reviewed  Tests in the medicine section of CPT®: reviewed        Initial impression of presenting illness: Scalp laceration    DDX: includes but is not limited to: Subdural hematoma, epidural hematoma, hip dislocation    Patient arrives by EMS with vitals interpreted by myself.     Pertinent features from physical exam: Pain with range of motion of the right hip.    Initial diagnostic plan: Labs and imaging    Results from initial plan were reviewed and interpreted by me revealing  consistent with femoral neck fracture    Diagnostic information from other sources: Patient's outside records including prior discharge summary for COVID-19        Medications   sodium chloride 0.9 % flush 10 mL (10 mL Intravenous Not Given 7/15/25 2141)   sodium chloride 0.9 % flush 10 mL ( Intravenous MAR Unhold 7/15/25 1948)   sodium chloride 0.9 % infusion 40 mL ( Intravenous MAR Unhold 7/15/25 1948)   sodium chloride 0.9 % infusion (80 mL/hr Intravenous New Bag 7/15/25 1331)   Potassium Replacement - Follow Nurse / BPA Driven Protocol (has no administration in time range)   Magnesium Standard Dose Replacement - Follow Nurse / BPA Driven Protocol ( Not Applicable MAR Unhold 7/15/25 1948)   Phosphorus Replacement - Follow Nurse / BPA Driven Protocol ( Not Applicable MAR Unhold 7/15/25 1948)   Calcium Replacement - Follow Nurse / BPA Driven Protocol ( Not Applicable MAR Unhold 7/15/25 1948)   acetaminophen (TYLENOL) tablet 650 mg (650 mg Oral Given 7/15/25 2301)     Or   acetaminophen (TYLENOL) 160 MG/5ML oral solution 650 mg ( Oral Not Given:  See Alt 7/15/25 2301)     Or   acetaminophen (TYLENOL) suppository 650 mg ( Rectal Not Given:  See Alt 7/15/25 2301)   calcium carbonate (TUMS) chewable tablet 500 mg (200 mg elemental) ( Oral MAR Unhold 7/15/25 1948)   citalopram (CeleXA) tablet 20 mg (20 mg Oral Given 7/15/25 1216)   clonazePAM (KlonoPIN) tablet 0.5 mg (has no administration in time range)   docusate sodium (COLACE) capsule 100 mg (100 mg Oral Given 7/15/25 2140)   fluticasone (FLONASE) 50 MCG/ACT nasal spray 2 spray (2 sprays Nasal Given 7/15/25 1216)   primidone (MYSOLINE) tablet 50 mg (50 mg Oral Given 7/15/25 2140)   dorzolamide (TRUSOPT) 2 % ophthalmic solution 1 drop ( Both Eyes MAR Unhold 7/15/25 1948)   timolol (TIMOPTIC) 0.5 % ophthalmic solution 1 drop ( Both Eyes MAR Unhold 7/15/25 1948)   lactated ringers infusion (9 mL/hr Intravenous Not Given 7/15/25 2335)   ropivacaine (NAROPIN) 0.2 %  infusion (INFUSYSTEM) (1,000 mg Peripheral Nerve New Bag 7/15/25 1833)   lactated ringers infusion (9 mL/hr Intravenous Not Given 7/15/25 2336)   sodium chloride 0.9 % flush 3 mL (3 mL Intravenous Given 7/15/25 2142)   sodium chloride 0.9 % flush 3-10 mL (has no administration in time range)   sodium chloride 0.9 % infusion 40 mL (has no administration in time range)   sodium chloride 0.9 % with KCl 20 mEq/L infusion (50 mL/hr Intravenous Not Given 7/15/25 2140)   morphine injection 1 mg (has no administration in time range)     And   naloxone (NARCAN) injection 0.4 mg (has no administration in time range)   ceFAZolin 2000 mg IVPB in 100 mL NS (MBP) (2 g Intravenous New Bag 7/15/25 0926)   ondansetron ODT (ZOFRAN-ODT) disintegrating tablet 4 mg (has no administration in time range)     Or   ondansetron (ZOFRAN) injection 4 mg (has no administration in time range)   docusate sodium (COLACE) capsule 100 mg (has no administration in time range)   enoxaparin sodium (LOVENOX) syringe 30 mg (has no administration in time range)   traMADol (ULTRAM) tablet 50 mg (has no administration in time range)   Tetanus-Diphth-Acell Pertussis (BOOSTRIX) injection 0.5 mL (0.5 mL Intramuscular Given 7/15/25 0229)   lidocaine 1% - EPINEPHrine 1:748128 (XYLOCAINE W/EPI) 1 %-1:607186 injection 10 mL (10 mL Injection Given by Other 7/15/25 0410)   tranexamic acid 1000 mg in 100 mL 0.7% NaCl infusion (premix) (1,000 mg Intravenous New Bag 7/15/25 1600)   ceFAZolin 2000 mg IVPB in 100 mL NS (MBP) (2 g Intravenous New Bag 7/15/25 1550)   albumin human 5 % solution 500 mL (500 mL Intravenous New Bag 7/15/25 4867)       Results/clinical rationale were discussed with patient        Data interpreted: Nursing notes reviewed, vital signs reviewed.  CBC with differential and CMP Images independantly reviewed and Plain extremity XR's reviewed independently interpreted by me.  EKG independently interpreted by me.  O2 saturation:    Care significantly  affected by Social Determinants of Health (housing and economic circumstances, unemployment)               [] Yes     [x] No              If yes, Patient's care significantly limited by  Social Determinants of Health including:              [] Inadequate housing              [] Low income              [] Alcoholism and drug addiction in family              [] Problems related to primary support group              [] Unemployment              [] Problems related to employment              [] Other Social Determinants of Health:     Counseling: Discussed the results above with the patient regarding need for discharged home. Return precautions were given to patient.  Patient understands and agrees plan of care.      The patient was given anticipatory guidance and return precautions and advised of the need for urgent follow up.     -----  ED Disposition       ED Disposition   Decision to Admit    Condition   --    Comment   Level of Care: Med/Surg [1]   Accepting Provider:: MEREDITH FLOYD [[id number]]   Patient Class: Inpatient [101]               Final diagnoses:   Closed fracture of neck of right femur, initial encounter   Laceration without foreign body of scalp, initial encounter     Your Follow-Up Providers    Follow-up information has not been specified.       Contact information for after-discharge care    Follow-up information has not been specified.          Your medication list        CONTINUE taking these medications        Instructions Last Dose Given Next Dose Due   acetaminophen 325 MG tablet  Commonly known as: TYLENOL      Take 1 tablet by mouth Every 6 (Six) Hours As Needed for Mild Pain or Fever.       benzonatate 100 MG capsule  Commonly known as: TESSALON      Take 1 capsule by mouth 3 (Three) Times a Day As Needed for Cough.       bimatoprost 0.01 % ophthalmic drops  Commonly known as: LUMIGAN      Administer 1 drop to both eyes Every Night.       calcium carbonate 500 MG chewable tablet  Commonly known  as: TUMS      Chew 1 tablet 3 (Three) Times a Day As Needed for Heartburn. OTC       cholecalciferol 25 MCG (1000 UT) tablet  Commonly known as: VITAMIN D3      Take 1 tablet by mouth Daily. OTC       citalopram 40 MG tablet  Commonly known as: CeleXA      Take 0.5 tablets by mouth Daily.       clonazePAM 0.5 MG tablet  Commonly known as: KlonoPIN      Take 1 tablet by mouth At Night As Needed for Anxiety.       docusate sodium 100 MG capsule  Commonly known as: COLACE      Take 1 capsule by mouth 2 (Two) Times a Day. OTC       dorzolamide-timolol 2-0.5 % ophthalmic solution  Commonly known as: COSOPT      Administer 1 drop to both eyes 2 (Two) Times a Day.       fluticasone 50 MCG/ACT nasal spray  Commonly known as: FLONASE      2 sprays into the nostril(s) as directed by provider Daily. OTC       guaiFENesin 600 MG 12 hr tablet  Commonly known as: MUCINEX      Take 1 tablet by mouth 2 (Two) Times a Day. OTC       Loratadine 10 MG capsule      Take 1 capsule by mouth Daily. OTC       Multivitamin Adults tablet tablet  Generic drug: multivitamin with minerals      Take 1 tablet by mouth Daily. OTC       olopatadine 0.2 % solution ophthalmic solution  Commonly known as: PATADAY      Administer 1 drop to the right eye Daily.       ondansetron ODT 4 MG disintegrating tablet  Commonly known as: ZOFRAN-ODT      Place 1 tablet on the tongue 4 (Four) Times a Day As Needed for Nausea or Vomiting.       primidone 50 MG tablet  Commonly known as: MYSOLINE      Take 1 tablet by mouth 3 (Three) Times a Day.       traZODone 50 MG tablet  Commonly known as: DESYREL      Take 1 tablet by mouth Every Night.

## 2025-07-15 NOTE — OP NOTE
Orthopaedics Operative Report    PREOPERATIVE DIAGNOSIS: Displaced right subcapital femoral neck fracture    POSTOPERATIVE DIAGNOSIS: Same    PROCEDURE PERFORMED: Cemented right bipolar hip hemiarthroplasty, CPT 24086    SURGEON: Salas Arellano MD    ANESTHESIA:  Choice    STAFF:  Circulator: Dunia Dash RN; Claudia Puentes, KAYLEE; Genevieve Mason RN  Scrub Person: Imelda Pratt; Lior Willis  Vendor Representative: Jose Castaneda  Nursing Assistant: Apple Lee PCT; Sydni Mazariegos  Assistant: Karoline Tyson PA  Other: Alejandro, Felicia    ESTIMATED BLOOD LOSS: 50 mL    COMPLICATIONS: None apparent.    Surgical Approach: Hip Posterior     IMPLANTS: [place] Echo Hip System, size 9 Echo cemented stem, std body, size 41+3 bipolar head and neck    PREOPERATIVE ANTIBIOTICS: Kefzol    INDICATIONS: Pain and to restore mobility    DESCRIPTION OF PROCEDURE:     After informed consent was obtained, the patient was taken to the operating room.  After the smooth induction of general anesthesia patient was placed in the left side down and right side up lateral decubitus position.  After taking care to pad all bony prominences, the right hip was then prepped and draped in the usual fashion for this type of procedure.  We then performed timeout to verify the site and the procedure to be performed, we began with a standard posterior approach to the right hip.  We dissected sharply and bluntly and identified our deep fascia.  This was divided along the sulcus of the posterior aspect of the proximal femur.  The gluteus maximum muscle was then divided bluntly.  Charnley retractor was then placed.  We took down the trochanteric bursa.  This exposed our short external rotators which were then tagged and divided.  These were then used to retract the sciatic nerve which was with continuity throughout the procedure.  We then identified our capsule and quadratus femoris.  These were taken down and perforating branches of the  medial femoral circumflex artery were then cauterized.  A T-capsulotomy was performed and each limb was tagged for later closure.  The head was then removed without difficulty using a Felton elevator.  It was sized on the back table to be a size 41.  We then freshened up the cut on the neck of the femur approximately 1 fingerbreadth above the lesser trochanter.  It was evident due to the quality of the bone that cementing the stem would be most appropriate.  We then prepared the proximal femur in the usual fashion using sequential broaching.  We broached up to a size 11 which had reasonably good fit and fill.  We accounted for anteversion as well.  We trialed with a +0 head to make sure that our length was okay.  We had good stability and no shuck was present.  We then dislocated the trials, thoroughly irrigated the canal, and cemented a size 9 stem using third generation cementing techniques.  Once the cement had cured, excess was removed.  The acetabulum was checked and cleared of all debris.  We again trialed with a size 41+3 head and had stability with flexion to 90°, internal rotation to 55°, we were not too tight in extension, and there was no shuck present.  We then dislocated the trial head and placed our final bipolar implant into place without difficulty.  Final reduction was performed with the same stability parameters encountered.  We then thoroughly irrigated the incision.  The capsule was closed using #5 Ethibond.  The short external rotators were repaired to the gluteus medius tendon.  The deep fascia was closed using interrupted 0 Ethibond.  The subcutaneous layer was closed using running 0 chromic as well as interrupted 2-0 Vicryl.  The skin was closed using 3-0 running Monocryl, skin glue, and then covered with an Aquacel dressing.  Patient was allowed to awaken from the anesthetic and placed back in the supine position and then taken to the recovery room in stable condition.  All counts were correct  postoperatively.  I performed the case.      Assistant: Karoline Tyson PA-C    The skilled assistance of the above noted first assistant was necessary during this complex surgical procedure.  The surgical assistant assisted with every aspect of the operation including, but not limited to, proper and safe positioning of the patient, obtaining adequate surgical exposure, manipulation of surgical instruments, suture management, surgical knot tying when necessary, the continual process of hemostasis during the procedure itself in addition to surgical wound closure and removal of the patient from the operating table and returning the patient back to the [facility].  The assistance of the surgical assistant allowed me to perform the most sensitive and technical portions of this operation using 2 hands, thus enhancing efficiency and patient safety.  This would not be possible without the help of a skilled assistant familiar with the procedure and capable of safely performing the aforementioned tasks.         POSTOPERATIVE PLAN:  1. Weight bearing status: Weight-bear as tolerated right lower extremity.  Posterior precautions of 6 weeks  2.  Indwelling fascia iliaca block and tramadol for pain control  3. 24-hours of IV Kefzol for antibiotic prophylaxis.  4.  Low-dose Lovenox for DVT prophylaxis.  5. [facility]  6.  PT/OT in the am    Salas Arellnao M.D.*

## 2025-07-15 NOTE — H&P
[facility] Medicine Services  HISTORY AND PHYSICAL    Patient Name: Chula Chen  : 1931  MRN: [id number]  Primary Care Physician: Wendy, No Known  Date of admission: 7/15/2025      Subjective   Subjective     Chief Complaint:  S/p fall, right hip fracture    HPI:  Chula Chen is a 89 y.o. female with past medical history significant for hypertension, essential tremors, history of COVID, and assisted living facility resident.  Evidently patient was in her usual state of health until earlier this morning when she woke up and tried to go to the bathroom using her walker.  In the bathroom evidently she lost the control of the walker and had a fall.  Patient was brought to the emergency room after that.  Imaging showed minimally displaced right subcapital femoral neck fracture.  As mentioned above patient was seen in her usual state of health before this event.  No fever or chills.  No chest pain palpitation shortness of breath.  No nausea or vomiting or diarrhea.  No cough or coryza or any sign of respiratory illness.  At the time of my examination patient also complained of suprapubic discomfort.  She told me that she has been trying to void for a few hours but cannot.  She also mentioned that greatly she has difficulty with urination.  She experiences discomfort when she voids.      Personal History     Past Medical History:   Diagnosis Date    Cystocele, midline     Hemorrhoids            Past Surgical History:   Procedure Laterality Date    BREAST BIOPSY      cyst removed    CATARACT EXTRACTION Bilateral     THYROID SURGERY      nodule removal, Benign       Family History: family history includes Breast cancer in her daughter; Diabetes in her mother and sister; Heart attack in her father.     Social History:  reports that she has quit smoking. She has never used smokeless tobacco. She reports that she does not drink alcohol and does not use drugs.  Social  History     Social History Narrative    Not on file       Medications:  Available home medication information reviewed.  Loratadine, acetaminophen, benzonatate, bimatoprost, calcium carbonate, cholecalciferol, citalopram, clonazePAM, docusate sodium, dorzolamide-timolol, fluticasone, guaiFENesin, multivitamin with minerals, olopatadine, ondansetron ODT, primidone, and traZODone    No Known Allergies    Objective   Objective     Vital Signs:   Temp:  [98.3 °F (36.8 °C)-98.7 °F (37.1 °C)] 98.7 °F (37.1 °C)  Heart Rate:  [] 106  Resp:  [16-20] 17  BP: ()/(52-97) 117/63  Flow (L/min) (Oxygen Therapy):  [2-3] 3       Physical Exam   Constitutional: No acute distress, awake, alert  HENT: NCAT, mucous membranes moist  Respiratory: Clear to auscultation bilaterally, respiratory effort normal   Cardiovascular: RRR, no murmurs, rubs, or gallops  Gastrointestinal: Positive bowel sounds, soft, nontender, nondistended  Musculoskeletal: No bilateral ankle edema  Psychiatric: Appropriate affect, cooperative  Neurologic: Awake, alert, follows commands, speech clear  Skin: No rashes     Result Review:  I have personally reviewed the results from the time of this admission to 7/15/2025 16:03 EDT and agree with these findings:  [x]  Laboratory list / accordion  []  Microbiology  [x]  Radiology  []  EKG/Telemetry   []  Cardiology/Vascular   []  Pathology  [x]  Old records  []  Other:  Most notable findings include: UA shows large leukocyte Estrace and 2+ blood.  Comprehensive metabolic panel shows glucose of 125, BUN 18.5, creatinine 0.49.  Liver enzymes are slightly elevated.  ALT is 87, AST is 36, alkaline phosphatase is 152.  Bilirubin is normal at 0.2.  CBC shows WBC of 11.97, other parameters are within normal limits.      LAB RESULTS:      Lab 07/15/25  0248   WBC 11.97*   HEMOGLOBIN 13.3   HEMATOCRIT 40.2   PLATELETS 432   NEUTROS ABS 8.06*   IMMATURE GRANS (ABS) 0.05   LYMPHS ABS 2.69   MONOS ABS 0.93*   EOS ABS  0.21   MCV 83.4         Lab 07/15/25  1105 07/15/25  0248   SODIUM  --  134*   POTASSIUM  --  3.8   CHLORIDE  --  100   CO2  --  22.0   ANION GAP  --  12.0   BUN  --  18.5   CREATININE  --  0.49*   EGFR  --  87.5   GLUCOSE  --  125*   CALCIUM  --  9.2   MAGNESIUM 2.0  --    PHOSPHORUS 2.5  --          Lab 07/15/25  0248   TOTAL PROTEIN 6.9   ALBUMIN 3.3*   GLOBULIN 3.6   ALT (SGPT) 87*   AST (SGOT) 36*   BILIRUBIN 0.2   ALK PHOS 152*                     UA          7/15/2025    09:58   Urinalysis   Squamous Epithelial Cells, UA 0-2    Specific Gravity, UA 1.017    Ketones, UA 15 mg/dL (1+)    Blood, UA Moderate (2+)    Leukocytes, UA Large (3+)    Nitrite, UA Negative    RBC, UA 6-10    WBC, UA Too Numerous to Count    Bacteria, UA 4+        Microbiology Results (last 10 days)       ** No results found for the last 240 hours. **            RIGHt FI cath  Result Date: 7/15/2025  Oren Wiggins CRNA     7/15/2025  1:22 PM RIGHt FI cath Patient reassessed immediately prior to procedure Reason for block: at surgeon's request and post-op pain management Performed by CRNA/CAA: Oren Wiggins CRNA Assisted by: Genevieve Floyd RN Preanesthetic Checklist Completed: patient identified, IV checked, site marked, risks and benefits discussed, surgical consent, monitors and equipment checked, pre-op evaluation and timeout performed Prep: Pt Position: supine Sterile barriers:cap, gloves, mask and washed/disinfected hands Prep: ChloraPrep Patient monitoring: blood pressure monitoring, continuous pulse oximetry and EKG Procedure Sedation: no Performed under: local infiltration Guidance:ultrasound guided ULTRASOUND INTERPRETATION.  Using ultrasound guidance a 20 G gauge needle was placed in close proximity to the nerve, at which point, under ultrasound guidance anesthetic was injected in the area of the nerve and spread of the anesthesia was seen on ultrasound in close proximity thereto.  There were no abnormalities seen on  "ultrasound; a digital image was taken; and the patient tolerated the procedure with no complications. Images:still images obtained, printed/placed on chart Laterality:right Block Type:fascia iliaca compartment Injection Technique:catheter Needle Type:echogenic and Tuohy Needle Gauge:18 G Resistance on Injection: none Catheter Size:20 G (20g) Cath Depth at skin: 11 cm Post Assessment Injection Assessment: negative aspiration for heme, no paresthesia on injection and incremental injection Patient Tolerance:comfortable throughout block Complications:no Additional Notes CKAFASCIAILIACA: CATHETER A high-frequency linear transducer, with sterile cover, was placed in parasagittal plane on top of the Anterior Superior Iliac Spine (ASIS) and moved medially to identify the Internal Oblique muscle, Sartorius muscle, Iliacus Muscle, Fascia Iliaca (FI) and Fascia Latae. The insertion site was prepped and draped in sterile fashion. Skin and cutaneous tissue was infiltrated with 2-5 ml of 1% Lidocaine. Using ultrasound-guidance, an 18-gauge Contiplex Ultra 360 Touhy needle was advanced in plane from caudad to cephalad. Preservative-free normal saline was utilized for hydro-dissection of tissue, advancement of Touhy, and to confirm final needle placement below FI. Local anesthetic in incremental 3-5 ml injections. Aspiration every 5 ml to prevent intravascular injection. Injection was completed with negative aspiration of blood and negative intravascular injection. Injection pressures were normal with minimal resistance. A 20-gauge Contiplex Echo catheter was placed through the needle and advance out the tip of the Touhy 3-5 cm. The Touhy needle was then removed, and final catheter position verified below the FI. The catheter was secured in the usual fashion with skin glue, benzoin, steri-strips, CHG tegaderm and Label noting \"Nerve Block Catheter\". Jerk tape applied at yellow connector and catheter connection. Performed by: Feliciano " PRAMOD Lott     CT Head Without Contrast  Result Date: 7/15/2025  CT HEAD WO CONTRAST Date of Exam: 7/15/2025 1:55 AM EDT Indication: head injury. Comparison: None available. Technique: Axial CT images were obtained of the head without contrast administration.  Automated exposure control and iterative construction methods were used. Findings: There is mild diffuse generalized atrophy. There are low-attenuation areas in the periventricular white matter consistent with chronic microvascular ischemic change. There is no mass, mass effect or midline shift. There are no abnormal extra-axial fluid collections or areas of acute hemorrhage. There is mild left maxillary sinus disease. The mastoid air cells are clear. There is a skin laceration in the right lateral frontal region with no radiopaque foreign body or underlying bony abnormality.     Impression: Impression: Atrophy and chronic microvascular ischemic change. No acute intracranial process. Electronically Signed: Dav Reed MD  7/15/2025 2:41 AM EDT  Workstation ID: [id number]    XR Hip With or Without Pelvis 2 - 3 View Right  Result Date: 7/15/2025  XR HIP W OR WO PELVIS 2-3 VIEW RIGHT Date of Exam: 7/15/2025 1:40 AM EDT Indication: hip injury Comparison: None available. Findings: There is an abnormal appearance at the subcapital portion of the right femur suspected to represent a fracture. There is bilateral hip arthritic change. There are no lytic or destructive bony lesions.     Impression: Impression: Suspected subcapital right femoral neck fracture. Electronically Signed: Dav Reed MD  7/15/2025 2:23 AM EDT  Workstation ID: [id number]          Assessment & Plan   Assessment & Plan       Hip fracture    Chula Chen is a 89 y.o. female with past medical history significant for hypertension, essential tremors, history of COVID, and assisted living facility resident.  Evidently patient was in her usual state of health until earlier this morning when she  woke up and tried to go to the bathroom using her walker.  In the bathroom evidently she lost the control of the walker and had a fall.     Status post fall and right hip fracture  - X-ray of the right hip shows subcapital right femoral neck fracture  - Will admit the patient to telemetry and monitor  - Pain control  - N.p.o. for now for possible surgery later this afternoon.  - IV fluid  - Orthopedic surgery is already on board.    Difficulty with urination  - UA shows large leukocyte Estrace  - Patient also has discomfort when she urinates.  - Will get urine culture and also will In-N-Out out cath.      Depression  - Patient is on Celexa and also clonazepam at home.  Will continue the home medication and monitor    Essential tremors  - Patient is on primidone at home which we will continue.        VTE Prophylaxis:  Mechanical VTE prophylaxis orders are present.          CODE STATUS:    Code Status and Medical Interventions: CPR (Attempt to Resuscitate); Full Support   Ordered at: 07/15/25 0951     Code Status (Patient has no pulse and is not breathing):    CPR (Attempt to Resuscitate)     Medical Interventions (Patient has pulse or is breathing):    Full Support     Total time spent was 55 minutes.  Expected Discharge   Expected discharge date/ time has not been documented.     Willie Acuna MD  07/15/25

## 2025-07-15 NOTE — CONSULTS
Orthopaedic Consult Note      Patient Care Team:  Provider, No Known as PCP - General    Chief complaint   Right hip pain    Subjective .     History of present illness:    Patient is a 89-year-old female who was up in the middle of the night using the restroom and did not call for help.  She says that she tripped over her walker and hit the right side of her face and her right hip.  Was unable to bear weight after falling.  Was seen at the [facility] ER and sent here for further evaluation and treatment for management of her right hip fracture.    Review of Systems:  All systems reviewed are negative except for what is stated in the HPI       History  Family History   Problem Relation Age of Onset    Diabetes Mother     Heart attack Father     Diabetes Sister     Breast cancer Daughter      Social History     Socioeconomic History    Marital status:    Tobacco Use    Smoking status: Former    Smokeless tobacco: Never   Vaping Use    Vaping status: Never Used   Substance and Sexual Activity    Alcohol use: Never    Drug use: Never     Past Surgical History:   Procedure Laterality Date    BREAST BIOPSY      cyst removed    CATARACT EXTRACTION Bilateral 2005    THYROID SURGERY      nodule removal, Benign     Past Medical History:   Diagnosis Date    Cystocele, midline     Hemorrhoids      No Known Allergies    Current Facility-Administered Medications:     acetaminophen (TYLENOL) tablet 650 mg, 650 mg, Oral, Q4H PRN **OR** acetaminophen (TYLENOL) 160 MG/5ML oral solution 650 mg, 650 mg, Oral, Q4H PRN **OR** acetaminophen (TYLENOL) suppository 650 mg, 650 mg, Rectal, Q4H PRN, Willie Acuna MD    calcium carbonate (TUMS) chewable tablet 500 mg (200 mg elemental), 1 tablet, Oral, TID PRN, Willie Acuna MD    Calcium Replacement - Follow Nurse / BPA Driven Protocol, , Not Applicable, PRN, Willie Acuna MD    citalopram (CeleXA) tablet 20 mg, 20 mg, Oral, Daily, Willie Acuna MD, 20 mg  at 07/15/25 1216    clonazePAM (KlonoPIN) tablet 0.5 mg, 0.5 mg, Oral, Nightly PRN, Willie Acuna MD    docusate sodium (COLACE) capsule 100 mg, 100 mg, Oral, BID, Willie Acuna MD, 100 mg at 07/15/25 1216    dorzolamide (TRUSOPT) 2 % ophthalmic solution 1 drop, 1 drop, Both Eyes, Daily, Willie Acuna MD    fluticasone (FLONASE) 50 MCG/ACT nasal spray 2 spray, 2 spray, Nasal, Daily, Willie Acuna MD, 2 spray at 07/15/25 1216    Magnesium Standard Dose Replacement - Follow Nurse / BPA Driven Protocol, , Not Applicable, PRN, Willie Acuna MD    morphine injection 1 mg, 1 mg, Intravenous, Q4H PRN **AND** naloxone (NARCAN) injection 0.4 mg, 0.4 mg, Intravenous, Q5 Min PRN, Willie Acuna MD    ondansetron ODT (ZOFRAN-ODT) disintegrating tablet 4 mg, 4 mg, Oral, Q6H PRN **OR** ondansetron (ZOFRAN) injection 4 mg, 4 mg, Intravenous, Q6H PRN, Willie Acuna MD    Phosphorus Replacement - Follow Nurse / BPA Driven Protocol, , Not Applicable, PRN, Willie Acuna MD    Potassium Replacement - Follow Nurse / BPA Driven Protocol, , Not Applicable, PRN, Willie Acuna MD    primidone (MYSOLINE) tablet 50 mg, 50 mg, Oral, TID, Willie Acuna MD    sodium chloride 0.9 % flush 10 mL, 10 mL, Intravenous, Q12H, Willie Acuna MD, 10 mL at 07/15/25 1217    sodium chloride 0.9 % flush 10 mL, 10 mL, Intravenous, PRN, Willie Acuna MD    sodium chloride 0.9 % infusion 40 mL, 40 mL, Intravenous, PRN, Willie Acuna MD    sodium chloride 0.9 % infusion, 80 mL/hr, Intravenous, Continuous, Willie Acuna MD    timolol (TIMOPTIC) 0.5 % ophthalmic solution 1 drop, 1 drop, Both Eyes, Daily, Willie Acuna MD    Objective     Vital Signs   Temp:  [98.3 °F (36.8 °C)-98.6 °F (37 °C)] 98.3 °F (36.8 °C)  Heart Rate:  [] 117  Resp:  [16-20] 18  BP: ()/(52-97) 117/69      Physical Exam:   Right hip: Patient has some pain with passive range of motion of the hip.  The right lower extremity  is not shortened or externally rotated.  EHL, FHL, gastrocsoleus, and tibialis anterior are intact.  There are no visible abrasions at the hip.  Small abrasion noted at the right knee.    Labs:  Lab Results (last 24 hours)       Procedure Component Value Units Date/Time    Magnesium [[id number]] Collected: 07/15/25 1105    Specimen: Blood Updated: 07/15/25 1232    Phosphorus [[id number]] Collected: 07/15/25 1105    Specimen: Blood Updated: 07/15/25 1232    Urinalysis, Microscopic Only - Straight Cath [[id number]]  (Abnormal) Collected: 07/15/25 0958    Specimen: Urine from Straight Cath Updated: 07/15/25 1100     RBC, UA 6-10 /HPF      WBC, UA Too Numerous to Count /HPF      Bacteria, UA 4+ /HPF      Squamous Epithelial Cells, UA 0-2 /HPF      Hyaline Casts, UA 3-6 /LPF      Methodology Manual Light Microscopy    Urine Culture - Urine, Straight Cath [[id number]] Collected: 07/15/25 0958    Specimen: Urine from Straight Cath Updated: 07/15/25 1100    Urinalysis With Culture If Indicated - Straight Cath [[id number]]  (Abnormal) Collected: 07/15/25 0958    Specimen: Urine from Straight Cath Updated: 07/15/25 1032     Color, UA Yellow     Appearance, UA Turbid     pH, UA 6.0     Specific Gravity, UA 1.017     Glucose, UA Negative     Ketones, UA 15 mg/dL (1+)     Bilirubin, UA Negative     Blood, UA Moderate (2+)     Protein, UA 30 mg/dL (1+)     Leuk Esterase, UA Large (3+)     Nitrite, UA Negative     Urobilinogen, UA 1.0 E.U./dL    Narrative:      In absence of clinical symptoms, the presence of pyuria, bacteria, and/or nitrites on the urinalysis result does not correlate with infection.    Comprehensive Metabolic Panel [[id number]]  (Abnormal) Collected: 07/15/25 0248    Specimen: Blood Updated: 07/15/25 0309     Glucose 125 mg/dL      BUN 18.5 mg/dL      Creatinine 0.49 mg/dL      Sodium 134 mmol/L      Potassium 3.8 mmol/L      Chloride 100 mmol/L      CO2 22.0 mmol/L      Calcium 9.2 mg/dL      Total Protein 6.9 g/dL       Albumin 3.3 g/dL      ALT (SGPT) 87 U/L      AST (SGOT) 36 U/L      Alkaline Phosphatase 152 U/L      Total Bilirubin 0.2 mg/dL      Globulin 3.6 gm/dL      A/G Ratio 0.9 g/dL      BUN/Creatinine Ratio 37.8     Anion Gap 12.0 mmol/L      eGFR 87.5 mL/min/1.73     Narrative:      GFR Categories in Chronic Kidney Disease (CKD)              GFR Category          GFR (mL/min/1.73)    Interpretation  G1                    90 or greater        Normal or high (1)  G2                    60-89                Mild decrease (1)  G3a                   45-59                Mild to moderate decrease  G3b                   30-44                Moderate to severe decrease  G4                    15-29                Severe decrease  G5                    14 or less           Kidney failure    (1)In the absence of evidence of kidney disease, neither GFR category G1 or G2 fulfill the criteria for CKD.    eGFR calculation 2021 CKD-EPI creatinine equation, which does not include race as a factor    CBC & Differential [[id number]]  (Abnormal) Collected: 07/15/25 0248    Specimen: Blood Updated: 07/15/25 0252    Narrative:      The following orders were created for panel order CBC & Differential.  Procedure                               Abnormality         Status                     ---------                               -----------         ------                     CBC Auto Differential[[id number]]        Abnormal            Final result                 Please view results for these tests on the individual orders.    CBC Auto Differential [[id number]]  (Abnormal) Collected: 07/15/25 0248    Specimen: Blood Updated: 07/15/25 0252     WBC 11.97 10*3/mm3      RBC 4.82 10*6/mm3      Hemoglobin 13.3 g/dL      Hematocrit 40.2 %      MCV 83.4 fL      MCH 27.6 pg      MCHC 33.1 g/dL      RDW 14.2 %      RDW-SD 43.5 fl      MPV 8.9 fL      Platelets 432 10*3/mm3      Neutrophil % 67.2 %      Lymphocyte % 22.5 %      Monocyte % 7.8 %       Eosinophil % 1.8 %      Basophil % 0.3 %      Immature Grans % 0.4 %      Neutrophils, Absolute 8.06 10*3/mm3      Lymphocytes, Absolute 2.69 10*3/mm3      Monocytes, Absolute 0.93 10*3/mm3      Eosinophils, Absolute 0.21 10*3/mm3      Basophils, Absolute 0.03 10*3/mm3      Immature Grans, Absolute 0.05 10*3/mm3             Imaging:  We have reviewed and interpreted an x-ray of the patient's right hip from the emergency department.  There appears to be a subcapital right femoral neck fracture that is minimally displaced        Assessment & Plan   89-year-old female who sustained a ground-level fall and a minimally displaced right subcapital femoral neck fracture.  In order to facilitate mobility and avoid a potential second surgery, I have encouraged the patient to undergo right hip hemiarthroplasty rather than ORIF.  We have discussed this with the patient's daughter via telephone this afternoon as well.  The risk, benefits, potential hazards, typical recovery and rehab as well as reasonable alternatives were discussed as well.  They both had the opportunity ask questions and agreed to proceed.  Will get this done this afternoon.  Hopefully anesthesia will place a fascia iliaca block to limit postop narcotic medication requirements.      Hip fracture        I discussed the patient's findings and my recommendations as well as treatment options and alternatives with patient and family.  Surgical versus non surgical treatment options were discussed as well.  We reviewed the nature of the planned procedure including the risks, benefits and potential complications.  Understanding was expressed and the decision was made to proceed with surgery.      Salas Arellano MD  07/15/25  12:52 EDT

## 2025-07-15 NOTE — ANESTHESIA POSTPROCEDURE EVALUATION
Patient: Chula Chen    Procedure Summary       Date: 07/15/25 Room / Location:  [facility]    Anesthesia Start: 1540 Anesthesia Stop: 1828    Procedure: HIP HEMIARTHROPLASTY (Right: Hip) Diagnosis:     Surgeons: Salas Arellano MD Provider: Genevieve Guillory DO    Anesthesia Type: general with block ASA Status: 3            Anesthesia Type: general with block    Vitals  Vitals Value Taken Time   BP     Temp     Pulse 90 07/15/25 18:27   Resp     SpO2 89 % 07/15/25 18:27   Vitals shown include unfiled device data.        Post Anesthesia Care and Evaluation    Patient location during evaluation: PACU  Patient participation: complete - patient participated  Level of consciousness: awake and alert  Pain management: adequate    Airway patency: patent  Anesthetic complications: No anesthetic complications  PONV Status: none  Cardiovascular status: hemodynamically stable and acceptable  Respiratory status: nonlabored ventilation, acceptable, nasal cannula and airway suctioned  Hydration status: acceptable    Comments: To PACU on O2NC, breathing comfortably.  Report to PACU RN at bedside. VSS.

## 2025-07-15 NOTE — ANESTHESIA PROCEDURE NOTES
Airway  Reason: elective    Date/Time: 7/15/2025 3:46 PM  Airway not difficult    General Information and Staff    Patient location during procedure: OR  CRNA/CAA: Genevieve Ignacio CRNA    Indications and Patient Condition  Indications for airway management: airway protection    Preoxygenated: yes  MILS not maintained throughout    Mask difficulty assessment: 1 - vent by mask    Final Airway Details    Final airway type: endotracheal airway      Successful airway: ETT  Cuffed: yes   Successful intubation technique: video laryngoscopy  Endotracheal tube insertion site: oral  Blade: Papo  Blade size: 3  ETT size (mm): 7.5  Cormack-Lehane Classification: grade I - full view of glottis  Placement verified by: chest auscultation and capnometry   Measured from: lips  ETT/EBT  to lips (cm): 20  Number of attempts at approach: 1  Assessment: lips, teeth, and gum same as pre-op and atraumatic intubation    Additional Comments  Negative epigastric sounds, Breath sound equal bilaterally with symmetric chest rise and fall           Wheelchair

## 2025-07-15 NOTE — ANESTHESIA PROCEDURE NOTES
RIGHt FI cath      Patient reassessed immediately prior to procedure    Start time: 7/15/2025 1:35 PM  Reason for block: at surgeon's request and post-op pain management  Performed by  CRNA/CAA: Oren Wiggins CRNA  Assisted by: Genevieve Floyd RN  Preanesthetic Checklist  Completed: patient identified, IV checked, site marked, risks and benefits discussed, surgical consent, monitors and equipment checked, pre-op evaluation and timeout performed  Prep:  Pt Position: supine  Sterile barriers:cap, gloves, mask and washed/disinfected hands  Prep: ChloraPrep  Patient monitoring: blood pressure monitoring, continuous pulse oximetry and EKG  Procedure    Sedation: no  Performed under: local infiltration  Guidance:ultrasound guided    ULTRASOUND INTERPRETATION.  Using ultrasound guidance a 20 G gauge needle was placed in close proximity to the nerve, at which point, under ultrasound guidance anesthetic was injected in the area of the nerve and spread of the anesthesia was seen on ultrasound in close proximity thereto.  There were no abnormalities seen on ultrasound; a digital image was taken; and the patient tolerated the procedure with no complications. Images:still images obtained, printed/placed on chart    Laterality:right  Block Type:fascia iliaca compartment  Injection Technique:catheter  Needle Type:echogenic and Tuohy  Needle Gauge:18 G  Resistance on Injection: none  Catheter Size:20 G (20g)  Cath Depth at skin: 10 cm    Medications Used: ropivacaine (NAROPIN) 0.5 % injection - Injection   20 mL - 7/15/2025 1:35:00 PM      Medications  Preservative Free Saline:20ml    Post Assessment  Injection Assessment: negative aspiration for heme, no paresthesia on injection and incremental injection  Patient Tolerance:comfortable throughout block  Complications:no  Additional Notes  CKAFASCIAILIACA: CATHETER  A high-frequency linear transducer, with sterile cover, was placed in parasagittal plane on top of the Anterior  "Superior Iliac Spine (ASIS) and moved medially to identify the Internal Oblique muscle, Sartorius muscle, Iliacus Muscle, Fascia Iliaca (FI) and Fascia Latae. The insertion site was prepped and draped in sterile fashion. Skin and cutaneous tissue was infiltrated with 2-5 ml of 1% Lidocaine. Using ultrasound-guidance, an 18-gauge Contiplex Ultra 360 Touhy needle was advanced in plane from caudad to cephalad. Preservative-free normal saline was utilized for hydro-dissection of tissue, advancement of Touhy, and to confirm final needle placement below FI. Local anesthetic in incremental 3-5 ml injections. Aspiration every 5 ml to prevent intravascular injection. Injection was completed with negative aspiration of blood and negative intravascular injection. Injection pressures were normal with minimal resistance. A 20-gauge Contiplex Echo catheter was placed through the needle and advance out the tip of the Touhy 3-5 cm. The Touhy needle was then removed, and final catheter position verified below the FI. The catheter was secured in the usual fashion with skin glue, benzoin, steri-strips, CHG tegaderm and Label noting \"Nerve Block Catheter\". Jerk tape applied at yellow connector and catheter connection.   Performed by: Oren Wiggins, CRNA          "

## 2025-07-15 NOTE — ANESTHESIA PREPROCEDURE EVALUATION
Anesthesia Evaluation     Patient summary reviewed and Nursing notes reviewed   no history of anesthetic complications:   NPO Solid Status: > 8 hours  NPO Liquid Status: > 2 hours           Airway   Mallampati: II  TM distance: >3 FB  Neck ROM: full  No difficulty expected and Small opening  Dental - normal exam     Pulmonary - normal exam   (+) a smoker Former,  Cardiovascular - normal exam    ECG reviewed    (+) hypertension  (-) dysrhythmias, angina, CHF, cardiac stents      Neuro/Psych  (+) psychiatric history Depression  (-) seizures, CVA  GI/Hepatic/Renal/Endo    (+) GERD, thyroid problem     Musculoskeletal     Abdominal    Substance History - negative use     OB/GYN          Other        ROS/Med Hx Other: Hgb 13.3 k 3.8   Ct H - atrophy and chronic microvascular ischemic change. No acute intracranial process.  No n/v/gerd/glp1                Anesthesia Plan    ASA 3     general with block     (Risks and benefits of general anesthesia discussed with patient (including MI, CVA, death, recall, aspiration, oropharyngeal/dental damage), questions answered, agreeable to proceed.  FI in place    VL ready  MAP 80+ at all times)  intravenous induction     Anesthetic plan, risks, benefits, and alternatives have been provided, discussed and informed consent has been obtained with: patient.    Use of blood products discussed with patient  Consented to blood products.    Plan discussed with CRNA.    CODE STATUS:    Code Status (Patient has no pulse and is not breathing): CPR (Attempt to Resuscitate)  Medical Interventions (Patient has pulse or is breathing): Full Support

## 2025-07-16 ENCOUNTER — APPOINTMENT (OUTPATIENT)
Dept: CARDIOLOGY | Facility: HOSPITAL | Age: OVER 89
DRG: 522 | End: 2025-07-16
Payer: MEDICARE

## 2025-07-16 LAB
ANION GAP SERPL CALCULATED.3IONS-SCNC: 10.9 MMOL/L (ref 5–15)
BASOPHILS # BLD AUTO: 0.03 10*3/MM3 (ref 0–0.2)
BASOPHILS NFR BLD AUTO: 0.3 % (ref 0–1.5)
BUN SERPL-MCNC: 9.9 MG/DL (ref 8–23)
BUN/CREAT SERPL: 18.7 (ref 7–25)
CALCIUM SPEC-SCNC: 8 MG/DL (ref 8.2–9.6)
CHLORIDE SERPL-SCNC: 105 MMOL/L (ref 98–107)
CO2 SERPL-SCNC: 22.1 MMOL/L (ref 22–29)
CREAT SERPL-MCNC: 0.53 MG/DL (ref 0.57–1)
DEPRECATED RDW RBC AUTO: 44 FL (ref 37–54)
EGFRCR SERPLBLD CKD-EPI 2021: 85.8 ML/MIN/1.73
EOSINOPHIL # BLD AUTO: 0.12 10*3/MM3 (ref 0–0.4)
EOSINOPHIL NFR BLD AUTO: 1 % (ref 0.3–6.2)
ERYTHROCYTE [DISTWIDTH] IN BLOOD BY AUTOMATED COUNT: 14.5 % (ref 12.3–15.4)
GLUCOSE SERPL-MCNC: 170 MG/DL (ref 65–99)
HCT VFR BLD AUTO: 31.1 % (ref 34–46.6)
HGB BLD-MCNC: 10.2 G/DL (ref 12–15.9)
IMM GRANULOCYTES # BLD AUTO: 0.04 10*3/MM3 (ref 0–0.05)
IMM GRANULOCYTES NFR BLD AUTO: 0.3 % (ref 0–0.5)
LYMPHOCYTES # BLD AUTO: 1.63 10*3/MM3 (ref 0.7–3.1)
LYMPHOCYTES NFR BLD AUTO: 13.6 % (ref 19.6–45.3)
MCH RBC QN AUTO: 27.5 PG (ref 26.6–33)
MCHC RBC AUTO-ENTMCNC: 32.8 G/DL (ref 31.5–35.7)
MCV RBC AUTO: 83.8 FL (ref 79–97)
MONOCYTES # BLD AUTO: 0.86 10*3/MM3 (ref 0.1–0.9)
MONOCYTES NFR BLD AUTO: 7.2 % (ref 5–12)
NEUTROPHILS NFR BLD AUTO: 77.6 % (ref 42.7–76)
NEUTROPHILS NFR BLD AUTO: 9.31 10*3/MM3 (ref 1.7–7)
NRBC BLD AUTO-RTO: 0 /100 WBC (ref 0–0.2)
PLATELET # BLD AUTO: 319 10*3/MM3 (ref 140–450)
PMV BLD AUTO: 9.9 FL (ref 6–12)
POTASSIUM SERPL-SCNC: 3.7 MMOL/L (ref 3.5–5.2)
RBC # BLD AUTO: 3.71 10*6/MM3 (ref 3.77–5.28)
SODIUM SERPL-SCNC: 138 MMOL/L (ref 136–145)
WBC NRBC COR # BLD AUTO: 11.99 10*3/MM3 (ref 3.4–10.8)

## 2025-07-16 PROCEDURE — 97161 PT EVAL LOW COMPLEX 20 MIN: CPT

## 2025-07-16 PROCEDURE — 80048 BASIC METABOLIC PNL TOTAL CA: CPT | Performed by: ORTHOPAEDIC SURGERY

## 2025-07-16 PROCEDURE — 25010000002 CEFAZOLIN PER 500 MG: Performed by: ORTHOPAEDIC SURGERY

## 2025-07-16 PROCEDURE — 97166 OT EVAL MOD COMPLEX 45 MIN: CPT

## 2025-07-16 PROCEDURE — 25010000002 ENOXAPARIN PER 10 MG: Performed by: ORTHOPAEDIC SURGERY

## 2025-07-16 PROCEDURE — 25010000002 CEFTRIAXONE PER 250 MG: Performed by: INTERNAL MEDICINE

## 2025-07-16 PROCEDURE — 99233 SBSQ HOSP IP/OBS HIGH 50: CPT | Performed by: INTERNAL MEDICINE

## 2025-07-16 PROCEDURE — 99024 POSTOP FOLLOW-UP VISIT: CPT | Performed by: ORTHOPAEDIC SURGERY

## 2025-07-16 PROCEDURE — 85025 COMPLETE CBC W/AUTO DIFF WBC: CPT | Performed by: ORTHOPAEDIC SURGERY

## 2025-07-16 PROCEDURE — 25810000003 SODIUM CHLORIDE 0.9 % SOLUTION: Performed by: INTERNAL MEDICINE

## 2025-07-16 RX ORDER — SODIUM CHLORIDE 9 MG/ML
75 INJECTION, SOLUTION INTRAVENOUS CONTINUOUS
Status: DISCONTINUED | OUTPATIENT
Start: 2025-07-16 | End: 2025-07-17

## 2025-07-16 RX ORDER — MIDODRINE HYDROCHLORIDE 5 MG/1
5 TABLET ORAL
Status: DISCONTINUED | OUTPATIENT
Start: 2025-07-16 | End: 2025-07-19 | Stop reason: HOSPADM

## 2025-07-16 RX ADMIN — MIDODRINE HYDROCHLORIDE 5 MG: 5 TABLET ORAL at 12:17

## 2025-07-16 RX ADMIN — DOCUSATE SODIUM 100 MG: 100 CAPSULE, LIQUID FILLED ORAL at 21:06

## 2025-07-16 RX ADMIN — TRAMADOL HYDROCHLORIDE 50 MG: 50 TABLET, COATED ORAL at 21:06

## 2025-07-16 RX ADMIN — TRAMADOL HYDROCHLORIDE 50 MG: 50 TABLET, COATED ORAL at 08:16

## 2025-07-16 RX ADMIN — ENOXAPARIN SODIUM 30 MG: 100 INJECTION SUBCUTANEOUS at 08:17

## 2025-07-16 RX ADMIN — PRIMIDONE 50 MG: 50 TABLET ORAL at 08:17

## 2025-07-16 RX ADMIN — SODIUM CHLORIDE 500 ML: 9 INJECTION, SOLUTION INTRAVENOUS at 12:18

## 2025-07-16 RX ADMIN — SODIUM CHLORIDE 2 G: 900 INJECTION INTRAVENOUS at 08:17

## 2025-07-16 RX ADMIN — TIMOLOL MALEATE 1 DROP: 5 SOLUTION OPHTHALMIC at 12:17

## 2025-07-16 RX ADMIN — MIDODRINE HYDROCHLORIDE 5 MG: 5 TABLET ORAL at 17:02

## 2025-07-16 RX ADMIN — PRIMIDONE 50 MG: 50 TABLET ORAL at 16:03

## 2025-07-16 RX ADMIN — Medication 10 ML: at 21:08

## 2025-07-16 RX ADMIN — PRIMIDONE 50 MG: 50 TABLET ORAL at 21:06

## 2025-07-16 RX ADMIN — SODIUM CHLORIDE 75 ML/HR: 9 INJECTION, SOLUTION INTRAVENOUS at 17:06

## 2025-07-16 RX ADMIN — DORZOLAMIDE HCL 1 DROP: 20 SOLUTION/ DROPS OPHTHALMIC at 12:17

## 2025-07-16 RX ADMIN — DOCUSATE SODIUM 100 MG: 100 CAPSULE, LIQUID FILLED ORAL at 08:16

## 2025-07-16 RX ADMIN — SODIUM CHLORIDE 1000 MG: 900 INJECTION INTRAVENOUS at 14:11

## 2025-07-16 RX ADMIN — Medication 3 ML: at 21:18

## 2025-07-16 RX ADMIN — CITALOPRAM HYDROBROMIDE 20 MG: 20 TABLET ORAL at 08:16

## 2025-07-16 RX ADMIN — TRAMADOL HYDROCHLORIDE 50 MG: 50 TABLET, COATED ORAL at 14:25

## 2025-07-16 NOTE — PLAN OF CARE
Goal Outcome Evaluation:  Plan of Care Reviewed With: patient        Progress: no change (OT IE)  Outcome Evaluation: OT evaluation completed. Pt presents with decreased I in ADLs, related t/fs and mobility compared to PLOF limited by decreased activity tolerance, impaired balance, muscle weakness at BUEs, fatigue with more dynamic demands, decreased distal reach impacting LB ADLs further impacted by PHP. Pt req'd A for all ADLs including mod A for d/d gown, dep A for sock d/d and mod A x 2 for supine > sitting at EOB and max A x 2 for all STS t/fs at EOB and fxl t/f pivot to recliner initially with FWW and later w/ BUE support for closer proximity to pt. Pt improved safety and postural aligment with latter, still same level of A. Pt below occupational performance baseline and would benefit from IPOT POC and SNF at d/c when medically ready.    Anticipated Discharge Disposition (OT): skilled nursing facility

## 2025-07-16 NOTE — THERAPY EVALUATION
Patient Name: Chula Chen  : 1931    MRN: [id number]                              Today's Date: 2025       Admit Date: 7/15/2025    Visit Dx:     ICD-10-CM ICD-9-CM   1. Closed fracture of neck of right femur, initial encounter  S72.001A 820.8   2. Laceration without foreign body of scalp, initial encounter  S01.01XA 873.0     Patient Active Problem List   Diagnosis    Gastroesophageal reflux disease without esophagitis    Depression    Essential hypertension    Other insomnia    Benign essential tremor    Other constipation    Vitamin D deficiency    Urinary frequency    Pneumonia    COVID-19 virus detected    Cytokine release syndrome, grade 1    Hip fracture     Past Medical History:   Diagnosis Date    Cystocele, midline     Hemorrhoids      Past Surgical History:   Procedure Laterality Date    BREAST BIOPSY      cyst removed    CATARACT EXTRACTION Bilateral     HIP HEMIARTHROPLASTY Right 7/15/2025    Procedure: HIP HEMIARTHROPLASTY;  Surgeon: Salas Arellano MD;  Location: [facility];  Service: Orthopedics;  Laterality: Right;    THYROID SURGERY      nodule removal, Benign      General Information       Row Name 25 1422          Physical Therapy Time and Intention    Document Type evaluation (P)   -ES     Mode of Treatment physical therapy (P)   -ES       Row Name 25 1422          General Information    Patient Profile Reviewed yes (P)   -ES     Prior Level of Function independent:;all household mobility;community mobility;w/c or scooter;gait;bed mobility;transfer;min assist:;ADL's;dependent:;home management;driving (P)   Lives in [facility], all meals are delivered to her daily or occasionally she will walk to the dining ozuna. Ambulates with FWW AAT. Has a small threshhold tub with shower chair.  -ES     Existing Precautions/Restrictions fall;right;hip, posterior;oxygen therapy device and L/min;other (see comments) (P)   RLE WBAT s/p R hemiarthoplasty; R FI cath; PHP;  Scoliosis at baseline  -ES     Barriers to Rehab medically complex;previous functional deficit (P)   -ES       Row Name 07/16/25 1422          Living Environment    Current Living Arrangements assisted living facility (P)   -ES     People in Home facility resident (P)   -ES       Row Name 07/16/25 1422          Home Main Entrance    Number of Stairs, Main Entrance none (P)   -ES       Row Name 07/16/25 1422          Stairs Within Home, Primary    Number of Stairs, Within Home, Primary none (P)   -ES       Row Name 07/16/25 1422          Cognition    Orientation Status (Cognition) oriented x 3 (P)   -ES       Row Name 07/16/25 1422          Safety Issues/Impairments Affecting Functional Mobility    Safety Issues Affecting Function (Mobility) awareness of need for assistance;insight into deficits/self-awareness;judgment;positioning of assistive device;safety precaution awareness;safety precautions follow-through/compliance;sequencing abilities (P)   -ES     Impairments Affecting Function (Mobility) balance;coordination;endurance/activity tolerance;motor control;pain;postural/trunk control;strength;range of motion (ROM) (P)   -ES               User Key  (r) = Recorded By, (t) = Taken By, (c) = Cosigned By      Initials Name Provider Type    ES Sidra Corona, PT Student PT Student                   Mobility       Row Name 07/16/25 1518          Bed Mobility    Bed Mobility supine-sit (P)   -ES     Supine-Sit [place] (Bed Mobility) moderate assist (50% patient effort);2 person assist;verbal cues;nonverbal cues (demo/gesture) (P)   -ES     Assistive Device (Bed Mobility) head of bed elevated;bed rails;repositioning sheet (P)   -ES     Comment, (Bed Mobility) VC for BUE/BLE sequencing to move to EOB, and to move off of bed to sit in upright position. Upon sitting upright, pt demonstrated posterior lean, however was able to correct with VC and BUE assist on bed. Pt req many VC to achieve sitting in midline d/t  baseline scoliosis. (P)   -ES       Row Name 07/16/25 1518          Transfers    Comment, (Transfers) Pt educated on PHP precautions and how to maintain those through transfers and gait. (P)   -ES       Row Name 07/16/25 1518          Bed-Chair Transfer    Bed-Chair [place] (Transfers) maximum assist (25% patient effort);2 person assist;verbal cues;nonverbal cues (demo/gesture) (P)   -ES     Assistive Device (Bed-Chair Transfers) other (see comments) (P)   BUE Assist  -ES     Comment, (Bed-Chair Transfer) Upon standing, pt demosntrated decreased ability to advance RLE- only was able to demonstrate DF/PF of the R ankle. Pt req assist to move RLE during gait while taking small shuffled steps with the LLE. Pt demonstrated sig posterior lean during transfer and req increased assist to correct. Pt denied dizziness with transfer, only reported inc pain w WB through RLE. (P)   -ES       Row Name 07/16/25 1518          Sit-Stand Transfer    Sit-Stand [place] (Transfers) maximum assist (25% patient effort);2 person assist;verbal cues;nonverbal cues (demo/gesture) (P)   -ES     Assistive Device (Sit-Stand Transfers) walker, front-wheeled;other (see comments) (P)   BUE assist  -ES     Comment, (Sit-Stand Transfer) x2 from EOB with FWW: pt demonstrated sig posterior lean, req many VC to correct however pt was unable to do so without physical assist. x1 from EOB with BUE assist: Pt req less VC for ant lean due ot proximity of PT/OT bodies to patient for support. VC for BUE sequencing and BLE placement under body before standing. No overt LOB upon standing. (P)   -ES       Row Name 07/16/25 1518          Gait/Stairs (Locomotion)    [place] Level (Gait) 2 person assist;maximum assist (25% patient effort);nonverbal cues (demo/gesture);verbal cues (P)   -ES     Assistive Device (Gait) other (see comments) (P)   BUE assist  -ES     Patient was able to Ambulate yes (P)   -ES     Distance in Feet (Gait) 1 (P)   -ES      Deviations/Abnormal Patterns (Gait) bilateral deviations;base of support, narrow;karlene decreased;festinating/shuffling;gait speed decreased;stride length decreased;weight shifting decreased (P)   -ES     Bilateral Gait Deviations forward flexed posture;heel strike decreased (P)   -ES     Right Sided Gait Deviations decreased knee extension;weight shift ability decreased (P)   -ES     Maintains Weight-bearing Status (Gait) able to maintain (P)   -ES     [place] Level (Stairs) not tested (P)   -ES     Comment, (Gait/Stairs) Pt took short, shuffled side steps to the L to sit in chair. Pt req maxAx2 with physical assist to support upright posture, advancement of RLE, and pivoting movement to the chair. Pt unable to clear BLE from floor, however LLE was able to lead stepping, PT had to assist all advancement and movement of RLE. Pt with no c/o dizziness with side steps. No overt LOB. (P)   -ES       Row Name 07/16/25 1518          Mobility    Extremity Weight-bearing Status right lower extremity (P)   -ES     Right Lower Extremity (Weight-bearing Status) weight-bearing as tolerated (WBAT) (P)   -ES               User Key  (r) = Recorded By, (t) = Taken By, (c) = Cosigned By      Initials Name Provider Type    ES Sidra Corona, PT Student PT Student                   Obj/Interventions       Row Name 07/16/25 1527          Range of Motion Comprehensive    General Range of Motion bilateral lower extremity ROM WFL (P)   -ES     Comment, General Range of Motion BLE WFL via visual assessment (P)   -ES       Row Name 07/16/25 1527          Strength Comprehensive (MMT)    General Manual Muscle Testing (MMT) Assessment lower extremity strength deficits identified (P)   -ES     Comment, General Manual Muscle Testing (MMT) Assessment BLE grossly 2+/5. Formal assessment deferred d/t recent sx. (P)   -ES       Row Name 07/16/25 1527          Balance    Balance Assessment sitting static balance;sitting dynamic  balance;standing static balance;standing dynamic balance (P)   -ES     Static Sitting Balance standby assist;1-person assist (P)   -ES     Dynamic Sitting Balance contact guard;1-person assist (P)   -ES     Position, Sitting Balance unsupported;sitting edge of bed;supported;sitting in chair (P)   -ES     Static Standing Balance moderate assist;2-person assist;verbal cues;non-verbal cues (demo/gesture) (P)   -ES     Dynamic Standing Balance maximum assist;2-person assist;verbal cues;non-verbal cues (demo/gesture) (P)   -ES     Position/Device Used, Standing Balance supported;walker, front-wheeled;other (see comments) (P)   BUE assist  -ES     Balance Interventions supported;sit to stand;static;dynamic;standing;sitting (P)   -ES     Comment, Balance Pt with posterior lean sitting EOB and upon standing. Freq VC for ant weight shift forward. (P)   -ES       Row Name 07/16/25 1527          Sensory Assessment (Somatosensory)    Sensory Assessment (Somatosensory) LE sensation intact (P)   -ES               User Key  (r) = Recorded By, (t) = Taken By, (c) = Cosigned By      Initials Name Provider Type    ES Sidra Corona, PT Student PT Student                   Goals/Plan       Row Name 07/16/25 1604          Bed Mobility Goal 1 (PT)    Activity/Assistive Device (Bed Mobility Goal 1, PT) sit to supine/supine to sit (P)   -ES     [place] Level/Cues Needed (Bed Mobility Goal 1, PT) contact guard required (P)   -ES     Time Frame (Bed Mobility Goal 1, PT) short term goal (STG);3 days (P)   -ES     Progress/Outcomes (Bed Mobility Goal 1, PT) new goal (P)   -ES       Row Name 07/16/25 1601          Transfer Goal 1 (PT)    Activity/Assistive Device (Transfer Goal 1, PT) sit-to-stand/stand-to-sit;bed-to-chair/chair-to-bed (P)   -ES     [place] Level/Cues Needed (Transfer Goal 1, PT) minimum assist (75% or more patient effort) (P)   -ES     Time Frame (Transfer Goal 1, PT) long term goal (LTG);10 days (P)   -ES      Strategies/Barriers (Transfers Goal 1, PT) PHP (P)   -ES     Progress/Outcome (Transfer Goal 1, PT) new goal (P)   -ES       Row Name 07/16/25 1603          Gait Training Goal 1 (PT)    Activity/Assistive Device (Gait Training Goal 1, PT) gait (walking locomotion);assistive device use (P)   -ES     [place] Level (Gait Training Goal 1, PT) minimum assist (75% or more patient effort) (P)   -ES     Distance (Gait Training Goal 1, PT) 35' (P)   -ES     Time Frame (Gait Training Goal 1, PT) long term goal (LTG);10 days (P)   -ES     Progress/Outcome (Gait Training Goal 1, PT) new goal (P)   -ES       Row Name 07/16/25 1609          Therapy Assessment/Plan (PT)    Planned Therapy Interventions (PT) balance training;bed mobility training;gait training;home exercise program;motor coordination training;neuromuscular re-education;patient/family education;postural re-education;ROM (range of motion);strengthening;transfer training;wheelchair management/propulsion training (P)   -ES               User Key  (r) = Recorded By, (t) = Taken By, (c) = Cosigned By      Initials Name Provider Type    ES Sidra Corona, PT Student PT Student                   Clinical Impression       Row Name 07/16/25 3409          Pain    Pretreatment Pain Rating 6/10 (P)   -ES     Posttreatment Pain Rating 6/10 (P)   -ES     Pain Location hip (P)   -ES     Pain Side/Orientation right (P)   -ES     Pain Management Interventions exercise or physical activity utilized;movement retraining implemented;nursing notified;premedicated for activity;positioning techniques utilized (P)   -ES     Response to Pain Interventions activity participation with tolerable pain (P)   -ES     Pre/Posttreatment Pain Comment Pt reported increased pain with WB, however decreased with seated rest (P)   -ES       Row Name 07/16/25 1590          Plan of Care Review    Plan of Care Reviewed With patient (P)   -ES     Outcome Evaluation PT eval this session. Pt presents  below her functional baseline as req Rajesh for bed mobility, and maxAx2 for transfers and gait. Pt was educated on PHP as well as WB status of RLE. Pt presents with deficits in overall mobility, strength, endurance, balance and coordination indicating need for skilled IPPT services. PT rec SNF at d/c. (P)   -ES       Row Name 07/16/25 1603          Therapy Assessment/Plan (PT)    Patient/Family Therapy Goals Statement (PT) Go home (P)   -ES     Rehab Potential (PT) fair (P)   -ES     Criteria for Skilled Interventions Met (PT) yes;meets criteria;skilled treatment is necessary (P)   -ES     Therapy Frequency (PT) daily (P)   -ES     Predicted Duration of Therapy Intervention (PT) 8 (P)   -ES       Row Name 07/16/25 1603          Vital Signs    Pre Systolic BP Rehab 99 (P)   -ES     Pre Treatment Diastolic BP 44 (P)   -ES     Intra Systolic BP Rehab 113 (P)   -ES     Intra Treatment Diastolic BP 62 (P)   -ES     Post Systolic BP Rehab 114 (P)   -ES     Post Treatment Diastolic BP 55 (P)   -ES     Pretreatment Heart Rate (beats/min) 83 (P)   -ES     Intratreatment Heart Rate (beats/min) 90 (P)   -ES     Posttreatment Heart Rate (beats/min) 82 (P)   -ES     Pre SpO2 (%) 94 (P)   -ES     O2 Delivery Pre Treatment nasal cannula (P)   4LNC  -ES     O2 Delivery Intra Treatment nasal cannula (P)   -ES     Post SpO2 (%) 92 (P)   4LNC  -ES     O2 Delivery Post Treatment nasal cannula (P)   -ES     Pre Patient Position Supine (P)   -ES     Intra Patient Position Sitting (P)   -ES     Post Patient Position Sitting (P)   -ES       Row Name 07/16/25 1603          Positioning and Restraints    Pre-Treatment Position in bed (P)   -ES     Post Treatment Position chair (P)   -ES     In Chair sitting;reclined;legs elevated;heels elevated;on mechanical lift sling;waffle cushion;call light within reach;encouraged to call for assist;exit alarm on;ABD pillow;notified nsg (P)   -ES               User Key  (r) = Recorded By, (t) = Taken By,  (c) = Cosigned By      Initials Name Provider Type    Sidra Hampton, PT Student PT Student                   Outcome Measures       Row Name 07/16/25 1609 07/16/25 0805       How much help from another person do you currently need...    Turning from your back to your side while in flat bed without using bedrails? 3 (P)   -ES 2  -BC    Moving from lying on back to sitting on the side of a flat bed without bedrails? 3 (P)   -ES 2  -BC    Moving to and from a bed to a chair (including a wheelchair)? 2 (P)   -ES 2  -BC    Standing up from a chair using your arms (e.g., wheelchair, bedside chair)? 2 (P)   -ES 2  -BC    Climbing 3-5 steps with a railing? 1 (P)   -ES 1  -BC    To walk in hospital room? 2 (P)   -ES 1  -BC    AM-PAC 6 Clicks Score (PT) 13 (P)   -ES 10  -BC    Highest Level of Mobility Goal Move to Chair/Commode-4 (P)   -ES Move to Chair/Commode-4  -BC      Row Name 07/16/25 1609          PADD    Diagnosis 2 (P)   -ES     Gender 1 (P)   -ES     Age Group 0 (P)   -ES     Gait Distance 0 (P)   -ES     Assist Level 0 (P)   -ES     Home Support 3 (P)   -ES     PADD Score 6 (P)   -ES     Patient Preference extended rehabilitation (P)   -ES     Prediction by PADD Score extended rehabilitation (P)   -ES       Row Name 07/16/25 1609 07/16/25 1446       Functional Assessment    Outcome Measure Options AM-PAC 6 Clicks Basic Mobility (PT);PADD (P)   -ES AM-PAC 6 Clicks Daily Activity (OT)  -DEMARCUS              User Key  (r) = Recorded By, (t) = Taken By, (c) = Cosigned By      Initials Name Provider Type    Amanda Ya, RN Registered Nurse    Loree Banks, OT Occupational Therapist    Sidra Hampton, PT Student PT Student                                 Physical Therapy Education       Title: PT OT SLP Therapies (In Progress)       Topic: Physical Therapy (Done)       Point: Mobility training (Done)       Learning Progress Summary            Patient Acceptance, E,D, VU,NR by  at 7/16/2025 1608     Comment: Post Hip Precautions next 6wks  WBAT RLE    Acceptance, E, VU,NR by BC at 7/16/2025 0800                      Point: Home exercise program (Done)       Learning Progress Summary            Patient Acceptance, E,D, VU,NR by  at 7/16/2025 1609    Comment: Post Hip Precautions next 6wks  WBAT RLE    Acceptance, E, VU,NR by BC at 7/16/2025 0800                      Point: Body mechanics (Done)       Learning Progress Summary            Patient Acceptance, E,D, VU,NR by  at 7/16/2025 1609    Comment: Post Hip Precautions next 6wks  WBAT RLE    Acceptance, E, VU,NR by BC at 7/16/2025 0800                      Point: Precautions (Done)       Learning Progress Summary            Patient Acceptance, E,D, VU,NR by  at 7/16/2025 1609    Comment: Post Hip Precautions next 6wks  WBAT RLE    Acceptance, E, VU,NR by BC at 7/16/2025 0800                                      User Key       Initials Effective Dates Name Provider Type Discipline    BC 04/27/21 -  Amanda Garay, RN Registered Nurse Nurse     05/05/25 -  Sidra Corona, PT Student PT Student PT                  PT Recommendation and Plan  Planned Therapy Interventions (PT): (P) balance training, bed mobility training, gait training, home exercise program, motor coordination training, neuromuscular re-education, patient/family education, postural re-education, ROM (range of motion), strengthening, transfer training, wheelchair management/propulsion training  Outcome Evaluation: (P) PT eval this session. Pt presents below her functional baseline as req Rajesh for bed mobility, and maxAx2 for transfers and gait. Pt was educated on PHP as well as WB status of RLE. Pt presents with deficits in overall mobility, strength, endurance, balance and coordination indicating need for skilled IPPT services. PT rec SNF at d/c.     Time Calculation:   PT Evaluation Complexity  History, PT Evaluation Complexity: (P) 3 or more personal factors and/or  comorbidities  Examination of Body Systems (PT Eval Complexity): (P) total of 4 or more elements  Clinical Presentation (PT Evaluation Complexity): (P) stable  Clinical Decision Making (PT Evaluation Complexity): (P) low complexity  Overall Complexity (PT Evaluation Complexity): (P) low complexity     PT Charges       Row Name 07/16/25 1610             Time Calculation    Start Time 1331 (P)   -ES      PT Received On 07/16/25 (P)   -ES      PT Goal Re-Cert Due Date 07/26/25 (P)   -ES         Untimed Charges    PT Eval/Re-eval Minutes 64 (P)   -ES         Total Minutes    Untimed Charges Total Minutes 64 (P)   -ES       Total Minutes 64 (P)   -ES                User Key  (r) = Recorded By, (t) = Taken By, (c) = Cosigned By      Initials Name Provider Type    Sidra Hampton, PT Student PT Student                  Therapy Charges for Today       Code Description Service Date Service Provider Modifiers Qty    [id number] HC PT EVAL LOW COMPLEXITY 5 7/16/2025 Sidra Corona, PT Student  1            PT G-Codes  Outcome Measure Options: (P) AM-PAC 6 Clicks Basic Mobility (PT), PADD  AM-PAC 6 Clicks Score (PT): (P) 13  AM-PAC 6 Clicks Score (OT): 12  PT Discharge Summary  Anticipated Discharge Disposition (PT): (P) skilled nursing facility    Sidra Corona, PT Student  7/16/2025

## 2025-07-16 NOTE — PLAN OF CARE
Goal Outcome Evaluation:  Plan of Care Reviewed With: patient           Outcome Evaluation: Alert oriented x4. Forgetful at times. Ambulates max assist x2. Pain controlled with PO pain medication. Bladder scan, external catheter.

## 2025-07-16 NOTE — PLAN OF CARE
Goal Outcome Evaluation:  Plan of Care Reviewed With: (P) patient           Outcome Evaluation: (P) PT eval this session. Pt presents below her functional baseline as req Rajesh for bed mobility, and maxAx2 for transfers and gait. Pt was educated on PHP as well as WB status of RLE. Pt presents with deficits in overall mobility, strength, endurance, balance and coordination indicating need for skilled IPPT services. PT rec SNF at d/c.    Anticipated Discharge Disposition (PT): (P) skilled nursing facility

## 2025-07-16 NOTE — NURSING NOTE
WOC consult for:  Area noted prior to patient positioning on OR table.   Question Answer Comment   Reason for Consult? Pressure Injury    Was Pressure Injury Present on Admission? Yes    Indicate Wound Location / Details Sacral area         Visited patient in chair, mele don side and assessed under allevyn dressing.    Noted very thin linear purple discoloration to sacral area, no open wounds. This appears to be ITD, irritation in this crease. Recommend continue allevyn, will sign off.

## 2025-07-16 NOTE — PROGRESS NOTES
Nutrition Services    Patient Name:  Chula Chen  YOB: 1931  MRN: [id number]  Admit Date:  7/15/2025    Pt screened per protocol for potential pressure injury. WOC consult pending. RD will complete nutrition assessment with identification of Stg II PI or greater. Will continue to monitor per protocol. Please consult with urgent nutrition related needs. Thanks.       Electronically signed by:  Amber Bueno MS,STEPHANIE,ABA  07/16/25 08:23 EDT

## 2025-07-16 NOTE — PROGRESS NOTES
[facility]    Acute pain service Inpatient Progress Note    Patient Name: Chula Chen  :  1931  MRN:  [id number]        Acute Pain  Service Inpatient Progress Note:    Analgesia:Fair  Pain Score:6/10  LOC: alert and awake  Resp Status: room air  Cardiac: VS stable  Side Effects:None  Catheter Site:clean, dressing intact and dry  Cath type: peripheral nerve cath(InfuSystem)  Volume: 1mL,8ml, 8ml InfuSystem Pump.  Dosing/Volume: ropivacaine 0.2%  Catheter Plan:Catheter to remain Insitu, Continue catheter infusion rate unchanged and Bolus Catheter  Comments:

## 2025-07-16 NOTE — PROGRESS NOTES
[facility] Medicine Services  PROGRESS NOTE    Patient Name: Chula Chen  : 1931  MRN: [id number]    Date of Admission: 7/15/2025  Primary Care Physician: Provider, No Known    Subjective   Subjective     CC:  Fall, right hip fracture    HPI:  Resting in bed in no acute distress and overall feels okay.  Tells me that she is still sore all over.  No fever or chills.  No chest pain palpitation shortness of breath.  No nausea vomiting or diarrhea      Objective   Objective     Vital Signs:   Temp:  [97 °F (36.1 °C)-98.5 °F (36.9 °C)] 97.6 °F (36.4 °C)  Heart Rate:  [] 82  Resp:  [16] 16  BP: ()/(30-63) 110/63  Flow (L/min) (Oxygen Therapy):  [2-4] 4     Physical Exam:  Constitutional: No acute distress, awake, alert  HENT: NCAT, mucous membranes moist  Respiratory: Clear to auscultation bilaterally, respiratory effort normal   Cardiovascular: RRR, no murmurs, rubs, or gallops  Gastrointestinal: Positive bowel sounds, soft, nontender, nondistended  Musculoskeletal: No bilateral ankle edema  Psychiatric: Flat affect, cooperative  Neurologic: Awake, alert, follows commands, speech is clear    Results Reviewed:  LAB RESULTS:      Lab 25  0836 07/15/25  0248   WBC 11.99* 11.97*   HEMOGLOBIN 10.2* 13.3   HEMATOCRIT 31.1* 40.2   PLATELETS 319 432   NEUTROS ABS 9.31* 8.06*   IMMATURE GRANS (ABS) 0.04 0.05   LYMPHS ABS 1.63 2.69   MONOS ABS 0.86 0.93*   EOS ABS 0.12 0.21   MCV 83.8 83.4         Lab 25  0836 07/15/25  1105 07/15/25  0248   SODIUM 138  --  134*   POTASSIUM 3.7  --  3.8   CHLORIDE 105  --  100   CO2 22.1  --  22.0   ANION GAP 10.9  --  12.0   BUN 9.9  --  18.5   CREATININE 0.53*  --  0.49*   EGFR 85.8  --  87.5   GLUCOSE 170*  --  125*   CALCIUM 8.0*  --  9.2   MAGNESIUM  --  2.0  --    PHOSPHORUS  --  2.5  --          Lab 07/15/25  0248   TOTAL PROTEIN 6.9   ALBUMIN 3.3*   GLOBULIN 3.6   ALT (SGPT) 87*   AST (SGOT) 36*   BILIRUBIN 0.2   ALK PHOS  152*                     Brief Urine Lab Results  (Last result in the past 365 days)        Color   Clarity   Blood   Leuk Est   Nitrite   Protein   CREAT   Urine HCG        07/15/25 0958 Yellow   Turbid   Moderate (2+)   Large (3+)   Negative   30 mg/dL (1+)                   Microbiology Results Abnormal       Procedure Component Value - Date/Time    Urine Culture - Urine, Straight Cath [[id number]]  (Abnormal) Collected: 07/15/25 0958    Lab Status: Preliminary result Specimen: Urine from Straight Cath Updated: 07/16/25 1152     Urine Culture >100,000 CFU/mL Escherichia coli    Narrative:      Colonization of the urinary tract without infection is common. Treatment is discouraged unless the patient is symptomatic, pregnant, or undergoing an invasive urologic procedure.            XR Hip With or Without Pelvis 2 - 3 View Right  Result Date: 7/15/2025  XR HIP W OR WO PELVIS 2-3 VIEW RIGHT Date of Exam: 7/15/2025 6:50 PM EDT Indication: Post-Op Hip Arthroplasty Comparison: Hip and pelvis radiographs 7/15/2025 Findings: Interval right hip arthroplasty without radiographic evidence of immediate complication. Surrounding soft tissue edema and subcutaneous emphysema, favored postoperative. Degenerative changes of the sacroiliac joints, left hip, and pubic symphysis appear unchanged.     Impression: Impression: Interval right hip arthroplasty without radiographic evidence of immediate complication. Electronically Signed: Andrez Martinez MD  7/15/2025 7:12 PM EDT  Workstation ID: [id number]    RIGHt FI cath  Result Date: 7/15/2025  Oren Wiggins CRNA     7/15/2025  1:22 PM RIGHt FI cath Patient reassessed immediately prior to procedure Reason for block: at surgeon's request and post-op pain management Performed by CRNA/CAA: Oren Wiggins CRNA Assisted by: Genevieve Floyd RN Preanesthetic Checklist Completed: patient identified, IV checked, site marked, risks and benefits discussed, surgical consent, monitors and equipment  checked, pre-op evaluation and timeout performed Prep: Pt Position: supine Sterile barriers:cap, gloves, mask and washed/disinfected hands Prep: ChloraPrep Patient monitoring: blood pressure monitoring, continuous pulse oximetry and EKG Procedure Sedation: no Performed under: local infiltration Guidance:ultrasound guided ULTRASOUND INTERPRETATION.  Using ultrasound guidance a 20 G gauge needle was placed in close proximity to the nerve, at which point, under ultrasound guidance anesthetic was injected in the area of the nerve and spread of the anesthesia was seen on ultrasound in close proximity thereto.  There were no abnormalities seen on ultrasound; a digital image was taken; and the patient tolerated the procedure with no complications. Images:still images obtained, printed/placed on chart Laterality:right Block Type:fascia iliaca compartment Injection Technique:catheter Needle Type:echogenic and Tuohy Needle Gauge:18 G Resistance on Injection: none Catheter Size:20 G (20g) Cath Depth at skin: 11 cm Post Assessment Injection Assessment: negative aspiration for heme, no paresthesia on injection and incremental injection Patient Tolerance:comfortable throughout block Complications:no Additional Notes CKAFASCIAILIACA: CATHETER A high-frequency linear transducer, with sterile cover, was placed in parasagittal plane on top of the Anterior Superior Iliac Spine (ASIS) and moved medially to identify the Internal Oblique muscle, Sartorius muscle, Iliacus Muscle, Fascia Iliaca (FI) and Fascia Latae. The insertion site was prepped and draped in sterile fashion. Skin and cutaneous tissue was infiltrated with 2-5 ml of 1% Lidocaine. Using ultrasound-guidance, an 18-gauge Contiplex Ultra 360 Touhy needle was advanced in plane from caudad to cephalad. Preservative-free normal saline was utilized for hydro-dissection of tissue, advancement of Touhy, and to confirm final needle placement below FI. Local anesthetic in incremental  "3-5 ml injections. Aspiration every 5 ml to prevent intravascular injection. Injection was completed with negative aspiration of blood and negative intravascular injection. Injection pressures were normal with minimal resistance. A 20-gauge Contiplex Echo catheter was placed through the needle and advance out the tip of the Touhy 3-5 cm. The Touhy needle was then removed, and final catheter position verified below the FI. The catheter was secured in the usual fashion with skin glue, benzoin, steri-strips, CHG tegaderm and Label noting \"Nerve Block Catheter\". Jerk tape applied at yellow connector and catheter connection. Performed by: Oren Wiggins CRNA     CT Head Without Contrast  Result Date: 7/15/2025  CT HEAD WO CONTRAST Date of Exam: 7/15/2025 1:55 AM EDT Indication: head injury. Comparison: None available. Technique: Axial CT images were obtained of the head without contrast administration.  Automated exposure control and iterative construction methods were used. Findings: There is mild diffuse generalized atrophy. There are low-attenuation areas in the periventricular white matter consistent with chronic microvascular ischemic change. There is no mass, mass effect or midline shift. There are no abnormal extra-axial fluid collections or areas of acute hemorrhage. There is mild left maxillary sinus disease. The mastoid air cells are clear. There is a skin laceration in the right lateral frontal region with no radiopaque foreign body or underlying bony abnormality.     Impression: Impression: Atrophy and chronic microvascular ischemic change. No acute intracranial process. Electronically Signed: Dav Reed MD  7/15/2025 2:41 AM EDT  Workstation ID: [id number]    XR Hip With or Without Pelvis 2 - 3 View Right  Result Date: 7/15/2025  XR HIP W OR WO PELVIS 2-3 VIEW RIGHT Date of Exam: 7/15/2025 1:40 AM EDT Indication: hip injury Comparison: None available. Findings: There is an abnormal appearance at the " subcapital portion of the right femur suspected to represent a fracture. There is bilateral hip arthritic change. There are no lytic or destructive bony lesions.     Impression: Impression: Suspected subcapital right femoral neck fracture. Electronically Signed: Dav Reed MD  7/15/2025 2:23 AM EDT  Workstation ID: [id number]          Current medications:  Scheduled Meds:cefTRIAXone, 1,000 mg, Intravenous, Q24H  citalopram, 20 mg, Oral, Daily  docusate sodium, 100 mg, Oral, BID  dorzolamide, 1 drop, Both Eyes, Daily  enoxaparin sodium, 30 mg, Subcutaneous, Daily  fluticasone, 2 spray, Nasal, Daily  midodrine, 5 mg, Oral, TID AC  primidone, 50 mg, Oral, TID  sodium chloride, 10 mL, Intravenous, Q12H  sodium chloride, 3 mL, Intravenous, Q12H  timolol, 1 drop, Both Eyes, Daily      Continuous Infusions:lactated ringers, 9 mL/hr  ropivacaine,       PRN Meds:.  acetaminophen **OR** acetaminophen **OR** acetaminophen    calcium carbonate    Calcium Replacement - Follow Nurse / BPA Driven Protocol    clonazePAM    docusate sodium    Magnesium Standard Dose Replacement - Follow Nurse / BPA Driven Protocol    Morphine **AND** naloxone    ondansetron ODT **OR** ondansetron    Phosphorus Replacement - Follow Nurse / BPA Driven Protocol    Potassium Replacement - Follow Nurse / BPA Driven Protocol    sodium chloride    sodium chloride    sodium chloride    sodium chloride    traMADol    Assessment & Plan   Assessment & Plan     Active Hospital Problems    Diagnosis  POA    **Hip fracture [S72.009A]  Yes      Resolved Hospital Problems   No resolved problems to display.        Brief Hospital Course to date:  Chula Chen is a 89 y.o. female  with past medical history significant for hypertension, essential tremors, history of COVID, and assisted living facility resident.  Evidently patient was in her usual state of health until earlier this morning when she woke up and tried to go to the bathroom using her walker.  In the  bathroom evidently she lost the control of the walker and had a fall.      Status post fall and right hip fracture  - X-ray of the right hip shows subcapital right femoral neck fracture  - S/p surgery by Dr. rAellano with no immediate complications.    Low blood pressure  -We gave 500 cc bolus of normal saline.  Will also start the patient on midodrine to prevent very low pressure.  Will monitor closely.     Difficulty with urination  - UA shows large leukocyte Estrace  - Urine culture growing E. Coli, we will start the patient on Rocephin     Depression  - Patient is on Celexa and also clonazepam at home.  Will continue the home medication and monitor     Essential tremors  - Patient is on primidone at home which we will continue.         Expected Discharge Location and Transportation: Most likely rehab  Expected Discharge to be determined  Expected Discharge Date: 7/18/2025; Expected Discharge Time:      VTE Prophylaxis:  Pharmacologic & mechanical VTE prophylaxis orders are present.         AM-PAC 6 Clicks Score (PT): 13 (07/16/25 3426)    CODE STATUS:   Code Status and Medical Interventions: CPR (Attempt to Resuscitate); Full Support   Ordered at: 07/15/25 1947     Code Status (Patient has no pulse and is not breathing):    CPR (Attempt to Resuscitate)     Medical Interventions (Patient has pulse or is breathing):    Full Support     Level Of Support Discussed With:    Patient       Willie Acuna MD  07/16/25

## 2025-07-16 NOTE — PLAN OF CARE
Problem: Adult Inpatient Plan of Care  Goal: Absence of Hospital-Acquired Illness or Injury  Outcome: Progressing  Intervention: Identify and Manage Fall Risk  Recent Flowsheet Documentation  Taken 7/16/2025 0400 by Theresa Whitney RN  Safety Promotion/Fall Prevention: safety round/check completed  Taken 7/15/2025 2331 by Theresa Whitney RN  Safety Promotion/Fall Prevention: safety round/check completed  Taken 7/15/2025 2000 by Theresa Whitney RN  Safety Promotion/Fall Prevention:   assistive device/personal items within reach   clutter free environment maintained   nonskid shoes/slippers when out of bed   room organization consistent   safety round/check completed  Intervention: Prevent Skin Injury  Recent Flowsheet Documentation  Taken 7/16/2025 0400 by Theresa Whitney RN  Body Position: position maintained  Skin Protection: incontinence pads utilized  Taken 7/15/2025 2331 by Theresa Whitney RN  Body Position: position maintained  Skin Protection: incontinence pads utilized  Taken 7/15/2025 2000 by Theresa Whitney RN  Body Position: position maintained  Skin Protection: incontinence pads utilized  Intervention: Prevent and Manage VTE (Venous Thromboembolism) Risk  Recent Flowsheet Documentation  Taken 7/16/2025 0400 by Theresa Whitney RN  VTE Prevention/Management:   left   SCDs (sequential compression devices) on  Taken 7/15/2025 2331 by Theresa Whitney RN  VTE Prevention/Management:   left   SCDs (sequential compression devices) on  Taken 7/15/2025 2000 by Theresa Whitney RN  VTE Prevention/Management:   left   SCDs (sequential compression devices) on  Intervention: Prevent Infection  Recent Flowsheet Documentation  Taken 7/15/2025 2000 by Theresa Whitney RN  Infection Prevention:   environmental surveillance performed   rest/sleep promoted   single patient room provided   Goal Outcome Evaluation:

## 2025-07-17 ENCOUNTER — APPOINTMENT (OUTPATIENT)
Dept: GENERAL RADIOLOGY | Facility: HOSPITAL | Age: OVER 89
DRG: 522 | End: 2025-07-17
Payer: MEDICARE

## 2025-07-17 ENCOUNTER — APPOINTMENT (OUTPATIENT)
Dept: CARDIOLOGY | Facility: HOSPITAL | Age: OVER 89
DRG: 522 | End: 2025-07-17
Payer: MEDICARE

## 2025-07-17 LAB
AORTIC DIMENSIONLESS INDEX: 0.44 (DI)
AV MEAN PRESS GRAD SYS DOP V1V2: 9 MMHG
AV VMAX SYS DOP: 205 CM/SEC
BACTERIA SPEC AEROBE CULT: ABNORMAL
BASOPHILS # BLD AUTO: 0.03 10*3/MM3 (ref 0–0.2)
BASOPHILS NFR BLD AUTO: 0.2 % (ref 0–1.5)
BH CV ECHO MEAS - AO MAX PG: 16.8 MMHG
BH CV ECHO MEAS - AO ROOT DIAM: 3 CM
BH CV ECHO MEAS - AO V2 VTI: 44.8 CM
BH CV ECHO MEAS - AVA(I,D): 1.23 CM2
BH CV ECHO MEAS - EDV(CUBED): 24.4 ML
BH CV ECHO MEAS - EDV(MOD-SP4): 43.3 ML
BH CV ECHO MEAS - EF(MOD-SP4): 60.3 %
BH CV ECHO MEAS - ESV(CUBED): 6.9 ML
BH CV ECHO MEAS - ESV(MOD-SP4): 17.2 ML
BH CV ECHO MEAS - FS: 34.5 %
BH CV ECHO MEAS - IVS/LVPW: 1 CM
BH CV ECHO MEAS - IVSD: 1.1 CM
BH CV ECHO MEAS - LA DIMENSION: 3.2 CM
BH CV ECHO MEAS - LAT PEAK E' VEL: 8.2 CM/SEC
BH CV ECHO MEAS - LV DIASTOLIC VOL/BSA (35-75): 30.6 CM2
BH CV ECHO MEAS - LV MASS(C)D: 90.7 GRAMS
BH CV ECHO MEAS - LV MAX PG: 2.8 MMHG
BH CV ECHO MEAS - LV MEAN PG: 2 MMHG
BH CV ECHO MEAS - LV SYSTOLIC VOL/BSA (12-30): 12.2 CM2
BH CV ECHO MEAS - LV V1 MAX: 83.3 CM/SEC
BH CV ECHO MEAS - LV V1 VTI: 19.5 CM
BH CV ECHO MEAS - LVIDD: 2.9 CM
BH CV ECHO MEAS - LVIDS: 1.9 CM
BH CV ECHO MEAS - LVOT AREA: 2.8 CM2
BH CV ECHO MEAS - LVOT DIAM: 1.9 CM
BH CV ECHO MEAS - LVPWD: 1.1 CM
BH CV ECHO MEAS - MED PEAK E' VEL: 7.2 CM/SEC
BH CV ECHO MEAS - MV A MAX VEL: 75.5 CM/SEC
BH CV ECHO MEAS - MV DEC SLOPE: 387.5 CM/SEC2
BH CV ECHO MEAS - MV DEC TIME: 0.2 SEC
BH CV ECHO MEAS - MV E MAX VEL: 68.1 CM/SEC
BH CV ECHO MEAS - MV E/A: 0.9
BH CV ECHO MEAS - MV MAX PG: 3.3 MMHG
BH CV ECHO MEAS - MV MEAN PG: 2 MMHG
BH CV ECHO MEAS - MV P1/2T: 64.2 MSEC
BH CV ECHO MEAS - MV V2 VTI: 22.6 CM
BH CV ECHO MEAS - MVA(P1/2T): 3.4 CM2
BH CV ECHO MEAS - MVA(VTI): 2.45 CM2
BH CV ECHO MEAS - PA ACC TIME: 0.06 SEC
BH CV ECHO MEAS - RAP SYSTOLE: 3 MMHG
BH CV ECHO MEAS - RVSP: 22 MMHG
BH CV ECHO MEAS - SV(LVOT): 55.3 ML
BH CV ECHO MEAS - SV(MOD-SP4): 26.1 ML
BH CV ECHO MEAS - SVI(LVOT): 39.1 ML/M2
BH CV ECHO MEAS - SVI(MOD-SP4): 18.5 ML/M2
BH CV ECHO MEAS - TAPSE (>1.6): 1.51 CM
BH CV ECHO MEAS - TR MAX PG: 18.8 MMHG
BH CV ECHO MEAS - TR MAX VEL: 217 CM/SEC
BH CV ECHO MEASUREMENTS AVERAGE E/E' RATIO: 8.84
BH CV XLRA - RV BASE: 2.9 CM
BH CV XLRA - RV LENGTH: 5.2 CM
BH CV XLRA - RV MID: 2.8 CM
BH CV XLRA - TDI S': 11.9 CM/SEC
DEPRECATED RDW RBC AUTO: 46.1 FL (ref 37–54)
EOSINOPHIL # BLD AUTO: 0.2 10*3/MM3 (ref 0–0.4)
EOSINOPHIL NFR BLD AUTO: 1.4 % (ref 0.3–6.2)
ERYTHROCYTE [DISTWIDTH] IN BLOOD BY AUTOMATED COUNT: 14.7 % (ref 12.3–15.4)
HCT VFR BLD AUTO: 28.9 % (ref 34–46.6)
HGB BLD-MCNC: 9.5 G/DL (ref 12–15.9)
IMM GRANULOCYTES # BLD AUTO: 0.07 10*3/MM3 (ref 0–0.05)
IMM GRANULOCYTES NFR BLD AUTO: 0.5 % (ref 0–0.5)
IVRT: 116 MS
LYMPHOCYTES # BLD AUTO: 3.07 10*3/MM3 (ref 0.7–3.1)
LYMPHOCYTES NFR BLD AUTO: 21.7 % (ref 19.6–45.3)
MCH RBC QN AUTO: 28.2 PG (ref 26.6–33)
MCHC RBC AUTO-ENTMCNC: 32.9 G/DL (ref 31.5–35.7)
MCV RBC AUTO: 85.8 FL (ref 79–97)
MONOCYTES # BLD AUTO: 1.69 10*3/MM3 (ref 0.1–0.9)
MONOCYTES NFR BLD AUTO: 11.9 % (ref 5–12)
NEUTROPHILS NFR BLD AUTO: 64.3 % (ref 42.7–76)
NEUTROPHILS NFR BLD AUTO: 9.09 10*3/MM3 (ref 1.7–7)
NRBC BLD AUTO-RTO: 0 /100 WBC (ref 0–0.2)
PLATELET # BLD AUTO: 308 10*3/MM3 (ref 140–450)
PMV BLD AUTO: 10.2 FL (ref 6–12)
RBC # BLD AUTO: 3.37 10*6/MM3 (ref 3.77–5.28)
WBC NRBC COR # BLD AUTO: 14.15 10*3/MM3 (ref 3.4–10.8)

## 2025-07-17 PROCEDURE — 25010000002 CEFTRIAXONE PER 250 MG: Performed by: INTERNAL MEDICINE

## 2025-07-17 PROCEDURE — 25010000002 ENOXAPARIN PER 10 MG: Performed by: ORTHOPAEDIC SURGERY

## 2025-07-17 PROCEDURE — 97110 THERAPEUTIC EXERCISES: CPT

## 2025-07-17 PROCEDURE — 93306 TTE W/DOPPLER COMPLETE: CPT

## 2025-07-17 PROCEDURE — 99233 SBSQ HOSP IP/OBS HIGH 50: CPT | Performed by: INTERNAL MEDICINE

## 2025-07-17 PROCEDURE — 71045 X-RAY EXAM CHEST 1 VIEW: CPT

## 2025-07-17 PROCEDURE — 97530 THERAPEUTIC ACTIVITIES: CPT

## 2025-07-17 PROCEDURE — 25810000003 SODIUM CHLORIDE 0.9 % SOLUTION: Performed by: INTERNAL MEDICINE

## 2025-07-17 PROCEDURE — 93306 TTE W/DOPPLER COMPLETE: CPT | Performed by: INTERNAL MEDICINE

## 2025-07-17 PROCEDURE — 85025 COMPLETE CBC W/AUTO DIFF WBC: CPT | Performed by: INTERNAL MEDICINE

## 2025-07-17 PROCEDURE — 25010000002 DOXYCYCLINE 100 MG RECONSTITUTED SOLUTION 1 EACH VIAL: Performed by: INTERNAL MEDICINE

## 2025-07-17 PROCEDURE — 99024 POSTOP FOLLOW-UP VISIT: CPT | Performed by: ORTHOPAEDIC SURGERY

## 2025-07-17 RX ADMIN — Medication 3 ML: at 08:14

## 2025-07-17 RX ADMIN — CITALOPRAM HYDROBROMIDE 20 MG: 20 TABLET ORAL at 08:13

## 2025-07-17 RX ADMIN — SODIUM CHLORIDE 1000 MG: 900 INJECTION INTRAVENOUS at 13:29

## 2025-07-17 RX ADMIN — MIDODRINE HYDROCHLORIDE 5 MG: 5 TABLET ORAL at 16:52

## 2025-07-17 RX ADMIN — Medication 10 ML: at 21:04

## 2025-07-17 RX ADMIN — SODIUM CHLORIDE 75 ML/HR: 9 INJECTION, SOLUTION INTRAVENOUS at 08:14

## 2025-07-17 RX ADMIN — DORZOLAMIDE HCL 1 DROP: 20 SOLUTION/ DROPS OPHTHALMIC at 08:13

## 2025-07-17 RX ADMIN — Medication 3 ML: at 21:05

## 2025-07-17 RX ADMIN — PRIMIDONE 50 MG: 50 TABLET ORAL at 16:52

## 2025-07-17 RX ADMIN — PRIMIDONE 50 MG: 50 TABLET ORAL at 21:04

## 2025-07-17 RX ADMIN — DOXYCYCLINE 100 MG: 100 INJECTION, POWDER, LYOPHILIZED, FOR SOLUTION INTRAVENOUS at 17:56

## 2025-07-17 RX ADMIN — DOCUSATE SODIUM 100 MG: 100 CAPSULE, LIQUID FILLED ORAL at 21:04

## 2025-07-17 RX ADMIN — FLUTICASONE PROPIONATE 2 SPRAY: 50 SPRAY, METERED NASAL at 08:14

## 2025-07-17 RX ADMIN — DOCUSATE SODIUM 100 MG: 100 CAPSULE, LIQUID FILLED ORAL at 08:13

## 2025-07-17 RX ADMIN — ENOXAPARIN SODIUM 30 MG: 100 INJECTION SUBCUTANEOUS at 08:13

## 2025-07-17 RX ADMIN — TIMOLOL MALEATE 1 DROP: 5 SOLUTION OPHTHALMIC at 08:13

## 2025-07-17 RX ADMIN — MIDODRINE HYDROCHLORIDE 5 MG: 5 TABLET ORAL at 08:13

## 2025-07-17 RX ADMIN — Medication 10 ML: at 08:14

## 2025-07-17 RX ADMIN — MIDODRINE HYDROCHLORIDE 5 MG: 5 TABLET ORAL at 11:39

## 2025-07-17 RX ADMIN — PRIMIDONE 50 MG: 50 TABLET ORAL at 08:13

## 2025-07-17 NOTE — PLAN OF CARE
Goal Outcome Evaluation:      A/Ox4, forgetful at times. Up with assistance x2. Up in chair today. External catheter in place. VSS. Plan of care ongoing

## 2025-07-17 NOTE — PROGRESS NOTES
Nutrition Services    Patient Name:  Chula Chen  YOB: 1931  MRN: [id number]  Admit Date:  7/15/2025    Patient screened for possible pressure injury. Chart reviewed. No pressure injury stage 2 or greater noted per documentation at this time. RDN following per protocol. Available via consult.     Electronically signed by:  Amber Bueno MS,STEPHANIE,ABA  07/17/25 08:22 EDT

## 2025-07-17 NOTE — CASE MANAGEMENT/SOCIAL WORK
Continued Stay Note   [place]     Patient Name: Chula Chen  MRN: [id number]  Today's Date: 7/17/2025    Admit Date: 7/15/2025    Plan: [facility]   Discharge Plan       Row Name 07/17/25 1253       Plan    Plan [facility]    Patient/Family in Agreement with Plan yes    Plan Comments CM spoke with patient's daughter at bedside regarding Dc planning. DCP is to return back to [facility] when she is medically ready. william Montesinos following - patient will come back to facility for skilled rehab. CM following.    Final Discharge Disposition Code 03 - skilled nursing facility (SNF)                   Discharge Codes    No documentation.                 Expected Discharge Date and Time       Expected Discharge Date Expected Discharge Time    Jul 18, 2025               Tamy Gomes RN

## 2025-07-17 NOTE — PROGRESS NOTES
"          Orthopaedic Surgery Hip Fracture Post-Op Progress Note      LOS: 2 days   Patient Care Team:  Provider, No Known as PCP - General    POD 2    Subjective     Interval History:   Patient was able to sit in a chair yesterday.  Took a few steps only.  She cannot remember whether or not she had a bowel movement yesterday.  She is concerned about getting her eyedrops for glaucoma at the appropriate time.    Objective     Vital Signs:  Temp (24hrs), Av °F (36.7 °C), Min:97.6 °F (36.4 °C), Max:98.5 °F (36.9 °C)    /60 (BP Location: Right arm, Patient Position: Lying)   Pulse 85   Temp 98.2 °F (36.8 °C) (Oral)   Resp 16   Ht 149.9 cm (59\")   Wt 48.5 kg (107 lb)   SpO2 97%   BMI 21.61 kg/m²     Labs:  Lab Results (last 24 hours)       Procedure Component Value Units Date/Time    CBC & Differential [[id number]]  (Abnormal) Collected: 25    Specimen: Blood Updated: 25    Narrative:      The following orders were created for panel order CBC & Differential.  Procedure                               Abnormality         Status                     ---------                               -----------         ------                     CBC Auto Differential[[id number]]        Abnormal            Final result                 Please view results for these tests on the individual orders.    CBC Auto Differential [[id number]]  (Abnormal) Collected: 25    Specimen: Blood Updated: 25     WBC 14.15 10*3/mm3      RBC 3.37 10*6/mm3      Hemoglobin 9.5 g/dL      Hematocrit 28.9 %      MCV 85.8 fL      MCH 28.2 pg      MCHC 32.9 g/dL      RDW 14.7 %      RDW-SD 46.1 fl      MPV 10.2 fL      Platelets 308 10*3/mm3      Neutrophil % 64.3 %      Lymphocyte % 21.7 %      Monocyte % 11.9 %      Eosinophil % 1.4 %      Basophil % 0.2 %      Immature Grans % 0.5 %      Neutrophils, Absolute 9.09 10*3/mm3      Lymphocytes, Absolute 3.07 10*3/mm3      Monocytes, Absolute 1.69 10*3/mm3      " Eosinophils, Absolute 0.20 10*3/mm3      Basophils, Absolute 0.03 10*3/mm3      Immature Grans, Absolute 0.07 10*3/mm3      nRBC 0.0 /100 WBC     Urine Culture - Urine, Straight Cath [[id number]]  (Abnormal) Collected: 07/15/25 0958    Specimen: Urine from Straight Cath Updated: 07/16/25 1152     Urine Culture >100,000 CFU/mL Escherichia coli    Narrative:      Colonization of the urinary tract without infection is common. Treatment is discouraged unless the patient is symptomatic, pregnant, or undergoing an invasive urologic procedure.    Basic Metabolic Panel [[id number]]  (Abnormal) Collected: 07/16/25 0836    Specimen: Blood Updated: 07/16/25 1011     Glucose 170 mg/dL      BUN 9.9 mg/dL      Creatinine 0.53 mg/dL      Sodium 138 mmol/L      Potassium 3.7 mmol/L      Chloride 105 mmol/L      CO2 22.1 mmol/L      Calcium 8.0 mg/dL      BUN/Creatinine Ratio 18.7     Anion Gap 10.9 mmol/L      eGFR 85.8 mL/min/1.73     Narrative:      GFR Categories in Chronic Kidney Disease (CKD)              GFR Category          GFR (mL/min/1.73)    Interpretation  G1                    90 or greater        Normal or high (1)  G2                    60-89                Mild decrease (1)  G3a                   45-59                Mild to moderate decrease  G3b                   30-44                Moderate to severe decrease  G4                    15-29                Severe decrease  G5                    14 or less           Kidney failure    (1)In the absence of evidence of kidney disease, neither GFR category G1 or G2 fulfill the criteria for CKD.    eGFR calculation 2021 CKD-EPI creatinine equation, which does not include race as a factor    CBC & Differential [[id number]]  (Abnormal) Collected: 07/16/25 0836    Specimen: Blood Updated: 07/16/25 1006    Narrative:      The following orders were created for panel order CBC & Differential.  Procedure                               Abnormality         Status                      ---------                               -----------         ------                     CBC Auto Differential[[id number]]        Abnormal            Final result                 Please view results for these tests on the individual orders.    CBC Auto Differential [[id number]]  (Abnormal) Collected: 07/16/25 0836    Specimen: Blood Updated: 07/16/25 1006     WBC 11.99 10*3/mm3      RBC 3.71 10*6/mm3      Hemoglobin 10.2 g/dL      Hematocrit 31.1 %      MCV 83.8 fL      MCH 27.5 pg      MCHC 32.8 g/dL      RDW 14.5 %      RDW-SD 44.0 fl      MPV 9.9 fL      Platelets 319 10*3/mm3      Neutrophil % 77.6 %      Lymphocyte % 13.6 %      Monocyte % 7.2 %      Eosinophil % 1.0 %      Basophil % 0.3 %      Immature Grans % 0.3 %      Neutrophils, Absolute 9.31 10*3/mm3      Lymphocytes, Absolute 1.63 10*3/mm3      Monocytes, Absolute 0.86 10*3/mm3      Eosinophils, Absolute 0.12 10*3/mm3      Basophils, Absolute 0.03 10*3/mm3      Immature Grans, Absolute 0.04 10*3/mm3      nRBC 0.0 /100 WBC             Physical Exam:  EHL, FHL, gastroc soleus, and tibialis anterior are intact  Toes are pink and warm  Surgical dressing is in place  Calf is soft and nontender  No pain with gentle ROM of the hip    Assessment & Plan     Postoperative day number 2 status post cemented right bipolar hemiarthroplasty for displaced femoral neck fracture.    Labs: Hematocrit 30, platelets 308,000, creatinine 0.53    Pain Control: Good overall.  Continue fascia iliaca block    PT and OT     We have discussed with nursing about getting her eyedrops for glaucoma this morning.    Weight Bearing Status: Posterior hip precautions have 6 weeks.  Weight-bear as tolerated right lower extremity.    DVT prophylaxis: Low-dose Lovenox while in the hospital.  Can be discharged on baby aspirin twice daily for 4 weeks for DVT prophylaxis postoperatively.    Discharge planning: Anticipate discharge to a skilled nursing facility.  We are awaiting word on whether or  not the [facility] in [place] can accommodate her.  She is in a long-term private pay bed at her baseline.    Upon discharge, patient will need follow-up with me in the PA clinic in 2-3 weeks' time.  Continue DVTpx for 4 weeks.  Continue Aquacel dressing for 1 week after surgery and then remove and replace with a Covaderm or dry dressing every other day.  Keep incision covered at all times.    Call with questions or concerns.      Salas Arellano MD  07/17/25  08:07 EDT

## 2025-07-17 NOTE — THERAPY TREATMENT NOTE
Patient Name: Chula Chen  : 1931    MRN: [id number]                              Today's Date: 2025       Admit Date: 7/15/2025    Visit Dx:     ICD-10-CM ICD-9-CM   1. Closed fracture of neck of right femur, initial encounter  S72.001A 820.8   2. Laceration without foreign body of scalp, initial encounter  S01.01XA 873.0     Patient Active Problem List   Diagnosis    Gastroesophageal reflux disease without esophagitis    Depression    Essential hypertension    Other insomnia    Benign essential tremor    Other constipation    Vitamin D deficiency    Urinary frequency    Pneumonia    COVID-19 virus detected    Cytokine release syndrome, grade 1    Hip fracture     Past Medical History:   Diagnosis Date    Cystocele, midline     Hemorrhoids      Past Surgical History:   Procedure Laterality Date    BREAST BIOPSY      cyst removed    CATARACT EXTRACTION Bilateral     HIP HEMIARTHROPLASTY Right 7/15/2025    Procedure: HIP HEMIARTHROPLASTY;  Surgeon: Salas Arellano MD;  [facility];  Service: Orthopedics;  Laterality: Right;    THYROID SURGERY      nodule removal, Benign      General Information       Row Name 25 1021          Physical Therapy Time and Intention    Document Type therapy note (daily note)  -SC     Mode of Treatment physical therapy  -SC       Row Name 25 1021          General Information    Patient Profile Reviewed yes  -SC     Existing Precautions/Restrictions fall;right;hip, posterior;oxygen therapy device and L/min;other (see comments)  scoliosis, nerve cath  -SC       Row Name 25 1021          Cognition    Orientation Status (Cognition) oriented x 3  -SC       Row Name 25 1021          Safety Issues/Impairments Affecting Functional Mobility    Impairments Affecting Function (Mobility) balance;coordination;endurance/activity tolerance;motor control;pain;postural/trunk control;strength;range of motion (ROM)  -SC     Comment, Safety  Issues/Impairments (Mobility) alert, following commands  -SC               User Key  (r) = Recorded By, (t) = Taken By, (c) = Cosigned By      Initials Name Provider Type    SC Enzo La PT Physical Therapist                   Mobility       Row Name 07/17/25 1022          Bed Mobility    Bed Mobility supine-sit;scooting/bridging  -SC     Scooting/Bridging [place] (Bed Mobility) maximum assist (25% patient effort);2 person assist;verbal cues  -SC     Supine-Sit [place] (Bed Mobility) maximum assist (25% patient effort);verbal cues  -SC     Assistive Device (Bed Mobility) head of bed elevated;bed rails;repositioning sheet  -SC     Comment, (Bed Mobility) up to edge of bed with VC for sequencing. requried assist to move leg and right trunk. Time taken to scoot to EOB using UE and draw sheet.  -SC       Row Name 07/17/25 1022          Sit-Stand Transfer    Sit-Stand [place] (Transfers) verbal cues;maximum assist (25% patient effort)  -SC     Assistive Device (Sit-Stand Transfers) other (see comments)  arjo walker  -SC     Comment, (Sit-Stand Transfer) cues for hand placement on arjo. Patient able to pull up from handrail  -SC       Row Name 07/17/25 1022          Gait/Stairs (Locomotion)    [place] Level (Gait) 2 person assist;maximum assist (25% patient effort);nonverbal cues (demo/gesture);verbal cues  -SC     Assistive Device (Gait) other (see comments)  arjo  -SC     Distance in Feet (Gait) 6  -SC     Deviations/Abnormal Patterns (Gait) base of support, wide;stride length decreased;weight shifting decreased  -SC     Bilateral Gait Deviations forward flexed posture;heel strike decreased  -SC     Comment, (Gait/Stairs) Gt training focused on advancing legs with step through gait pattern. Initially required assist to advance both legs. She required less with time.  -SC       Row Name 07/17/25 1022          Mobility    Extremity Weight-bearing Status right lower extremity  -SC     Right Lower  Extremity (Weight-bearing Status) weight-bearing as tolerated (WBAT)  -SC               User Key  (r) = Recorded By, (t) = Taken By, (c) = Cosigned By      Initials Name Provider Type    SC Enzo La PT Physical Therapist                   Obj/Interventions       Row Name 07/17/25 1025          Motor Skills    Therapeutic Exercise hip;knee;ankle  -SC       Row Name 07/17/25 1025          Hip (Therapeutic Exercise)    Hip (Therapeutic Exercise) AROM (active range of motion)  -SC     Hip AROM (Therapeutic Exercise) flexion;extension;aBduction;aDduction;supine;10 repetitions  -SC       Row Name 07/17/25 1025          Knee (Therapeutic Exercise)    Knee (Therapeutic Exercise) AROM (active range of motion);isometric exercises  -SC     Knee AROM (Therapeutic Exercise) LAQ (long arc quad);heel slides;10 repetitions  -SC     Knee Isometrics (Therapeutic Exercise) bilateral;gluteal sets;quad sets  -SC       Row Name 07/17/25 1025          Ankle (Therapeutic Exercise)    Ankle (Therapeutic Exercise) AROM (active range of motion)  -SC     Ankle AROM (Therapeutic Exercise) supine;10 repetitions  -SC       Row Name 07/17/25 1025          Balance    Dynamic Standing Balance 2-person assist;maximum assist  -SC     Position/Device Used, Standing Balance other (see comments)  mechanical gait aid  -SC               User Key  (r) = Recorded By, (t) = Taken By, (c) = Cosigned By      Initials Name Provider Type    SC Enzo La PT Physical Therapist                   Goals/Plan    No documentation.                  Clinical Impression       Row Name 07/17/25 1026          Pain    Pain Location hip  -SC     Pain Side/Orientation right  -SC     Pain Management Interventions positioning techniques utilized  -SC     Response to Pain Interventions activity participation with tolerable pain  -SC     Additional Documentation Pain Scale: FACES Pre/Post-Treatment (Group)  -SC       Row Name 07/17/25 1026          Pain Scale: FACES  Pre/Post-Treatment    Pain: FACES Scale, Pretreatment 0-->no hurt  -SC     Posttreatment Pain Rating 4-->hurts little more  -[facility] Name 07/17/25 1026          Plan of Care Review    Plan of Care Reviewed With patient  -SC     Progress improving  -SC     Outcome Evaluation Patient is fearful of falling . She was able to advance to taking steps using mechanical gait aid  -[facility] Name 07/17/25 1026          Therapy Assessment/Plan (PT)    Rehab Potential (PT) fair  -SC     Criteria for Skilled Interventions Met (PT) yes;meets criteria;skilled treatment is necessary  -SC     Therapy Frequency (PT) daily  -[facility] Name 07/17/25 1026          Positioning and Restraints    Pre-Treatment Position in bed  -SC     Post Treatment Position chair  -SC     In Chair notified nsg;reclined;sitting;call light within reach;encouraged to call for assist;exit alarm on  -SC               User Key  (r) = Recorded By, (t) = Taken By, (c) = Cosigned By      Initials Name Provider Type    SC Enzo La, PT Physical Therapist                   Outcome Measures       [facility] Name 07/17/25 1027 07/17/25 0800       How much help from another person do you currently need...    Turning from your back to your side while in flat bed without using bedrails? 2  -SC 3  -MD    Moving from lying on back to sitting on the side of a flat bed without bedrails? 2  -SC 3  -MD    Moving to and from a bed to a chair (including a wheelchair)? 2  -SC 2  -MD    Standing up from a chair using your arms (e.g., wheelchair, bedside chair)? 2  -SC 2  -MD    Climbing 3-5 steps with a railing? 1  -SC 1  -MD    To walk in hospital room? 2  -SC 2  -MD    AM-PAC 6 Clicks Score (PT) 11  -SC 13  -MD    Highest Level of Mobility Goal Move to Chair/Commode-4  -SC Move to Chair/Commode-4  -MD      [facility] Name 07/17/25 1027          Functional Assessment    Outcome Measure Options AM-PAC 6 Clicks Basic Mobility (PT)  -SC               User Key  (r) = Recorded By,  (t) = Taken By, (c) = Cosigned By      Initials Name Provider Type    SC Enzo La, PT Physical Therapist    Lissett Thomas, RN Registered Nurse                                 Physical Therapy Education       Title: PT OT SLP Therapies (In Progress)       Topic: Physical Therapy (Done)       Point: Mobility training (Done)       Learning Progress Summary            Patient Eager, E, VU by SC at 7/17/2025 1027    Comment: reviewed benefits of walking    Acceptance, E,D, VU,NR by  at 7/16/2025 1609    Comment: Post Hip Precautions next 6wks  WBAT RLE    Acceptance, E, VU,NR by BC at 7/16/2025 0800                      Point: Home exercise program (Done)       Learning Progress Summary            Patient Eager, E, VU by SC at 7/17/2025 1027    Comment: reviewed benefits of walking    Acceptance, E,D, VU,NR by  at 7/16/2025 1609    Comment: Post Hip Precautions next 6wks  WBAT RLE    Acceptance, E, VU,NR by BC at 7/16/2025 0800                      Point: Body mechanics (Done)       Learning Progress Summary            Patient Eager, E, VU by SC at 7/17/2025 1027    Comment: reviewed benefits of walking    Acceptance, E,D, VU,NR by  at 7/16/2025 1609    Comment: Post Hip Precautions next 6wks  WBAT RLE    Acceptance, E, VU,NR by BC at 7/16/2025 0800                      Point: Precautions (Done)       Learning Progress Summary            Patient Eager, E, VU by SC at 7/17/2025 1027    Comment: reviewed benefits of walking    Acceptance, E,D, VU,NR by  at 7/16/2025 1609    Comment: Post Hip Precautions next 6wks  WBAT RLE    Acceptance, E, VU,NR by BC at 7/16/2025 0800                                      User Key       Initials Effective Dates Name Provider Type Discipline    SC 02/03/23 -  Enzo La, PT Physical Therapist PT    BC 04/27/21 -  Amanda Garay, RN Registered Nurse Nurse     05/05/25 -  Sidra Corona, PT Student PT Student PT                  PT Recommendation and Plan      Progress: improving  Outcome Evaluation: Patient is fearful of falling . She was able to advance to taking steps using mechanical gait aid     Time Calculation:         PT Charges       Row Name 07/17/25 0942             Time Calculation    Start Time 0942  -SC      PT Received On 07/17/25  -SC      PT Goal Re-Cert Due Date 07/26/25  -SC         Timed Charges    63188 - PT Therapeutic Exercise Minutes 10  -SC      92421 - Gait Training Minutes  8  -SC      93917 - PT Therapeutic Activity Minutes 15  -SC         Total Minutes    Timed Charges Total Minutes 33  -SC       Total Minutes 33  -SC                User Key  (r) = Recorded By, (t) = Taken By, (c) = Cosigned By      Initials Name Provider Type    SC Abhinav, Enzo ALFORD, PT Physical Therapist                  Therapy Charges for Today       Code Description Service Date Service Provider Modifiers Qty    82416119497 HC PT THER PROC EA 15 MIN 7/17/2025 Enzo La, PT GP 1    63346734903 HC PT THERAPEUTIC ACT EA 15 MIN 7/17/2025 Enzo La, PT GP 1    14783145817 HC PT THER SUPP EA 15 MIN 7/17/2025 Enzo La, PT GP 2            PT G-Codes  Outcome Measure Options: AM-PAC 6 Clicks Basic Mobility (PT)  AM-PAC 6 Clicks Score (PT): 11  AM-PAC 6 Clicks Score (OT): 12  PT Discharge Summary  Anticipated Discharge Disposition (PT): skilled nursing facility    Enzo La, PT  7/17/2025

## 2025-07-17 NOTE — PLAN OF CARE
Problem: Adult Inpatient Plan of Care  Goal: Absence of Hospital-Acquired Illness or Injury  Outcome: Progressing  Intervention: Identify and Manage Fall Risk  Recent Flowsheet Documentation  Taken 7/17/2025 0000 by Theresa Whitney RN  Safety Promotion/Fall Prevention: safety round/check completed  Taken 7/16/2025 1945 by Theresa Whitney RN  Safety Promotion/Fall Prevention:   assistive device/personal items within reach   clutter free environment maintained   fall prevention program maintained   gait belt   nonskid shoes/slippers when out of bed   room organization consistent   safety round/check completed  Intervention: Prevent Skin Injury  Recent Flowsheet Documentation  Taken 7/17/2025 0000 by Theresa Whitney RN  Body Position: position maintained  Skin Protection: incontinence pads utilized  Taken 7/16/2025 1945 by Theresa Whitney RN  Body Position: position maintained  Skin Protection: incontinence pads utilized  Intervention: Prevent and Manage VTE (Venous Thromboembolism) Risk  Recent Flowsheet Documentation  Taken 7/17/2025 0000 by Theresa Whitney RN  VTE Prevention/Management:   left   SCDs (sequential compression devices) on  Taken 7/16/2025 1945 by Theresa Whitney RN  VTE Prevention/Management:   left   SCDs (sequential compression devices) on  Intervention: Prevent Infection  Recent Flowsheet Documentation  Taken 7/16/2025 1945 by Theresa Whitney RN  Infection Prevention:   environmental surveillance performed   rest/sleep promoted   single patient room provided   Goal Outcome Evaluation:

## 2025-07-17 NOTE — PLAN OF CARE
Goal Outcome Evaluation:  Plan of Care Reviewed With: patient        Progress: improving  Outcome Evaluation: Patient is fearful of falling . She was able to advance to taking steps using mechanical gait aid    Anticipated Discharge Disposition (PT): skilled nursing facility

## 2025-07-17 NOTE — PROGRESS NOTES
[facility]  PROGRESS NOTE    Patient Name: Chula Chen  : 1931  MRN: [id number]    Date of Admission: 7/15/2025  Primary Care Physician: Provider, No Known    Subjective   Subjective     CC:  Fall, right hip fracture    HPI:  Resting in a chair in no acute distress and overall feels okay.  Tells me that she is breathing comfortably but currently she is on 3 L of nasal cannula.  No fever or chills.  No chest pain palpitation shortness of breath.  No nausea vomiting or diarrhea      Objective   Objective     Vital Signs:   Temp:  [97.7 °F (36.5 °C)-98.5 °F (36.9 °C)] 98.3 °F (36.8 °C)  Heart Rate:  [82-92] 82  Resp:  [16] 16  BP: ()/(51-65) 118/65  Flow (L/min) (Oxygen Therapy):  [4] 4     Physical Exam:  Constitutional: No acute distress, awake, alert  HENT: NCAT, mucous membranes moist  Respiratory: Some crackles audible on the right side, respiratory effort normal   Cardiovascular: RRR, no murmurs, rubs, or gallops  Gastrointestinal: Positive bowel sounds, soft, nontender, nondistended  Musculoskeletal: No bilateral ankle edema  Psychiatric: Flat affect, cooperative  Neurologic: Awake, alert, follows commands, speech is clear    Results Reviewed:  LAB RESULTS:      Lab 25  0615 25  0836 07/15/25  0248   WBC 14.15* 11.99* 11.97*   HEMOGLOBIN 9.5* 10.2* 13.3   HEMATOCRIT 28.9* 31.1* 40.2   PLATELETS 308 319 432   NEUTROS ABS 9.09* 9.31* 8.06*   IMMATURE GRANS (ABS) 0.07* 0.04 0.05   LYMPHS ABS 3.07 1.63 2.69   MONOS ABS 1.69* 0.86 0.93*   EOS ABS 0.20 0.12 0.21   MCV 85.8 83.8 83.4         Lab 25  0836 07/15/25  1105 07/15/25  0248   SODIUM 138  --  134*   POTASSIUM 3.7  --  3.8   CHLORIDE 105  --  100   CO2 22.1  --  22.0   ANION GAP 10.9  --  12.0   BUN 9.9  --  18.5   CREATININE 0.53*  --  0.49*   EGFR 85.8  --  87.5   GLUCOSE 170*  --  125*   CALCIUM 8.0*  --  9.2   MAGNESIUM  --  2.0  --    PHOSPHORUS  --  2.5  --          Lab  07/15/25  0248   TOTAL PROTEIN 6.9   ALBUMIN 3.3*   GLOBULIN 3.6   ALT (SGPT) 87*   AST (SGOT) 36*   BILIRUBIN 0.2   ALK PHOS 152*                     Brief Urine Lab Results  (Last result in the past 365 days)        Color   Clarity   Blood   Leuk Est   Nitrite   Protein   CREAT   Urine HCG        07/15/25 0958 Yellow   Turbid   Moderate (2+)   Large (3+)   Negative   30 mg/dL (1+)                   Microbiology Results Abnormal       Procedure Component Value - Date/Time    Urine Culture - Urine, Straight Cath [[id number]]  (Abnormal)  (Susceptibility) Collected: 07/15/25 0958    Lab Status: Final result Specimen: Urine from Straight Cath Updated: 07/17/25 1001     Urine Culture >100,000 CFU/mL Escherichia coli    Narrative:      Colonization of the urinary tract without infection is common. Treatment is discouraged unless the patient is symptomatic, pregnant, or undergoing an invasive urologic procedure.    Susceptibility        Escherichia coli      JAYJAY      Amoxicillin + Clavulanate Susceptible      Ampicillin Susceptible      Ampicillin + Sulbactam Susceptible      Cefazolin (Urine) Susceptible      Cefepime Susceptible      Ceftazidime Susceptible      Ceftriaxone Susceptible      Cefuroxime axetil Susceptible      Ciprofloxacin Resistant      Gentamicin Susceptible      Levofloxacin Resistant      Nitrofurantoin Susceptible      Piperacillin + Tazobactam Susceptible      Trimethoprim + Sulfamethoxazole Susceptible                                   XR Chest 1 View  Result Date: 7/17/2025  XR CHEST 1 VW Date of Exam: 7/17/2025 2:16 PM EDT Indication: SOB Comparison: 7/6/2023 Findings: Cardiac size is stable. Vascular markings are normal. There is marked elevation of the right hemidiaphragm with a large right-sided diaphragmatic hernia. There is increased consolidation in the right lung base compared with the prior study. The left lung  appears clear.     Impression: Impression: Marked elevation of the right  hemidiaphragm with a large right-sided diaphragmatic hernia. There is increased consolidation in the right lung base compared with the prior study. Electronically Signed: Gerry Milian MD  7/17/2025 2:52 PM EDT  Workstation ID: [id number]    XR Hip With or Without Pelvis 2 - 3 View Right  Result Date: 7/15/2025  XR HIP W OR WO PELVIS 2-3 VIEW RIGHT Date of Exam: 7/15/2025 6:50 PM EDT Indication: Post-Op Hip Arthroplasty Comparison: Hip and pelvis radiographs 7/15/2025 Findings: Interval right hip arthroplasty without radiographic evidence of immediate complication. Surrounding soft tissue edema and subcutaneous emphysema, favored postoperative. Degenerative changes of the sacroiliac joints, left hip, and pubic symphysis appear unchanged.     Impression: Impression: Interval right hip arthroplasty without radiographic evidence of immediate complication. Electronically Signed: Andrez Martinez MD  7/15/2025 7:12 PM EDT  Workstation ID: [id number]          Current medications:  Scheduled Meds:cefTRIAXone, 1,000 mg, Intravenous, Q24H  citalopram, 20 mg, Oral, Daily  docusate sodium, 100 mg, Oral, BID  dorzolamide, 1 drop, Both Eyes, Daily  doxycycline, 100 mg, Intravenous, Q12H  enoxaparin sodium, 30 mg, Subcutaneous, Daily  fluticasone, 2 spray, Nasal, Daily  midodrine, 5 mg, Oral, TID AC  primidone, 50 mg, Oral, TID  sodium chloride, 10 mL, Intravenous, Q12H  sodium chloride, 3 mL, Intravenous, Q12H  timolol, 1 drop, Both Eyes, Daily      Continuous Infusions:ropivacaine,       PRN Meds:.  acetaminophen **OR** acetaminophen **OR** acetaminophen    calcium carbonate    Calcium Replacement - Follow Nurse / BPA Driven Protocol    clonazePAM    docusate sodium    Magnesium Standard Dose Replacement - Follow Nurse / BPA Driven Protocol    Morphine **AND** naloxone    ondansetron ODT **OR** ondansetron    Phosphorus Replacement - Follow Nurse / BPA Driven Protocol    Potassium Replacement - Follow Nurse / BPA Driven Protocol     sodium chloride    sodium chloride    sodium chloride    sodium chloride    traMADol    Assessment & Plan   Assessment & Plan     Active Hospital Problems    Diagnosis  POA    **Hip fracture [S72.009A]  Yes      Resolved Hospital Problems   No resolved problems to display.        Brief Hospital Course to date:  Chula Chen is a 89 y.o. female  with past medical history significant for hypertension, essential tremors, history of COVID, and assisted living facility resident.  Evidently patient was in her usual state of health until earlier this morning when she woke up and tried to go to the bathroom using her walker.  In the bathroom evidently she lost the control of the walker and had a fall.     Acute hypoxemia with x-ray showing large hiatal hernia with right hemidiaphragm elevated and today mediastinal.  -Chest x-ray also shows some consolidation on the right lower lobe  -Patient is on Rocephin currently.  Will add doxycycline and monitor.     Status post fall and right hip fracture  - X-ray of the right hip shows subcapital right femoral neck fracture  - S/p surgery by Dr. Arellano with no immediate complications.    Low blood pressure  -We gave 500 cc bolus of normal saline.  Will also start the patient on midodrine to prevent very low pressure.    - Currently blood pressure is much better controlled.     Difficulty with urination  - UA shows large leukocyte Estrace  - Urine culture growing E. Coli, we will start the patient on Rocephin     Depression  - Patient is on Celexa and also clonazepam at home.  Will continue the home medication and monitor     Essential tremors  - Patient is on primidone at home which we will continue.         Expected Discharge Location and Transportation: rehab  Expected Discharge to be determined  Expected Discharge Date: 7/18/2025; Expected Discharge Time:      VTE Prophylaxis:  Pharmacologic & mechanical VTE prophylaxis orders are present.         AM-PAC 6 Clicks Score  (PT): 11 (07/17/25 1027)    CODE STATUS:   Code Status and Medical Interventions: CPR (Attempt to Resuscitate); Full Support   Ordered at: 07/15/25 1948     Code Status (Patient has no pulse and is not breathing):    CPR (Attempt to Resuscitate)     Medical Interventions (Patient has pulse or is breathing):    Full Support     Level Of Support Discussed With:    Patient       Willie Acuna MD  07/17/25

## 2025-07-18 PROCEDURE — 25010000002 CEFTRIAXONE PER 250 MG: Performed by: INTERNAL MEDICINE

## 2025-07-18 PROCEDURE — 99232 SBSQ HOSP IP/OBS MODERATE 35: CPT | Performed by: INTERNAL MEDICINE

## 2025-07-18 PROCEDURE — 25010000002 DOXYCYCLINE 100 MG RECONSTITUTED SOLUTION 1 EACH VIAL: Performed by: INTERNAL MEDICINE

## 2025-07-18 PROCEDURE — 25010000002 ENOXAPARIN PER 10 MG: Performed by: ORTHOPAEDIC SURGERY

## 2025-07-18 PROCEDURE — 97535 SELF CARE MNGMENT TRAINING: CPT | Performed by: OCCUPATIONAL THERAPIST

## 2025-07-18 PROCEDURE — 99024 POSTOP FOLLOW-UP VISIT: CPT | Performed by: ORTHOPAEDIC SURGERY

## 2025-07-18 PROCEDURE — 97110 THERAPEUTIC EXERCISES: CPT

## 2025-07-18 PROCEDURE — 97530 THERAPEUTIC ACTIVITIES: CPT

## 2025-07-18 RX ORDER — DOXYCYCLINE 100 MG/1
100 CAPSULE ORAL EVERY 12 HOURS SCHEDULED
Status: DISCONTINUED | OUTPATIENT
Start: 2025-07-18 | End: 2025-07-19 | Stop reason: HOSPADM

## 2025-07-18 RX ADMIN — DOXYCYCLINE 100 MG: 100 INJECTION, POWDER, LYOPHILIZED, FOR SOLUTION INTRAVENOUS at 06:51

## 2025-07-18 RX ADMIN — DOXYCYCLINE 100 MG: 100 CAPSULE ORAL at 17:04

## 2025-07-18 RX ADMIN — PRIMIDONE 50 MG: 50 TABLET ORAL at 17:04

## 2025-07-18 RX ADMIN — TRAMADOL HYDROCHLORIDE 50 MG: 50 TABLET, COATED ORAL at 22:33

## 2025-07-18 RX ADMIN — TIMOLOL MALEATE 1 DROP: 5 SOLUTION OPHTHALMIC at 09:25

## 2025-07-18 RX ADMIN — FLUTICASONE PROPIONATE 2 SPRAY: 50 SPRAY, METERED NASAL at 09:24

## 2025-07-18 RX ADMIN — MIDODRINE HYDROCHLORIDE 5 MG: 5 TABLET ORAL at 17:04

## 2025-07-18 RX ADMIN — MIDODRINE HYDROCHLORIDE 5 MG: 5 TABLET ORAL at 12:00

## 2025-07-18 RX ADMIN — PRIMIDONE 50 MG: 50 TABLET ORAL at 09:23

## 2025-07-18 RX ADMIN — ENOXAPARIN SODIUM 30 MG: 100 INJECTION SUBCUTANEOUS at 09:25

## 2025-07-18 RX ADMIN — Medication 3 ML: at 22:35

## 2025-07-18 RX ADMIN — CITALOPRAM HYDROBROMIDE 20 MG: 20 TABLET ORAL at 09:23

## 2025-07-18 RX ADMIN — DOCUSATE SODIUM 100 MG: 100 CAPSULE, LIQUID FILLED ORAL at 09:23

## 2025-07-18 RX ADMIN — DOCUSATE SODIUM 100 MG: 100 CAPSULE, LIQUID FILLED ORAL at 22:34

## 2025-07-18 RX ADMIN — MIDODRINE HYDROCHLORIDE 5 MG: 5 TABLET ORAL at 09:23

## 2025-07-18 RX ADMIN — CLONAZEPAM 0.5 MG: 0.5 TABLET ORAL at 22:33

## 2025-07-18 RX ADMIN — Medication 10 ML: at 09:25

## 2025-07-18 RX ADMIN — DORZOLAMIDE HCL 1 DROP: 20 SOLUTION/ DROPS OPHTHALMIC at 09:25

## 2025-07-18 RX ADMIN — Medication 10 ML: at 22:35

## 2025-07-18 RX ADMIN — SODIUM CHLORIDE 1000 MG: 900 INJECTION INTRAVENOUS at 13:53

## 2025-07-18 RX ADMIN — PRIMIDONE 50 MG: 50 TABLET ORAL at 22:33

## 2025-07-18 RX ADMIN — Medication 3 ML: at 09:25

## 2025-07-18 NOTE — PROGRESS NOTES
[facility]    Acute pain service Inpatient Progress Note    Patient Name: Chula Chen  :  1931  MRN:  [id number]        Acute Pain  Service Inpatient Progress Note:    Analgesia:Good  Pain Score:3/10  LOC: alert and awake  Side Effects:None  Catheter Site:clean, dry and dressing intact  Cath type: peripheral nerve cath(InfuSystem)  Infusion rate: Fem/ Add: Basal: 1ml/hr, PIB: 8ml q 8h, PCA: 8ml q 30 min  Catheter Plan:Catheter to remain Insitu and Continue catheter infusion rate unchanged

## 2025-07-18 NOTE — THERAPY TREATMENT NOTE
Patient Name: Chula Chen  : 1931    MRN: [id number]                              Today's Date: 2025       Admit Date: 7/15/2025    Visit Dx:     ICD-10-CM ICD-9-CM   1. Closed fracture of neck of right femur, initial encounter  S72.001A 820.8   2. Laceration without foreign body of scalp, initial encounter  S01.01XA 873.0     Patient Active Problem List   Diagnosis    Gastroesophageal reflux disease without esophagitis    Depression    Essential hypertension    Other insomnia    Benign essential tremor    Other constipation    Vitamin D deficiency    Urinary frequency    Pneumonia    COVID-19 virus detected    Cytokine release syndrome, grade 1    Hip fracture     Past Medical History:   Diagnosis Date    Cystocele, midline     Hemorrhoids      Past Surgical History:   Procedure Laterality Date    BREAST BIOPSY      cyst removed    CATARACT EXTRACTION Bilateral     HIP HEMIARTHROPLASTY Right 7/15/2025    Procedure: HIP HEMIARTHROPLASTY;  Surgeon: Salas Arellano MD;  Location: [facility];  Service: Orthopedics;  Laterality: Right;    THYROID SURGERY      nodule removal, Benign      General Information       Row Name 25 1100          OT Time and Intention    Subjective Information no complaints  -TB     Document Type therapy note (daily note)  -TB     Mode of Treatment occupational therapy  -TB     Patient Effort good  -TB     Symptoms Noted During/After Treatment shortness of breath  -TB       Row Name 25 1100          General Information    Patient Profile Reviewed yes  -TB     Existing Precautions/Restrictions fall;right;hip, posterior;oxygen therapy device and L/min;other (see comments)  nerve catheter, desats with minimal activity. h/o scoliosis  -TB     Barriers to Rehab medically complex;previous functional deficit  -TB       Row Name 25 1100          Occupational Profile    Reason for Services/Referral (Occupational Profile) Occupational decline  -TB        Row Name 07/18/25 1100          Cognition    Orientation Status (Cognition) oriented x 3  -TB       Row Name 07/18/25 1100          Safety Issues/Impairments Affecting Functional Mobility    Safety Issues Affecting Function (Mobility) insight into deficits/self-awareness;safety precaution awareness;safety precautions follow-through/compliance;sequencing abilities;problem-solving  -TB     Impairments Affecting Function (Mobility) balance;endurance/activity tolerance;pain;strength;range of motion (ROM);postural/trunk control;sensation/sensory awareness  -TB               User Key  (r) = Recorded By, (t) = Taken By, (c) = Cosigned By      Initials Name Provider Type    TB Sarah Landaverde, OT Occupational Therapist                     Mobility/ADL's       Row Name 07/18/25 1102          Bed Mobility    Bed Mobility supine-sit;scooting/bridging  -TB     Scooting/Bridging [place] (Bed Mobility) maximum assist (25% patient effort);2 person assist;verbal cues  -TB     Supine-Sit [place] (Bed Mobility) maximum assist (25% patient effort);2 person assist;verbal cues  -TB     Bed Mobility, Safety Issues decreased use of arms for pushing/pulling;decreased use of legs for bridging/pushing;impaired trunk control for bed mobility  -TB     Assistive Device (Bed Mobility) head of bed elevated;repositioning sheet  -TB     Comment, (Bed Mobility) Pt transitions to EOB with education, cues, and assist. Presents with R side lateral lean requiring assist to achieve midline. Denies dizziness.  -TB       Row Name 07/18/25 1102          Transfers    Transfers sit-stand transfer;stand-sit transfer;toilet transfer;bed-chair transfer  -TB     Comment, (Transfers) Education reinforced for PHP. Pt able to complete 3 Std Pvt transfers with Max Ax2. Pt is limited by fear of falling.  -TB       Row Name 07/18/25 1102          Bed-Chair Transfer    Bed-Chair [place] (Transfers) maximum assist (25% patient effort);2 person  assist;verbal cues  -TB     Assistive Device (Bed-Chair Transfers) --  BUE support  -TB       Row Name 07/18/25 1102          Sit-Stand Transfer    Sit-Stand [place] (Transfers) maximum assist (25% patient effort);2 person assist;verbal cues  -TB     Assistive Device (Sit-Stand Transfers) --  BUE support  -TB       Row Name 07/18/25 1102          Stand-Sit Transfer    Stand-Sit [place] (Transfers) maximum assist (25% patient effort);2 person assist;verbal cues  -TB     Assistive Device (Stand-Sit Transfers) --  BUE support  -TB       Row Name 07/18/25 1102          Toilet Transfer    Type (Toilet Transfer) stand pivot/stand step  -TB     [place] Level (Toilet Transfer) maximum assist (25% patient effort);2 person assist;verbal cues  -TB     Assistive Device (Toilet Transfer) commode, bedside with drop arms  BUE support  -TB       Row Name 07/18/25 1102          Functional Mobility    Functional Mobility- Comment Pt able to stand and take side steps from EOB to recliner, recliner to BSC, and again from BSC returning to recliner chair. Good effort.  -TB     Patient was able to Ambulate yes  -TB       Row Name 07/18/25 1102          Activities of Daily Living    BADL Assessment/Intervention toileting;grooming;lower body dressing  -TB       Row Name 07/18/25 1102          Mobility    Extremity Weight-bearing Status right lower extremity  -TB     Right Lower Extremity (Weight-bearing Status) weight-bearing as tolerated (WBAT)  -TB       Row Name 07/18/25 1102          Lower Body Dressing Assessment/Training    [place] Level (Lower Body Dressing) don;socks;dependent (less than 25% patient effort)  -TB     Position (Lower Body Dressing) sitting up in bed  -TB       Row Name 07/18/25 1102          Toileting Assessment/Training    [place] Level (Toileting) adjust/manage clothing;perform perineal hygiene;dependent (less than 25% patient effort)  -TB     Position (Toileting) supported sitting;supported  standing  -TB       Row Name 07/18/25 1102          Grooming Assessment/Training    [place] Level (Grooming) maximum assist (25% patient effort);hair care, combing/brushing  -TB     Position (Grooming) supported sitting  -TB               User Key  (r) = Recorded By, (t) = Taken By, (c) = Cosigned By      Initials Name Provider Type    TB Sarah Landaverde OT Occupational Therapist                   Obj/Interventions       Row Name 07/18/25 1107          Balance    Balance Assessment sitting static balance;sitting dynamic balance;sit to stand dynamic balance;standing static balance;standing dynamic balance  -TB     Static Sitting Balance standby assist  -TB     Dynamic Sitting Balance minimal assist  -TB     Position, Sitting Balance supported;sitting edge of bed;sitting in chair  -TB     Sit to Stand Dynamic Balance maximum assist;2-person assist;verbal cues  -TB     Static Standing Balance maximum assist;verbal cues  -TB     Dynamic Standing Balance maximum assist;2-person assist;verbal cues  -TB     Position/Device Used, Standing Balance supported  BUE support  -TB     Balance Interventions sitting;standing;sit to stand;supported;static;dynamic;dynamic reaching;occupation based/functional task;weight shifting activity  -TB     Comment, Balance Pt presents with R lateral lean throughout session. Requires assist to achieve midline. Able to hold midline and participate in dynamic reaching activities to support self-care with R side LOB x2.  -TB               User Key  (r) = Recorded By, (t) = Taken By, (c) = Cosigned By      Initials Name Provider Type    TB Sarah Landaverde OT Occupational Therapist                   Goals/Plan    No documentation.                  Clinical Impression       Row Name 07/18/25 1109          Pain Assessment    Pain Location hip  -TB     Pain Side/Orientation right  -TB     Pain Management Interventions activity modification encouraged;exercise or physical activity  utilized;positioning techniques utilized  -TB     Response to Pain Interventions activity participation with tolerable pain;activity level improved  -TB     Additional Documentation Pain Scale: FACES Pre/Post-Treatment (Group)  -TB       Row Name 07/18/25 1109          Pain Scale: FACES Pre/Post-Treatment    Pain: FACES Scale, Pretreatment 2-->hurts little bit  -TB     Posttreatment Pain Rating 4-->hurts little more  -TB       Row Name 07/18/25 1109          Plan of Care Review    Plan of Care Reviewed With patient;daughter  -TB     Outcome Evaluation Pt participates in therapy with good effort. Education reinforced for PHP with mobility and self-care. Transitions to EOB with Max Ax2. Pt able to stand and transfer x3 this session with BUE support and Max Ax2. Limited by activity tolerance deficits and fear of falling. Pt is dependent with LB ADLs and toileting this session. OT will continue to follow IP. Recommend return to SNF when pt is medically ready.  -TB       Row Name 07/18/25 1109          Therapy Plan Review/Discharge Plan (OT)    Anticipated Discharge Disposition (OT) skilled nursing facility  -TB       Row Name 07/18/25 1104          Vital Signs    Pre Systolic BP Rehab --  RN cleared OT  -TB     Pre SpO2 (%) 94  -TB     O2 Delivery Pre Treatment nasal cannula  -TB     Intra SpO2 (%) 84  -TB     O2 Delivery Intra Treatment nasal cannula  Pt desats with dynamic activity on 1L, increased to 3L to rebound and then returned to 1L at rest.  -TB     Post SpO2 (%) 91  -TB     O2 Delivery Post Treatment nasal cannula  -TB     Pre Patient Position Supine  -TB     Intra Patient Position Standing  -TB     Post Patient Position Sitting  -TB       Row Name 07/18/25 1104          Positioning and Restraints    Pre-Treatment Position in bed  -TB     Post Treatment Position chair  -TB     In Chair notified nsg;reclined;call light within reach;encouraged to call for assist;exit alarm on;with family/caregiver;on mechanical  lift sling;waffle cushion;legs elevated;pillow between legs  -TB               User Key  (r) = Recorded By, (t) = Taken By, (c) = Cosigned By      Initials Name Provider Type    TB Sarah Landaverde OT Occupational Therapist                   Outcome Measures       Row Name 07/18/25 1113          How much help from another is currently needed...    Putting on and taking off regular lower body clothing? 1  -TB     Bathing (including washing, rinsing, and drying) 2  -TB     Toileting (which includes using toilet bed pan or urinal) 1  -TB     Putting on and taking off regular upper body clothing 2  -TB     Taking care of personal grooming (such as brushing teeth) 2  -TB     Eating meals 3  -TB     AM-PAC 6 Clicks Score (OT) 11  -TB       Row Name 07/18/25 0800 07/18/25 0000       How much help from another person do you currently need...    Turning from your back to your side while in flat bed without using bedrails? 2  -MD 2  -TF    Moving from lying on back to sitting on the side of a flat bed without bedrails? 2  -MD 2  -TF    Moving to and from a bed to a chair (including a wheelchair)? 2  -MD 2  -TF    Standing up from a chair using your arms (e.g., wheelchair, bedside chair)? 2  -MD 2  -TF    Climbing 3-5 steps with a railing? 1  -MD 1  -TF    To walk in hospital room? 2  -MD 2  -TF    AM-PAC 6 Clicks Score (PT) 11  -MD 11  -TF    Highest Level of Mobility Goal Move to Chair/Commode-4  -MD Move to Chair/Commode-4  -TF      Row Name 07/18/25 1113          Functional Assessment    Outcome Measure Options AM-PAC 6 Clicks Daily Activity (OT)  -TB               User Key  (r) = Recorded By, (t) = Taken By, (c) = Cosigned By      Initials Name Provider Type    Sarah Flores OT Occupational Therapist    Lissett Thomas, RN Registered Nurse    Kathy Moffett RN Registered Nurse                    Occupational Therapy Education       Title: PT OT SLP Therapies (In Progress)       Topic:  Occupational Therapy (In Progress)       Point: ADL training (In Progress)       Learning Progress Summary            Patient Acceptance, E,D, NR by TB at 7/18/2025 1113    Acceptance, E,D, NR by JY at 7/16/2025 1320   Family Acceptance, E,D, NR by TB at 7/18/2025 1113                      Point: Precautions (In Progress)       Learning Progress Summary            Patient Acceptance, E,D, NR by TB at 7/18/2025 1113    Acceptance, E,D, NR by JY at 7/16/2025 1320   Family Acceptance, E,D, NR by TB at 7/18/2025 1113                      Point: Body mechanics (In Progress)       Learning Progress Summary            Patient Acceptance, E,D, NR by JY at 7/16/2025 1320                                      User Key       Initials Effective Dates Name Provider Type Discipline     07/11/23 -  Sarah Landaverde, OT Occupational Therapist OT    JY 06/16/21 -  Loree Phillips OT Occupational Therapist OT                  OT Recommendation and Plan     Plan of Care Review  Plan of Care Reviewed With: patient, daughter  Outcome Evaluation: Pt participates in therapy with good effort. Education reinforced for PHP with mobility and self-care. Transitions to EOB with Max Ax2. Pt able to stand and transfer x3 this session with BUE support and Max Ax2. Limited by activity tolerance deficits and fear of falling. Pt is dependent with LB ADLs and toileting this session. OT will continue to follow IP. Recommend return to SNF when pt is medically ready.     Time Calculation:         Time Calculation- OT       Row Name 07/18/25 0922             Time Calculation- OT    OT Start Time 0922  -TB      OT Received On 07/18/25  -TB      OT Goal Re-Cert Due Date 07/26/25  -TB         Timed Charges    88184 - OT Self Care/Mgmt Minutes 23  -TB         Total Minutes    Timed Charges Total Minutes 23  -TB       Total Minutes 23  -TB                User Key  (r) = Recorded By, (t) = Taken By, (c) = Cosigned By      Initials Name Provider Type     TB Sarah Landaverde, JAKE Occupational Therapist                  Therapy Charges for Today       Code Description Service Date Service Provider Modifiers Qty    [id number] HC OT SELF CARE/MGMT/TRAIN EA 15 MIN 7/18/2025 Sarah Landaverde OT GO 2                 Sarah Landaverde OT  7/18/2025

## 2025-07-18 NOTE — PROGRESS NOTES
[facility] Medicine Services  PROGRESS NOTE    Patient Name: Chula Chen  : 1931  MRN: [id number]    Date of Admission: 7/15/2025  Primary Care Physician: Provider, No Known    Subjective   Subjective     CC:  Fall, right hip fracture    HPI:  Resting in a chair in no acute distress and overall feels okay.  Patient's daughter is also present at bedside.  No fever or chills.  No chest pain palpitation shortness of breath.  No nausea vomiting or diarrhea      Objective   Objective     Vital Signs:   Temp:  [97.6 °F (36.4 °C)-98.6 °F (37 °C)] 98.4 °F (36.9 °C)  Heart Rate:  [] 81  Resp:  [16] 16  BP: (134-151)/(65-77) 134/65  Flow (L/min) (Oxygen Therapy):  [1-3] 1     Physical Exam:  Constitutional: No acute distress, awake, alert  HENT: NCAT, mucous membranes moist  Respiratory: Some crackles audible on the right side, respiratory effort normal   Cardiovascular: RRR, no murmurs, rubs, or gallops  Gastrointestinal: Positive bowel sounds, soft, nontender, nondistended  Musculoskeletal: No bilateral ankle edema  Psychiatric: Flat affect, cooperative  Neurologic: Awake, alert, follows commands, speech is clear    Results Reviewed:  LAB RESULTS:      Lab 25  0615 25  0836 07/15/25  0248   WBC 14.15* 11.99* 11.97*   HEMOGLOBIN 9.5* 10.2* 13.3   HEMATOCRIT 28.9* 31.1* 40.2   PLATELETS 308 319 432   NEUTROS ABS 9.09* 9.31* 8.06*   IMMATURE GRANS (ABS) 0.07* 0.04 0.05   LYMPHS ABS 3.07 1.63 2.69   MONOS ABS 1.69* 0.86 0.93*   EOS ABS 0.20 0.12 0.21   MCV 85.8 83.8 83.4         Lab 25  0836 07/15/25  1105 07/15/25  0248   SODIUM 138  --  134*   POTASSIUM 3.7  --  3.8   CHLORIDE 105  --  100   CO2 22.1  --  22.0   ANION GAP 10.9  --  12.0   BUN 9.9  --  18.5   CREATININE 0.53*  --  0.49*   EGFR 85.8  --  87.5   GLUCOSE 170*  --  125*   CALCIUM 8.0*  --  9.2   MAGNESIUM  --  2.0  --    PHOSPHORUS  --  2.5  --          Lab 07/15/25  0248   TOTAL PROTEIN 6.9    ALBUMIN 3.3*   GLOBULIN 3.6   ALT (SGPT) 87*   AST (SGOT) 36*   BILIRUBIN 0.2   ALK PHOS 152*                     Brief Urine Lab Results  (Last result in the past 365 days)        Color   Clarity   Blood   Leuk Est   Nitrite   Protein   CREAT   Urine HCG        07/15/25 0958 Yellow   Turbid   Moderate (2+)   Large (3+)   Negative   30 mg/dL (1+)                   Microbiology Results Abnormal       Procedure Component Value - Date/Time    Urine Culture - Urine, Straight Cath [[id number]]  (Abnormal)  (Susceptibility) Collected: 07/15/25 0958    Lab Status: Final result Specimen: Urine from Straight Cath Updated: 07/17/25 1001     Urine Culture >100,000 CFU/mL Escherichia coli    Narrative:      Colonization of the urinary tract without infection is common. Treatment is discouraged unless the patient is symptomatic, pregnant, or undergoing an invasive urologic procedure.    Susceptibility        Escherichia coli      JAYJAY      Amoxicillin + Clavulanate Susceptible      Ampicillin Susceptible      Ampicillin + Sulbactam Susceptible      Cefazolin (Urine) Susceptible      Cefepime Susceptible      Ceftazidime Susceptible      Ceftriaxone Susceptible      Cefuroxime axetil Susceptible      Ciprofloxacin Resistant      Gentamicin Susceptible      Levofloxacin Resistant      Nitrofurantoin Susceptible      Piperacillin + Tazobactam Susceptible      Trimethoprim + Sulfamethoxazole Susceptible                                   XR Chest 1 View  Result Date: 7/17/2025  XR CHEST 1 VW Date of Exam: 7/17/2025 2:16 PM EDT Indication: SOB Comparison: 7/6/2023 Findings: Cardiac size is stable. Vascular markings are normal. There is marked elevation of the right hemidiaphragm with a large right-sided diaphragmatic hernia. There is increased consolidation in the right lung base compared with the prior study. The left lung  appears clear.     Impression: Impression: Marked elevation of the right hemidiaphragm with a large right-sided  diaphragmatic hernia. There is increased consolidation in the right lung base compared with the prior study. Electronically Signed: Gerry Milian MD  7/17/2025 2:52 PM EDT  Workstation ID: [id number]      Results for orders placed during the hospital encounter of 07/15/25    Adult Transthoracic Echo Complete W/ Cont if Necessary Per Protocol 07/17/2025  4:55 PM    Interpretation Summary    Left ventricular systolic function is normal. Left ventricular ejection fraction appears to be 61 - 65%.    Left ventricular wall thickness is consistent with mild concentric hypertrophy.    There is moderate calcification of the aortic valve.    Moderate mitral annular calcification is present.      Current medications:  Scheduled Meds:cefTRIAXone, 1,000 mg, Intravenous, Q24H  citalopram, 20 mg, Oral, Daily  docusate sodium, 100 mg, Oral, BID  dorzolamide, 1 drop, Both Eyes, Daily  doxycycline, 100 mg, Oral, Q12H  enoxaparin sodium, 30 mg, Subcutaneous, Daily  fluticasone, 2 spray, Nasal, Daily  midodrine, 5 mg, Oral, TID AC  primidone, 50 mg, Oral, TID  sodium chloride, 10 mL, Intravenous, Q12H  sodium chloride, 3 mL, Intravenous, Q12H  timolol, 1 drop, Both Eyes, Daily      Continuous Infusions:ropivacaine,       PRN Meds:.  acetaminophen **OR** acetaminophen **OR** acetaminophen    calcium carbonate    Calcium Replacement - Follow Nurse / BPA Driven Protocol    clonazePAM    docusate sodium    Magnesium Standard Dose Replacement - Follow Nurse / BPA Driven Protocol    Morphine **AND** naloxone    ondansetron ODT **OR** ondansetron    Phosphorus Replacement - Follow Nurse / BPA Driven Protocol    Potassium Replacement - Follow Nurse / BPA Driven Protocol    sodium chloride    sodium chloride    sodium chloride    sodium chloride    traMADol    Assessment & Plan   Assessment & Plan     Active Hospital Problems    Diagnosis  POA    **Hip fracture [S72.009A]  Yes      Resolved Hospital Problems   No resolved problems to display.         Brief Hospital Course to date:  Chula Chen is a 89 y.o. female  with past medical history significant for hypertension, essential tremors, history of COVID, and assisted living facility resident.  Evidently patient was in her usual state of health until earlier this morning when she woke up and tried to go to the bathroom using her walker.  In the bathroom evidently she lost the control of the walker and had a fall.     Acute hypoxemia with x-ray showing large hiatal hernia with right hemidiaphragm elevated pushing into mediastinum.  -Chest x-ray also shows some consolidation on the right lower lobe  -Patient is on Rocephin currently.  Will add doxycycline and monitor.     Status post fall and right hip fracture  - X-ray of the right hip shows subcapital right femoral neck fracture  - S/p surgery by Dr. Arellano with no immediate complications.    Low blood pressure  -We gave 500 cc bolus of normal saline.  Will also start the patient on midodrine to prevent very low pressure.    - Currently blood pressure is much better controlled.     Difficulty with urination  - UA shows large leukocyte Estrace  - Urine culture growing E. Coli, we will start the patient on Rocephin     Depression  - Patient is on Celexa and also clonazepam at home.  Will continue the home medication and monitor     Essential tremors  - Patient is on primidone at home which we will continue.         Expected Discharge Location and Transportation: rehab  Expected Discharge to be determined  Expected Discharge Date: 7/18/2025; Expected Discharge Time:      VTE Prophylaxis:  Pharmacologic & mechanical VTE prophylaxis orders are present.         AM-PAC 6 Clicks Score (PT): 10 (07/18/25 8657)    CODE STATUS:   Code Status and Medical Interventions: CPR (Attempt to Resuscitate); Full Support   Ordered at: 07/15/25 6717     Code Status (Patient has no pulse and is not breathing):    CPR (Attempt to Resuscitate)     Medical Interventions  (Patient has pulse or is breathing):    Full Support     Level Of Support Discussed With:    Patient       Willie Acuna MD  07/18/25

## 2025-07-18 NOTE — PLAN OF CARE
Problem: Adult Inpatient Plan of Care  Goal: Plan of Care Review  Recent Flowsheet Documentation  Taken 7/18/2025 1109 by Sarah Landaverde OT  Outcome Evaluation: Pt participates in therapy with good effort. Education reinforced for PHP with mobility and self-care. Transitions to EOB with Max Ax2. Pt able to stand and transfer x3 this session with BUE support and Max Ax2. Limited by activity tolerance deficits and fear of falling. Pt is dependent with LB ADLs and toileting this session. OT will continue to follow IP. Recommend return to SNF when pt is medically ready.

## 2025-07-18 NOTE — THERAPY TREATMENT NOTE
Patient Name: Chula Chen  : 1931    MRN: [id number]                              Today's Date: 2025       Admit Date: 7/15/2025    Visit Dx:     ICD-10-CM ICD-9-CM   1. Closed fracture of neck of right femur, initial encounter  S72.001A 820.8   2. Laceration without foreign body of scalp, initial encounter  S01.01XA 873.0     Patient Active Problem List   Diagnosis    Gastroesophageal reflux disease without esophagitis    Depression    Essential hypertension    Other insomnia    Benign essential tremor    Other constipation    Vitamin D deficiency    Urinary frequency    Pneumonia    COVID-19 virus detected    Cytokine release syndrome, grade 1    Hip fracture     Past Medical History:   Diagnosis Date    Cystocele, midline     Hemorrhoids      Past Surgical History:   Procedure Laterality Date    BREAST BIOPSY      cyst removed    CATARACT EXTRACTION Bilateral     HIP HEMIARTHROPLASTY Right 7/15/2025    Procedure: HIP HEMIARTHROPLASTY;  Surgeon: Salas Arellano MD;  Location: [facility];  Service: Orthopedics;  Laterality: Right;    THYROID SURGERY      nodule removal, Benign      General Information       Row Name 25 1120          Physical Therapy Time and Intention    Document Type therapy note (daily note)  -CD     Mode of Treatment physical therapy;co-treatment  -CD       Row Name 25 1120          General Information    Patient Profile Reviewed yes  -CD     Existing Precautions/Restrictions fall;right;hip, posterior;oxygen therapy device and L/min;other (see comments)  nerve catheter, desats with minimal activity. h/o scoliosis, leans to the R.  -CD     Barriers to Rehab medically complex;previous functional deficit  -CD       Row Name 25 1120          Cognition    Orientation Status (Cognition) oriented x 3  -CD       Row Name 25 1120          Safety Issues/Impairments Affecting Functional Mobility    Safety Issues Affecting Function (Mobility) insight  into deficits/self-awareness;safety precaution awareness;safety precautions follow-through/compliance;sequencing abilities;problem-solving  -CD     Impairments Affecting Function (Mobility) balance;endurance/activity tolerance;strength;postural/trunk control;range of motion (ROM);sensation/sensory awareness  -CD     Comment, Safety Issues/Impairments (Mobility) PT IS ALERT AND FOLLOWS COMMANDS. FEARFUL OF FALLING.  -CD               User Key  (r) = Recorded By, (t) = Taken By, (c) = Cosigned By      Initials Name Provider Type    CD Aletha Hilliard, PT Physical Therapist                   Mobility       Row Name 07/18/25 1122          Bed Mobility    Bed Mobility supine-sit  -CD     Scooting/Bridging [place] (Bed Mobility) maximum assist (25% patient effort);2 person assist  -CD     Supine-Sit [place] (Bed Mobility) maximum assist (25% patient effort);2 person assist  -CD     Assistive Device (Bed Mobility) bed rails;head of bed elevated  -CD     Comment, (Bed Mobility) INCREASED TIME AND EFFORT. REQUIRES MANUAL ASSIST TO UPRIGHT TRUNK AND MOVE LE'S OFF EOB, MAINTIANING PHP ON R. LEANS TO THE R UPON SITTING EOB, BUT DENIES DIZZINESS.  -CD       Row Name 07/18/25 1122          Transfers    Comment, (Transfers) CUES FOR HAND PLACMENT. REVIEWED PHP. COMPLETED STAND PIVOT TRANSFERS X 3 REPS (BED TO RECLINER TO BSC TO RECLINER). TOLERATED STS FROM COMMODE X 1 MINUTE FOR DEPENDENT HYGIENE AFTER BM.  -CD       Row Name 07/18/25 1122          Bed-Chair Transfer    Bed-Chair [place] (Transfers) maximum assist (25% patient effort);2 person assist  -CD     Assistive Device (Bed-Chair Transfers) --  B UE SUPPORT.  -CD     Comment, (Bed-Chair Transfer) PT REQUIRED MANUAL ASSIST FOR  LATERAL WT SHIFTING  AND MAX CUES TO LIFT  FEET FOR SIDE STEPS TO TURN AND SIT ON RECLINER/BSC. PT IS FEARFUL OF FALLING. PIVOT TRANSFERS COMPLETED TO THE LEFT VIA B UE SUPPORT.  -CD       Row Name 07/18/25 1122          Sit-Stand  Transfer    Sit-Stand [place] (Transfers) maximum assist (25% patient effort);verbal cues  -CD     Assistive Device (Sit-Stand Transfers) --  B UE SUPPORT.  -CD     Comment, (Sit-Stand Transfer) MAINTAINED STADNING X 1 MIN  VIA B UE SUPPORT TO ALLOW DEPENDENT HYGIENE AFTER BM.  -CD       Row Name 07/18/25 1122          Gait/Stairs (Locomotion)    [place] Level (Gait) other (see comments)  -CD     Comment, (Gait/Stairs) GAIT LIMITED TO SHUFFLED SIDESTEPPING X 3 FOR 3 REPS DURING PIVOT TRANSFERS VIA B UE SUPPORT WITH MANUAL ASSIST FOR LATERAL WT SHIFTING  -CD               User Key  (r) = Recorded By, (t) = Taken By, (c) = Cosigned By      Initials Name Provider Type    CD Aletha Hilliard PT Physical Therapist                   Obj/Interventions       Row Name 07/18/25 1134          Motor Skills    Therapeutic Exercise --  COMPLETED SEATED/SUPINE  B LE THER EX: QS, LAQ, AP'S X 10 REPS EACH.  -CD       Row Name 07/18/25 1134          Balance    Static Sitting Balance contact guard  -CD     Dynamic Sitting Balance minimal assist  -CD     Position, Sitting Balance supported;sitting in chair;sitting edge of bed  ON COMMODE.  -CD     Static Standing Balance maximum assist;verbal cues  -CD     Dynamic Standing Balance maximum assist;2-person assist  -CD     Position/Device Used, Standing Balance supported  B UE SUPPORT.  -CD     Balance Interventions sitting;standing;sit to stand;supported;static;dynamic;weight shifting activity  -CD     Comment, Balance LEANS HEAVILY TO THE R IN SITTING AND STANDING. HAS SCOLIOSIS. CAN CORRRECT TO MIDLINE WITH CUES. RELIES HEAVILY ON B UE SUPPORT FOR TRANSFERS AND IS FEARFUL OF FALLING.  -CD               User Key  (r) = Recorded By, (t) = Taken By, (c) = Cosigned By      Initials Name Provider Type    CD Aletha Hilliard PT Physical Therapist                   Goals/Plan    No documentation.                  Clinical Impression       Row Name 07/18/25 1139          Pain     Pretreatment Pain Rating 0/10 - no pain  INTRA PAIN 4/10 R HIP WITH MOBILITY  -CD     Posttreatment Pain Rating 0/10 - no pain  -CD     Pain Location hip  -CD     Pain Side/Orientation right  -CD     Pain Management Interventions activity modification encouraged;exercise or physical activity utilized;positioning techniques utilized  -CD     Response to Pain Interventions activity participation with tolerable pain  -CD       Row Name 07/18/25 1139          Plan of Care Review    Plan of Care Reviewed With patient;child  -CD     Progress improving  -CD     Outcome Evaluation PT IS FEARFUL OF FALLING AND NEEDS ENCOURAGEMENT, BUT GIVES GOOD EFFORT. COMPLETED STAND PIVOT TRANSFERS WITH MAX ASSIST OF 2 X 3 REPS VIA B UE SUPPORT AND MANUAL ASSIST FOR LATERAL WT SHIFTING TO ASSIST IN SHUFFLED SIDESTEPPING.  RECOMMEND SNF AT D/C.  -CD       Row Name 07/18/25 1139          Therapy Assessment/Plan (PT)    Rehab Potential (PT) fair  -CD     Criteria for Skilled Interventions Met (PT) yes;meets criteria;skilled treatment is necessary  -CD     Therapy Frequency (PT) daily  -CD       Row Name 07/18/25 1139          Vital Signs    Pre Systolic BP Rehab --  RN CLEARED FOR P.T.  -CD     Pre SpO2 (%) 94  -CD     O2 Delivery Pre Treatment nasal cannula  1L  -CD     Intra SpO2 (%) 84  -CD     O2 Delivery Intra Treatment nasal cannula  1L. INCREASED TO 3L FOR REMAINDER OF ACTIVITY AND REBOUNDED TO 96%.  -CD     Post SpO2 (%) 91  -CD     O2 Delivery Post Treatment nasal cannula  1L  -CD     Pre Patient Position Supine  -CD     Intra Patient Position Standing  -CD     Post Patient Position Sitting  -CD       Row Name 07/18/25 1139          Positioning and Restraints    Pre-Treatment Position in bed  -CD     Post Treatment Position chair  -CD     In Chair reclined;call light within reach;encouraged to call for assist;exit alarm on;legs elevated;heels elevated;LUE elevated;RUE elevated;notified nsg;waffle cushion;on mechanical lift  sling;pillow between legs  ABDUCTION PILLOW INTACT. NSG NOTIFIED OF BM.  -CD               User Key  (r) = Recorded By, (t) = Taken By, (c) = Cosigned By      Initials Name Provider Type    CD Aletha Hilliard, PT Physical Therapist                   Outcome Measures       Row Name 07/18/25 1145 07/18/25 0800       How much help from another person do you currently need...    Turning from your back to your side while in flat bed without using bedrails? 2  -CD 2  -MD    Moving from lying on back to sitting on the side of a flat bed without bedrails? 2  -CD 2  -MD    Moving to and from a bed to a chair (including a wheelchair)? 2  -CD 2  -MD    Standing up from a chair using your arms (e.g., wheelchair, bedside chair)? 2  -CD 2  -MD    Climbing 3-5 steps with a railing? 1  -CD 1  -MD    To walk in hospital room? 1  -CD 2  -MD    AM-PAC 6 Clicks Score (PT) 10  -CD 11  -MD    Highest Level of Mobility Goal Move to Chair/Commode-4  -CD Move to Chair/Commode-4  -MD      Row Name 07/18/25 0000          How much help from another person do you currently need...    Turning from your back to your side while in flat bed without using bedrails? 2  -TF     Moving from lying on back to sitting on the side of a flat bed without bedrails? 2  -TF     Moving to and from a bed to a chair (including a wheelchair)? 2  -TF     Standing up from a chair using your arms (e.g., wheelchair, bedside chair)? 2  -TF     Climbing 3-5 steps with a railing? 1  -TF     To walk in hospital room? 2  -TF     AM-PAC 6 Clicks Score (PT) 11  -TF     Highest Level of Mobility Goal Move to Chair/Commode-4  -TF       Row Name 07/18/25 1145 07/18/25 1113       Functional Assessment    Outcome Measure Options AM-PAC 6 Clicks Basic Mobility (PT)  -CD AM-PAC 6 Clicks Daily Activity (OT)  -TB              User Key  (r) = Recorded By, (t) = Taken By, (c) = Cosigned By      Initials Name Provider Type    Sarah Flores OT Occupational Therapist    CLAIRE  Aletha Hilliard, PT Physical Therapist    Lissett Thomas, RN Registered Nurse    Kathy Moffett RN Registered Nurse                                 Physical Therapy Education       Title: PT OT SLP Therapies (In Progress)       Topic: Physical Therapy (Done)       Point: Mobility training (Done)       Learning Progress Summary            Patient Acceptance, E, VU,NR by CD at 7/18/2025 1145    Comment: SEE FLOWSHEET    Eager, E, VU by SC at 7/17/2025 1027    Comment: reviewed benefits of walking    Acceptance, E,D, VU,NR by ES at 7/16/2025 1609    Comment: Post Hip Precautions next 6wks  WBAT RLE    Acceptance, E, VU,NR by BC at 7/16/2025 0800                      Point: Home exercise program (Done)       Learning Progress Summary            Patient Acceptance, E, VU,NR by CD at 7/18/2025 1145    Comment: SEE FLOWSHEET    Eager, E, VU by SC at 7/17/2025 1027    Comment: reviewed benefits of walking    Acceptance, E,D, VU,NR by ES at 7/16/2025 1609    Comment: Post Hip Precautions next 6wks  WBAT RLE    Acceptance, E, VU,NR by BC at 7/16/2025 0800                      Point: Body mechanics (Done)       Learning Progress Summary            Patient Acceptance, E, VU,NR by CD at 7/18/2025 1145    Comment: SEE FLOWSHEET    Eager, E, VU by SC at 7/17/2025 1027    Comment: reviewed benefits of walking    Acceptance, E,D, VU,NR by ES at 7/16/2025 1609    Comment: Post Hip Precautions next 6wks  WBAT RLE    Acceptance, E, VU,NR by BC at 7/16/2025 0800                      Point: Precautions (Done)       Learning Progress Summary            Patient Acceptance, E, VU,NR by CD at 7/18/2025 1145    Comment: SEE FLOWSHEET    Eager, E, VU by SC at 7/17/2025 1027    Comment: reviewed benefits of walking    Acceptance, E,D, VU,NR by ES at 7/16/2025 1609    Comment: Post Hip Precautions next 6wks  WBAT RLE    Acceptance, E, VU,NR by BC at 7/16/2025 0800                                      User Key       Initials Effective Dates  Name Provider Type Discipline    SC 02/03/23 -  Enzo La, PT Physical Therapist PT    CD 02/03/23 -  Aletha Hilliard, PT Physical Therapist PT    BC 04/27/21 -  Amanda Garay, RN Registered Nurse Nurse    ES 05/05/25 -  Sidra Corona, PT Student PT Student PT                  PT Recommendation and Plan     Progress: improving  Outcome Evaluation: PT IS FEARFUL OF FALLING AND NEEDS ENCOURAGEMENT, BUT GIVES GOOD EFFORT. COMPLETED STAND PIVOT TRANSFERS WITH MAX ASSIST OF 2 X 3 REPS VIA B UE SUPPORT AND MANUAL ASSIST FOR LATERAL WT SHIFTING TO ASSIST IN SHUFFLED SIDESTEPPING.  RECOMMEND SNF AT D/C.     Time Calculation:         PT Charges       Row Name 07/18/25 1147             Time Calculation    Start Time 0927  -CD      PT Received On 07/18/25  -CD         Time Calculation- PT    Total Timed Code Minutes- PT 23 minute(s)  -CD         Timed Charges    27884 - PT Therapeutic Exercise Minutes 8  -CD      86636 - PT Therapeutic Activity Minutes 15  -CD         Total Minutes    Timed Charges Total Minutes 23  -CD       Total Minutes 23  -CD                User Key  (r) = Recorded By, (t) = Taken By, (c) = Cosigned By      Initials Name Provider Type    CD Aletha Hilliard, PT Physical Therapist                  Therapy Charges for Today       Code Description Service Date Service Provider Modifiers Qty    49825688086 HC PT THER PROC EA 15 MIN 7/18/2025 Aletha Hilliard, PT GP 1    12644592174 HC PT THERAPEUTIC ACT EA 15 MIN 7/18/2025 Aletha Hilliard, PT GP 1            PT G-Codes  Outcome Measure Options: AM-PAC 6 Clicks Basic Mobility (PT)  AM-PAC 6 Clicks Score (PT): 10  AM-PAC 6 Clicks Score (OT): 11  PT Discharge Summary  Anticipated Discharge Disposition (PT): skilled nursing facility    Aletha Hilliadr, PT  7/18/2025

## 2025-07-18 NOTE — PROGRESS NOTES
[facility]    Acute pain service Inpatient Progress Note    Patient Name: Chula Chen  :  1931  MRN:  [id number]        Acute Pain  Service Inpatient Progress Note:    Analgesia:Good  LOC: alert and awake  Resp Status: room air  Cardiac: VS stable  Side Effects:None  Catheter Site:clean, dressing intact and dry  Cath type: peripheral nerve cath(InfuSystem)  Infusion rate: Fem/ Add: Basal: 1ml/hr, PIB: 8ml q 8h, PCA: 8ml q 30 min (1mL,8ml, 8ml InfuSystem Pump)  Catheter Plan:Catheter to remain Insitu and Continue catheter infusion rate unchanged  Comments:

## 2025-07-18 NOTE — CASE MANAGEMENT/SOCIAL WORK
Discharge Planning Assessment  [facility]     Patient Name: Chula Chen  MRN: [id number]  Today's Date: 7/18/2025    Admit Date: 7/15/2025    Plan: [place]/[facility] once MR   Discharge Needs Assessment    No documentation.                  Discharge Plan       Row Name 07/18/25 0950       Plan    Plan [place]/[facility] once MR    Patient/Family in Agreement with Plan yes    Plan Comments Patient has insurance approval for skilled bed at [facility], approved 7-17 - 7-21. Patient is not MR for transfer today. The [facility] CM will f/u on Saturday to check MR and nursing will need to arrange ambulance transportation.  I reached out to [facility] and updated.                  Continued Care and Services - Admitted Since 7/15/2025       Destination Coordination complete.      Service Provider Request Status Services Address Phone Fax Patient Preferred    [facility]  Selected Skilled Nursing [address] [facility] [address] [phone] --                  Expected Discharge Date and Time       Expected Discharge Date Expected Discharge Time    Jul 18, 2025            Demographic Summary    No documentation.                  Functional Status    No documentation.                  Psychosocial    No documentation.                  Abuse/Neglect    No documentation.                  Legal    No documentation.                  Substance Abuse    No documentation.                  Patient Forms    No documentation.                     Keiry Petty, RN

## 2025-07-18 NOTE — PLAN OF CARE
Goal Outcome Evaluation:  Plan of Care Reviewed With: patient, child        Progress: improving  Outcome Evaluation: PT IS FEARFUL OF FALLING AND NEEDS ENCOURAGEMENT, BUT GIVES GOOD EFFORT. COMPLETED STAND PIVOT TRANSFERS WITH MAX ASSIST OF 2 X 3 REPS VIA B UE SUPPORT AND MANUAL ASSIST FOR LATERAL WT SHIFTING TO ASSIST IN SHUFFLED SIDESTEPPING.  RECOMMEND SNF AT D/C.    Anticipated Discharge Disposition (PT): skilled nursing facility

## 2025-07-18 NOTE — PROGRESS NOTES
"          Orthopaedic Surgery Hip Fracture Post-Op Progress Note      LOS: 3 days   Patient Care Team:  Provider, No Known as PCP - General    POD 3    Subjective     Interval History:   Patient doing well this morning.  She tells me that she was able to take 6 steps yesterday.  She is asking me whether or not she had a concussion when she fell.  Does not admit to having headaches or light sensitivity.  She is not amnestic to the fall.    Objective     Vital Signs:  Temp (24hrs), Av.3 °F (36.8 °C), Min:98.2 °F (36.8 °C), Max:98.3 °F (36.8 °C)    /74 (BP Location: Right arm, Patient Position: Lying)   Pulse 79   Temp 98.3 °F (36.8 °C) (Oral)   Resp 16   Ht 149.9 cm (59.02\")   Wt 48.5 kg (106 lb 14.8 oz)   SpO2 99%   BMI 21.58 kg/m²     Labs:  Lab Results (last 24 hours)       Procedure Component Value Units Date/Time    Urine Culture - Urine, Straight Cath [[id number]]  (Abnormal)  (Susceptibility) Collected: 07/15/25 0958    Specimen: Urine from Straight Cath Updated: 25 1001     Urine Culture >100,000 CFU/mL Escherichia coli    Narrative:      Colonization of the urinary tract without infection is common. Treatment is discouraged unless the patient is symptomatic, pregnant, or undergoing an invasive urologic procedure.    Susceptibility        Escherichia coli      JAYJAY      Amoxicillin + Clavulanate Susceptible      Ampicillin Susceptible      Ampicillin + Sulbactam Susceptible      Cefazolin (Urine) Susceptible      Cefepime Susceptible      Ceftazidime Susceptible      Ceftriaxone Susceptible      Cefuroxime axetil Susceptible      Ciprofloxacin Resistant      Gentamicin Susceptible      Levofloxacin Resistant      Nitrofurantoin Susceptible      Piperacillin + Tazobactam Susceptible      Trimethoprim + Sulfamethoxazole Susceptible                           CBC & Differential [[id number]]  (Abnormal) Collected: 25 0615    Specimen: Blood Updated: 25 0734    Narrative:      The " following orders were created for panel order CBC & Differential.  Procedure                               Abnormality         Status                     ---------                               -----------         ------                     CBC Auto Differential[[id number]]        Abnormal            Final result                 Please view results for these tests on the individual orders.    CBC Auto Differential [[id number]]  (Abnormal) Collected: 07/17/25 0615    Specimen: Blood Updated: 07/17/25 0734     WBC 14.15 10*3/mm3      RBC 3.37 10*6/mm3      Hemoglobin 9.5 g/dL      Hematocrit 28.9 %      MCV 85.8 fL      MCH 28.2 pg      MCHC 32.9 g/dL      RDW 14.7 %      RDW-SD 46.1 fl      MPV 10.2 fL      Platelets 308 10*3/mm3      Neutrophil % 64.3 %      Lymphocyte % 21.7 %      Monocyte % 11.9 %      Eosinophil % 1.4 %      Basophil % 0.2 %      Immature Grans % 0.5 %      Neutrophils, Absolute 9.09 10*3/mm3      Lymphocytes, Absolute 3.07 10*3/mm3      Monocytes, Absolute 1.69 10*3/mm3      Eosinophils, Absolute 0.20 10*3/mm3      Basophils, Absolute 0.03 10*3/mm3      Immature Grans, Absolute 0.07 10*3/mm3      nRBC 0.0 /100 WBC             Physical Exam:  EHL, FHL, gastroc soleus, and tibialis anterior are intact  Toes are pink and warm  Surgical dressing is in place  Calf is soft and nontender  No pain with gentle ROM of the hip    Assessment & Plan     Postoperative day number 3 status post right hip bipolar cemented hemiarthroplasty.    Pain Control: Good overall.  Continue fascia iliaca block until discharged    PT and OT     Weight Bearing Status: Weight-bear as tolerated right lower extremity with posterior precautions.    DVT prophylaxis: Low-dose Lovenox until discharge followed by baby aspirin twice daily for 4 weeks    Discharge planning: Anticipate discharge back to the [facility].  She is ready from an orthopedic standpoint to begin rehab.    Upon discharge, patient will need follow-up with  me in the PA clinic in 2-3 weeks' time.  Continue DVTpx for 4 weeks.  Continue Aquacel dressing for 1 week after surgery and then remove and replace with a Covaderm or dry dressing every other day.  Keep incision covered at all times.    Call with questions or concerns.      Salas Arellano MD  07/18/25  07:16 EDT

## 2025-07-18 NOTE — PLAN OF CARE
Problem: Adult Inpatient Plan of Care  Goal: Plan of Care Review  Outcome: Progressing  Goal: Patient-Specific Goal (Individualized)  Outcome: Progressing  Goal: Absence of Hospital-Acquired Illness or Injury  Outcome: Progressing  Intervention: Identify and Manage Fall Risk  Recent Flowsheet Documentation  Taken 7/18/2025 0000 by Kathy Hoang RN  Safety Promotion/Fall Prevention:   fall prevention program maintained   activity supervised  Taken 7/17/2025 2000 by Kathy Hoang RN  Safety Promotion/Fall Prevention:   fall prevention program maintained   activity supervised  Intervention: Prevent Skin Injury  Recent Flowsheet Documentation  Taken 7/18/2025 0000 by Kathy Hoang RN  Body Position: position maintained  Skin Protection: incontinence pads utilized  Taken 7/17/2025 2000 by Kathy Hoang RN  Body Position: position maintained  Skin Protection: incontinence pads utilized  Intervention: Prevent and Manage VTE (Venous Thromboembolism) Risk  Recent Flowsheet Documentation  Taken 7/18/2025 0000 by Kathy Hoang RN  VTE Prevention/Management: other (see comments)  Taken 7/17/2025 2000 by Kathy Hoang RN  VTE Prevention/Management: (lovenox) other (see comments)  Intervention: Prevent Infection  Recent Flowsheet Documentation  Taken 7/18/2025 0000 by Kathy Hoang RN  Infection Prevention: rest/sleep promoted  Taken 7/17/2025 2000 by Kathy Hoang RN  Infection Prevention: rest/sleep promoted  Goal: Optimal Comfort and Wellbeing  Outcome: Progressing  Intervention: Monitor Pain and Promote Comfort  Recent Flowsheet Documentation  Taken 7/18/2025 0000 by Kathy Hoang RN  Pain Management Interventions: quiet environment facilitated  Taken 7/17/2025 2000 by Kathy Hoang RN  Pain Management Interventions: quiet environment facilitated  Intervention: Provide Person-Centered Care  Recent Flowsheet Documentation  Taken 7/18/2025 0000 by Kathy Hoang RN  Trust Relationship/Rapport:   care explained    thoughts/feelings acknowledged  Taken 7/17/2025 2000 by Kathy Hoang, RN  Trust Relationship/Rapport:   care explained   thoughts/feelings acknowledged  Goal: Readiness for Transition of Care  Outcome: Progressing   Goal Outcome Evaluation:

## 2025-07-19 VITALS
HEIGHT: 59 IN | WEIGHT: 106.92 LBS | RESPIRATION RATE: 16 BRPM | DIASTOLIC BLOOD PRESSURE: 88 MMHG | TEMPERATURE: 97.8 F | BODY MASS INDEX: 21.56 KG/M2 | OXYGEN SATURATION: 95 % | HEART RATE: 86 BPM | SYSTOLIC BLOOD PRESSURE: 156 MMHG

## 2025-07-19 LAB
ANION GAP SERPL CALCULATED.3IONS-SCNC: 10 MMOL/L (ref 5–15)
BASOPHILS # BLD AUTO: 0.04 10*3/MM3 (ref 0–0.2)
BASOPHILS NFR BLD AUTO: 0.4 % (ref 0–1.5)
BUN SERPL-MCNC: 11.2 MG/DL (ref 8–23)
BUN/CREAT SERPL: 36.1 (ref 7–25)
CALCIUM SPEC-SCNC: 7.9 MG/DL (ref 8.2–9.6)
CHLORIDE SERPL-SCNC: 100 MMOL/L (ref 98–107)
CO2 SERPL-SCNC: 25 MMOL/L (ref 22–29)
CREAT SERPL-MCNC: 0.31 MG/DL (ref 0.57–1)
DEPRECATED RDW RBC AUTO: 45.1 FL (ref 37–54)
EGFRCR SERPLBLD CKD-EPI 2021: 97.7 ML/MIN/1.73
EOSINOPHIL # BLD AUTO: 0.17 10*3/MM3 (ref 0–0.4)
EOSINOPHIL NFR BLD AUTO: 1.6 % (ref 0.3–6.2)
ERYTHROCYTE [DISTWIDTH] IN BLOOD BY AUTOMATED COUNT: 14.6 % (ref 12.3–15.4)
GLUCOSE SERPL-MCNC: 121 MG/DL (ref 65–99)
HCT VFR BLD AUTO: 31.1 % (ref 34–46.6)
HGB BLD-MCNC: 10 G/DL (ref 12–15.9)
IMM GRANULOCYTES # BLD AUTO: 0.06 10*3/MM3 (ref 0–0.05)
IMM GRANULOCYTES NFR BLD AUTO: 0.6 % (ref 0–0.5)
LYMPHOCYTES # BLD AUTO: 2.7 10*3/MM3 (ref 0.7–3.1)
LYMPHOCYTES NFR BLD AUTO: 24.8 % (ref 19.6–45.3)
MCH RBC QN AUTO: 27.2 PG (ref 26.6–33)
MCHC RBC AUTO-ENTMCNC: 32.2 G/DL (ref 31.5–35.7)
MCV RBC AUTO: 84.7 FL (ref 79–97)
MONOCYTES # BLD AUTO: 1.17 10*3/MM3 (ref 0.1–0.9)
MONOCYTES NFR BLD AUTO: 10.7 % (ref 5–12)
NEUTROPHILS NFR BLD AUTO: 6.76 10*3/MM3 (ref 1.7–7)
NEUTROPHILS NFR BLD AUTO: 61.9 % (ref 42.7–76)
NRBC BLD AUTO-RTO: 0 /100 WBC (ref 0–0.2)
PLATELET # BLD AUTO: 384 10*3/MM3 (ref 140–450)
PMV BLD AUTO: 9.6 FL (ref 6–12)
POTASSIUM SERPL-SCNC: 2.9 MMOL/L (ref 3.5–5.2)
RBC # BLD AUTO: 3.67 10*6/MM3 (ref 3.77–5.28)
SODIUM SERPL-SCNC: 135 MMOL/L (ref 136–145)
WBC NRBC COR # BLD AUTO: 10.9 10*3/MM3 (ref 3.4–10.8)

## 2025-07-19 PROCEDURE — 85025 COMPLETE CBC W/AUTO DIFF WBC: CPT | Performed by: INTERNAL MEDICINE

## 2025-07-19 PROCEDURE — 99239 HOSP IP/OBS DSCHRG MGMT >30: CPT | Performed by: INTERNAL MEDICINE

## 2025-07-19 PROCEDURE — 99024 POSTOP FOLLOW-UP VISIT: CPT | Performed by: ORTHOPAEDIC SURGERY

## 2025-07-19 PROCEDURE — 80048 BASIC METABOLIC PNL TOTAL CA: CPT | Performed by: INTERNAL MEDICINE

## 2025-07-19 PROCEDURE — 25010000002 ENOXAPARIN PER 10 MG: Performed by: ORTHOPAEDIC SURGERY

## 2025-07-19 RX ORDER — DOXYCYCLINE 100 MG/1
100 CAPSULE ORAL EVERY 12 HOURS SCHEDULED
Qty: 5 CAPSULE | Refills: 0 | Status: SHIPPED | OUTPATIENT
Start: 2025-07-19 | End: 2025-07-22

## 2025-07-19 RX ORDER — TRAMADOL HYDROCHLORIDE 50 MG/1
50 TABLET ORAL EVERY 6 HOURS PRN
Qty: 12 TABLET | Refills: 0 | Status: SHIPPED | OUTPATIENT
Start: 2025-07-19 | End: 2025-07-22

## 2025-07-19 RX ORDER — HYDROCODONE BITARTRATE AND ACETAMINOPHEN 5; 325 MG/1; MG/1
1 TABLET ORAL EVERY 6 HOURS PRN
Qty: 12 TABLET | Refills: 0 | Status: SHIPPED | OUTPATIENT
Start: 2025-07-19

## 2025-07-19 RX ORDER — POTASSIUM CHLORIDE 1500 MG/1
40 TABLET, EXTENDED RELEASE ORAL EVERY 4 HOURS
Status: COMPLETED | OUTPATIENT
Start: 2025-07-19 | End: 2025-07-19

## 2025-07-19 RX ORDER — ASPIRIN 81 MG/1
81 TABLET ORAL 2 TIMES DAILY
Qty: 60 TABLET | Refills: 0 | Status: SHIPPED | OUTPATIENT
Start: 2025-07-19 | End: 2025-08-18

## 2025-07-19 RX ADMIN — DOCUSATE SODIUM 100 MG: 100 CAPSULE, LIQUID FILLED ORAL at 08:56

## 2025-07-19 RX ADMIN — DOXYCYCLINE 100 MG: 100 CAPSULE ORAL at 08:55

## 2025-07-19 RX ADMIN — POTASSIUM CHLORIDE 40 MEQ: 1500 TABLET, EXTENDED RELEASE ORAL at 16:44

## 2025-07-19 RX ADMIN — POTASSIUM CHLORIDE 40 MEQ: 1500 TABLET, EXTENDED RELEASE ORAL at 08:56

## 2025-07-19 RX ADMIN — TIMOLOL MALEATE 1 DROP: 5 SOLUTION OPHTHALMIC at 08:59

## 2025-07-19 RX ADMIN — PRIMIDONE 50 MG: 50 TABLET ORAL at 08:55

## 2025-07-19 RX ADMIN — CITALOPRAM HYDROBROMIDE 20 MG: 20 TABLET ORAL at 08:56

## 2025-07-19 RX ADMIN — POTASSIUM CHLORIDE 40 MEQ: 1500 TABLET, EXTENDED RELEASE ORAL at 13:40

## 2025-07-19 RX ADMIN — ENOXAPARIN SODIUM 30 MG: 100 INJECTION SUBCUTANEOUS at 08:56

## 2025-07-19 RX ADMIN — FLUTICASONE PROPIONATE 2 SPRAY: 50 SPRAY, METERED NASAL at 08:56

## 2025-07-19 RX ADMIN — PRIMIDONE 50 MG: 50 TABLET ORAL at 16:44

## 2025-07-19 NOTE — DISCHARGE SUMMARY
[facility]  DISCHARGE SUMMARY    Patient Name: Chula Chen  : 1931  MRN: [id number]    Date of Admission: 7/15/2025 12:58 AM  Date of Discharge:  25  Primary Care Physician: Provider, No Known    Consults       No orders found from 2025 to 2025.            Hospital Course     Presenting Problem: Fall and right hip fracture    Active Hospital Problems    Diagnosis  POA    **Hip fracture [S72.009A]  Yes      Resolved Hospital Problems   No resolved problems to display.          Hospital Course:  Chula Chen is a 89 y.o. female   with past medical history significant for hypertension, essential tremors, history of COVID, history of right large hiatal hernia, an assisted living facility resident.  Evidently patient was in her usual state of health until  morning of 7/15/25  when she woke up and tried to go to the bathroom using her walker.  In the bathroom evidently she lost the control of the walker and had a fall.      Acute hypoxemia with x-ray showing large hiatal hernia with right hemidiaphragm elevated pushing into mediastinum.  -Chest x-ray also shows some consolidation on the right lower lobe  - Has been on Rocephin and doxycycline.  -Currently patient is on room air and saturating in low 90s.  If patient develops hypoxemia or dyspnea she should use oxygen.  This hiatal hernia and right hemidiaphragm elevation is a significant contributor to the symptoms.  However, patient is not going to tolerate or agree with surgical procedure.     Status post fall and right hip fracture  - X-ray of the right hip shows subcapital right femoral neck fracture  - S/p surgery by Dr. Arellano with no immediate complications.  -Patient is to be anticoagulated with 81 mg aspirin twice daily for a month     Low blood pressure  -We gave 500 cc bolus of normal saline.  Will also start the patient on midodrine to prevent very low pressure.    - Currently  blood pressure is much better controlled.  Will discontinue midodrine for discharge     Difficulty with urination  - UA shows large leukocyte Estrace  - Urine culture growing E. Coli, patient treated with Rocephin     Depression  - Patient is on Celexa and also clonazepam at home.  Will continue the home medication and monitor     Essential tremors  - Patient is on primidone at home which we will continue.      Discharge Follow Up Recommendations for outpatient labs/diagnostics:       Day of Discharge     HPI:   Resting in bed no acute distress and feels okay except she is a little more depressed this morning.  No fever or chills.  No chest pain or palpitation.  No nausea or vomiting.  Patient's daughter is also present at the bedside and I explained the plan of care to her.    Review of Systems  As above    Vital Signs:   Temp:  [97.6 °F (36.4 °C)-98.6 °F (37 °C)] 97.8 °F (36.6 °C)  Heart Rate:  [81-99] 87  Resp:  [16] 16  BP: (134-164)/(65-93) 159/85  Flow (L/min) (Oxygen Therapy):  [1-3] 1      Physical Exam:  Constitutional: No acute distress, looks comfortable  HENT: NCAT, mucous membranes moist  Respiratory: Clear to auscultation bilaterally, respiratory effort normal, currently on room air.  Cardiovascular: RRR, no murmurs, rubs, or gallops  Gastrointestinal: Positive bowel sounds, soft, nontender, nondistended  Musculoskeletal: No bilateral ankle edema  Psychiatric: Flat affect, cooperative  Neurologic: Awake, alert, follows commands,  speech clear  Skin: No rashes     Pertinent  and/or Most Recent Results     LAB RESULTS:      Lab 07/19/25  0700 07/17/25  0615 07/16/25  0836 07/15/25  0248   WBC 10.90* 14.15* 11.99* 11.97*   HEMOGLOBIN 10.0* 9.5* 10.2* 13.3   HEMATOCRIT 31.1* 28.9* 31.1* 40.2   PLATELETS 384 308 319 432   NEUTROS ABS 6.76 9.09* 9.31* 8.06*   IMMATURE GRANS (ABS) 0.06* 0.07* 0.04 0.05   LYMPHS ABS 2.70 3.07 1.63 2.69   MONOS ABS 1.17* 1.69* 0.86 0.93*   EOS ABS 0.17 0.20 0.12 0.21   MCV 84.7  85.8 83.8 83.4         Lab 07/19/25  0700 07/16/25  0836 07/15/25  1105 07/15/25  0248   SODIUM 135* 138  --  134*   POTASSIUM 2.9* 3.7  --  3.8   CHLORIDE 100 105  --  100   CO2 25.0 22.1  --  22.0   ANION GAP 10.0 10.9  --  12.0   BUN 11.2 9.9  --  18.5   CREATININE 0.31* 0.53*  --  0.49*   EGFR 97.7 85.8  --  87.5   GLUCOSE 121* 170*  --  125*   CALCIUM 7.9* 8.0*  --  9.2   MAGNESIUM  --   --  2.0  --    PHOSPHORUS  --   --  2.5  --          Lab 07/15/25  0248   TOTAL PROTEIN 6.9   ALBUMIN 3.3*   GLOBULIN 3.6   ALT (SGPT) 87*   AST (SGOT) 36*   BILIRUBIN 0.2   ALK PHOS 152*                     Brief Urine Lab Results  (Last result in the past 365 days)        Color   Clarity   Blood   Leuk Est   Nitrite   Protein   CREAT   Urine HCG        07/15/25 0958 Yellow   Turbid   Moderate (2+)   Large (3+)   Negative   30 mg/dL (1+)                 Microbiology Results (last 10 days)       Procedure Component Value - Date/Time    Urine Culture - Urine, Straight Cath [[id number]]  (Abnormal)  (Susceptibility) Collected: 07/15/25 0958    Lab Status: Final result Specimen: Urine from Straight Cath Updated: 07/17/25 1001     Urine Culture >100,000 CFU/mL Escherichia coli    Narrative:      Colonization of the urinary tract without infection is common. Treatment is discouraged unless the patient is symptomatic, pregnant, or undergoing an invasive urologic procedure.    Susceptibility        Escherichia coli      JAYJAY      Amoxicillin + Clavulanate Susceptible      Ampicillin Susceptible      Ampicillin + Sulbactam Susceptible      Cefazolin (Urine) Susceptible      Cefepime Susceptible      Ceftazidime Susceptible      Ceftriaxone Susceptible      Cefuroxime axetil Susceptible      Ciprofloxacin Resistant      Gentamicin Susceptible      Levofloxacin Resistant      Nitrofurantoin Susceptible      Piperacillin + Tazobactam Susceptible      Trimethoprim + Sulfamethoxazole Susceptible                                   XR Chest 1  View  Result Date: 7/17/2025  XR CHEST 1 VW Date of Exam: 7/17/2025 2:16 PM EDT Indication: SOB Comparison: 7/6/2023 Findings: Cardiac size is stable. Vascular markings are normal. There is marked elevation of the right hemidiaphragm with a large right-sided diaphragmatic hernia. There is increased consolidation in the right lung base compared with the prior study. The left lung  appears clear.     Impression: Marked elevation of the right hemidiaphragm with a large right-sided diaphragmatic hernia. There is increased consolidation in the right lung base compared with the prior study. Electronically Signed: Gerry Milian MD  7/17/2025 2:52 PM EDT  Workstation ID: [id number]    XR Hip With or Without Pelvis 2 - 3 View Right  Result Date: 7/15/2025  XR HIP W OR WO PELVIS 2-3 VIEW RIGHT Date of Exam: 7/15/2025 6:50 PM EDT Indication: Post-Op Hip Arthroplasty Comparison: Hip and pelvis radiographs 7/15/2025 Findings: Interval right hip arthroplasty without radiographic evidence of immediate complication. Surrounding soft tissue edema and subcutaneous emphysema, favored postoperative. Degenerative changes of the sacroiliac joints, left hip, and pubic symphysis appear unchanged.     Impression: Interval right hip arthroplasty without radiographic evidence of immediate complication. Electronically Signed: Andrez Martinez MD  7/15/2025 7:12 PM EDT  Workstation ID: [id number]    RIGHt FI cath  Result Date: 7/15/2025  Oren Wiggins CRNA     7/15/2025  1:22 PM RIGHt FI cath Patient reassessed immediately prior to procedure Reason for block: at surgeon's request and post-op pain management Performed by CRNA/CAA: Oren Wiggins CRNA Assisted by: Genevieve Floyd RN Preanesthetic Checklist Completed: patient identified, IV checked, site marked, risks and benefits discussed, surgical consent, monitors and equipment checked, pre-op evaluation and timeout performed Prep: Pt Position: supine Sterile barriers:cap, gloves, mask and  washed/disinfected hands Prep: ChloraPrep Patient monitoring: blood pressure monitoring, continuous pulse oximetry and EKG Procedure Sedation: no Performed under: local infiltration Guidance:ultrasound guided ULTRASOUND INTERPRETATION.  Using ultrasound guidance a 20 G gauge needle was placed in close proximity to the nerve, at which point, under ultrasound guidance anesthetic was injected in the area of the nerve and spread of the anesthesia was seen on ultrasound in close proximity thereto.  There were no abnormalities seen on ultrasound; a digital image was taken; and the patient tolerated the procedure with no complications. Images:still images obtained, printed/placed on chart Laterality:right Block Type:fascia iliaca compartment Injection Technique:catheter Needle Type:echogenic and Tuohy Needle Gauge:18 G Resistance on Injection: none Catheter Size:20 G (20g) Cath Depth at skin: 11 cm Post Assessment Injection Assessment: negative aspiration for heme, no paresthesia on injection and incremental injection Patient Tolerance:comfortable throughout block Complications:no Additional Notes CKAFASCIAILIACA: CATHETER A high-frequency linear transducer, with sterile cover, was placed in parasagittal plane on top of the Anterior Superior Iliac Spine (ASIS) and moved medially to identify the Internal Oblique muscle, Sartorius muscle, Iliacus Muscle, Fascia Iliaca (FI) and Fascia Latae. The insertion site was prepped and draped in sterile fashion. Skin and cutaneous tissue was infiltrated with 2-5 ml of 1% Lidocaine. Using ultrasound-guidance, an 18-gauge Contiplex Ultra 360 Touhy needle was advanced in plane from caudad to cephalad. Preservative-free normal saline was utilized for hydro-dissection of tissue, advancement of Touhy, and to confirm final needle placement below FI. Local anesthetic in incremental 3-5 ml injections. Aspiration every 5 ml to prevent intravascular injection. Injection was completed with  "negative aspiration of blood and negative intravascular injection. Injection pressures were normal with minimal resistance. A 20-gauge Contiplex Echo catheter was placed through the needle and advance out the tip of the Touhy 3-5 cm. The Touhy needle was then removed, and final catheter position verified below the FI. The catheter was secured in the usual fashion with skin glue, benzoin, steri-strips, CHG tegaderm and Label noting \"Nerve Block Catheter\". Jerk tape applied at yellow connector and catheter connection. Performed by: Oren Wiggins CRNA     CT Head Without Contrast  Result Date: 7/15/2025  CT HEAD WO CONTRAST Date of Exam: 7/15/2025 1:55 AM EDT Indication: head injury. Comparison: None available. Technique: Axial CT images were obtained of the head without contrast administration.  Automated exposure control and iterative construction methods were used. Findings: There is mild diffuse generalized atrophy. There are low-attenuation areas in the periventricular white matter consistent with chronic microvascular ischemic change. There is no mass, mass effect or midline shift. There are no abnormal extra-axial fluid collections or areas of acute hemorrhage. There is mild left maxillary sinus disease. The mastoid air cells are clear. There is a skin laceration in the right lateral frontal region with no radiopaque foreign body or underlying bony abnormality.     Impression: Atrophy and chronic microvascular ischemic change. No acute intracranial process. Electronically Signed: Dav Reed MD  7/15/2025 2:41 AM EDT  Workstation ID: [id number]    XR Hip With or Without Pelvis 2 - 3 View Right  Result Date: 7/15/2025  XR HIP W OR WO PELVIS 2-3 VIEW RIGHT Date of Exam: 7/15/2025 1:40 AM EDT Indication: hip injury Comparison: None available. Findings: There is an abnormal appearance at the subcapital portion of the right femur suspected to represent a fracture. There is bilateral hip arthritic change. There are no " lytic or destructive bony lesions.     Impression: Suspected subcapital right femoral neck fracture. Electronically Signed: Dav Reed MD  7/15/2025 2:23 AM EDT  Workstation ID: [id number]              Results for orders placed during the hospital encounter of 07/15/25    Adult Transthoracic Echo Complete W/ Cont if Necessary Per Protocol 07/17/2025  4:55 PM    Interpretation Summary    Left ventricular systolic function is normal. Left ventricular ejection fraction appears to be 61 - 65%.    Left ventricular wall thickness is consistent with mild concentric hypertrophy.    There is moderate calcification of the aortic valve.    Moderate mitral annular calcification is present.      Plan for Follow-up of Pending Labs/Results:     Discharge Details        Discharge Medications        Continue These Medications        Instructions Start Date   acetaminophen 325 MG tablet  Commonly known as: TYLENOL   325 mg, Oral, Every 6 Hours PRN      benzonatate 100 MG capsule  Commonly known as: TESSALON   100 mg, Oral, 3 Times Daily PRN      bimatoprost 0.01 % ophthalmic drops  Commonly known as: LUMIGAN   1 drop, Both Eyes, Nightly      calcium carbonate 500 MG chewable tablet  Commonly known as: TUMS   1 tablet, Oral, 3 Times Daily PRN, OTC      cholecalciferol 25 MCG (1000 UT) tablet  Commonly known as: VITAMIN D3   1,000 Units, Oral, Daily, OTC      citalopram 40 MG tablet  Commonly known as: CeleXA   20 mg, Oral, Daily      clonazePAM 0.5 MG tablet  Commonly known as: KlonoPIN   0.5 mg, Oral, Nightly PRN      docusate sodium 100 MG capsule  Commonly known as: COLACE   100 mg, Oral, 2 Times Daily, OTC      dorzolamide-timolol 2-0.5 % ophthalmic solution  Commonly known as: COSOPT   1 drop, Both Eyes, 2 Times Daily      fluticasone 50 MCG/ACT nasal spray  Commonly known as: FLONASE   2 sprays, Nasal, Daily, OTC      guaiFENesin 600 MG 12 hr tablet  Commonly known as: MUCINEX   600 mg, Oral, 2 Times Daily, OTC      Loratadine  10 MG capsule   10 mg, Oral, Daily, OTC      Multivitamin Adults tablet tablet  Generic drug: multivitamin with minerals   1 tablet, Oral, Daily, OTC      olopatadine 0.2 % solution ophthalmic solution  Commonly known as: PATADAY   1 drop, Right Eye, Daily      ondansetron ODT 4 MG disintegrating tablet  Commonly known as: ZOFRAN-ODT   4 mg, Translingual, 4 Times Daily PRN      primidone 50 MG tablet  Commonly known as: MYSOLINE   50 mg, Oral, 3 Times Daily      traZODone 50 MG tablet  Commonly known as: DESYREL    ASA 81 MG tablet    Doxycyclin 100 mg    Hydrocodone-acetaminophen      Tramadol 50 mg      50 mg, Oral, Nightly      81 mg PO BID for 30 days    Po q 12 hours 5 doses    5-325 mg po q 6hours PRN for severe pain    50 mg po q 6 hours prn for pain                         No Known Allergies      Discharge Disposition:      Diet:  Hospital:  Diet Order   Procedures    Diet: Regular/House; Texture: Soft to Chew (NDD 3); Soft to Chew: Chopped Meat; Fluid Consistency: Thin (IDDSI 0)            Activity:  As per instruction of orthopedic surgery    Restrictions or Other Recommendations:         CODE STATUS:    Code Status and Medical Interventions: CPR (Attempt to Resuscitate); Full Support   Ordered at: 07/15/25 1948     Code Status (Patient has no pulse and is not breathing):    CPR (Attempt to Resuscitate)     Medical Interventions (Patient has pulse or is breathing):    Full Support     Level Of Support Discussed With:    Patient       No future appointments.              Willie Acuna MD  07/19/25      Time Spent on Discharge:  I spent  40  minutes on this discharge activity which included: face-to-face encounter with the patient, reviewing the data in the system, coordination of the care with the nursing staff as well as consultants, documentation, and entering orders.

## 2025-07-19 NOTE — PLAN OF CARE
Problem: Adult Inpatient Plan of Care  Goal: Plan of Care Review  7/19/2025 1537 by Judi Galeana RN  Outcome: Adequate for Care Transition  7/19/2025 1536 by Judi Galeana RN  Outcome: Progressing  Goal: Patient-Specific Goal (Individualized)  7/19/2025 1537 by Judi Galeana RN  Outcome: Adequate for Care Transition  7/19/2025 1536 by Judi Galeana RN  Outcome: Progressing  Goal: Absence of Hospital-Acquired Illness or Injury  7/19/2025 1537 by Judi Galeana RN  Outcome: Adequate for Care Transition  7/19/2025 1536 by Judi Galeana RN  Outcome: Progressing  Intervention: Identify and Manage Fall Risk  Recent Flowsheet Documentation  Taken 7/19/2025 1405 by Judi Galeana RN  Safety Promotion/Fall Prevention:   activity supervised   assistive device/personal items within reach   clutter free environment maintained   elopement precautions   fall prevention program maintained   gait belt   lighting adjusted   mobility aid in reach   muscle strengthening facilitated   nonskid shoes/slippers when out of bed   room organization consistent   safety round/check completed   toileting scheduled  Taken 7/19/2025 1220 by Judi Galeana RN  Safety Promotion/Fall Prevention:   activity supervised   assistive device/personal items within reach   clutter free environment maintained   elopement precautions   fall prevention program maintained   gait belt   lighting adjusted   mobility aid in reach   muscle strengthening facilitated   nonskid shoes/slippers when out of bed   room organization consistent   safety round/check completed   toileting scheduled  Taken 7/19/2025 1005 by Judi Galeana RN  Safety Promotion/Fall Prevention:   activity supervised   assistive device/personal items within reach   clutter free environment maintained   elopement precautions   fall prevention program maintained   gait belt   lighting adjusted   mobility aid in reach   muscle strengthening facilitated   nonskid shoes/slippers when  out of bed   room organization consistent   safety round/check completed   toileting scheduled  Taken 7/19/2025 0850 by Judi Galeana RN  Safety Promotion/Fall Prevention:   activity supervised   assistive device/personal items within reach   clutter free environment maintained   elopement precautions   fall prevention program maintained   gait belt   lighting adjusted   mobility aid in reach   muscle strengthening facilitated   nonskid shoes/slippers when out of bed   room organization consistent   safety round/check completed   toileting scheduled  Intervention: Prevent Skin Injury  Recent Flowsheet Documentation  Taken 7/19/2025 1500 by Judi Galeana RN  Body Position:   right   turned  Taken 7/19/2025 1405 by Judi Galeana RN  Body Position: position maintained  Skin Protection:   incontinence pads utilized   silicone foam dressing in place   transparent dressing maintained  Taken 7/19/2025 1220 by Judi Galeana RN  Body Position: position maintained  Skin Protection:   incontinence pads utilized   silicone foam dressing in place   transparent dressing maintained  Taken 7/19/2025 1005 by Judi Galeana RN  Body Position: position maintained  Skin Protection:   incontinence pads utilized   silicone foam dressing in place   transparent dressing maintained  Taken 7/19/2025 0850 by Judi Galeana RN  Body Position: position maintained  Skin Protection:   incontinence pads utilized   silicone foam dressing in place   transparent dressing maintained  Intervention: Prevent and Manage VTE (Venous Thromboembolism) Risk  Recent Flowsheet Documentation  Taken 7/19/2025 0850 by Judi Galeana RN  VTE Prevention/Management: (lovenox) other (see comments)  Intervention: Prevent Infection  Recent Flowsheet Documentation  Taken 7/19/2025 1405 by Judi Galeana RN  Infection Prevention:   environmental surveillance performed   equipment surfaces disinfected   hand hygiene promoted   personal protective equipment  utilized   rest/sleep promoted   single patient room provided  Taken 7/19/2025 1220 by Judi Galeana RN  Infection Prevention:   environmental surveillance performed   equipment surfaces disinfected   hand hygiene promoted   personal protective equipment utilized   rest/sleep promoted   single patient room provided  Taken 7/19/2025 1005 by Judi Galeana RN  Infection Prevention:   environmental surveillance performed   equipment surfaces disinfected   hand hygiene promoted   personal protective equipment utilized   rest/sleep promoted   single patient room provided  Taken 7/19/2025 0850 by Judi aGleana RN  Infection Prevention:   environmental surveillance performed   equipment surfaces disinfected   hand hygiene promoted   personal protective equipment utilized   rest/sleep promoted   single patient room provided  Goal: Optimal Comfort and Wellbeing  7/19/2025 1537 by Judi Galeana RN  Outcome: Adequate for Care Transition  7/19/2025 1536 by Judi Galeana RN  Outcome: Progressing  Intervention: Provide Person-Centered Care  Recent Flowsheet Documentation  Taken 7/19/2025 0850 by Judi Galeana RN  Trust Relationship/Rapport:   care explained   choices provided   empathic listening provided   emotional support provided   questions answered   questions encouraged   reassurance provided   thoughts/feelings acknowledged  Goal: Readiness for Transition of Care  7/19/2025 1537 by Judi Galeana RN  Outcome: Adequate for Care Transition  7/19/2025 1536 by Judi Galeana RN  Outcome: Progressing     Problem: Skin Injury Risk Increased  Goal: Skin Health and Integrity  7/19/2025 1537 by Judi Galeana RN  Outcome: Adequate for Care Transition  7/19/2025 1536 by Judi Galeana RN  Outcome: Progressing  Intervention: Optimize Skin Protection  Recent Flowsheet Documentation  Taken 7/19/2025 1405 by Judi Galeana RN  Pressure Reduction Techniques: frequent weight shift encouraged  Head of Bed (HOB)  Positioning: [facility] elevated  Pressure Reduction Devices:   pressure-redistributing mattress utilized   positioning supports utilized  Skin Protection:   incontinence pads utilized   silicone foam dressing in place   transparent dressing maintained  Taken 7/19/2025 1220 by Judi Galeana RN  Pressure Reduction Techniques: frequent weight shift encouraged  Head of Bed (HOB) Positioning: [facility] elevated  Pressure Reduction Devices:   pressure-redistributing mattress utilized   positioning supports utilized  Skin Protection:   incontinence pads utilized   silicone foam dressing in place   transparent dressing maintained  Taken 7/19/2025 1005 by Judi Galeana RN  Pressure Reduction Techniques: frequent weight shift encouraged  Head of Bed (HOB) Positioning: [facility] elevated  Pressure Reduction Devices:   pressure-redistributing mattress utilized   positioning supports utilized  Skin Protection:   incontinence pads utilized   silicone foam dressing in place   transparent dressing maintained  Taken 7/19/2025 0850 by Judi Galeana RN  Pressure Reduction Techniques: frequent weight shift encouraged  Head of Bed (HOB) Positioning: [facility] elevated  Pressure Reduction Devices:   pressure-redistributing mattress utilized   positioning supports utilized  Skin Protection:   incontinence pads utilized   silicone foam dressing in place   transparent dressing maintained     Problem: Fall Injury Risk  Goal: Absence of Fall and Fall-Related Injury  7/19/2025 1537 by Judi Galeana RN  Outcome: Adequate for Care Transition  7/19/2025 1536 by Judi Galeana RN  Outcome: Progressing  Intervention: Identify and Manage Contributors  Recent Flowsheet Documentation  Taken 7/19/2025 1405 by Judi Galeana RN  Medication Review/Management: medications reviewed  Taken 7/19/2025 1220 by Judi Galeana RN  Medication Review/Management: medications reviewed  Taken 7/19/2025 1005 by Judi Galeana RN  Medication Review/Management: medications  reviewed  Taken 7/19/2025 0850 by Judi Galeana RN  Medication Review/Management: medications reviewed  Intervention: Promote Injury-Free Environment  Recent Flowsheet Documentation  Taken 7/19/2025 1405 by Judi Galeana, RN  Safety Promotion/Fall Prevention:   activity supervised   assistive device/personal items within reach   clutter free environment maintained   elopement precautions   fall prevention program maintained   gait belt   lighting adjusted   mobility aid in reach   muscle strengthening facilitated   nonskid shoes/slippers when out of bed   room organization consistent   safety round/check completed   toileting scheduled  Taken 7/19/2025 1220 by Judi Galeana, RN  Safety Promotion/Fall Prevention:   activity supervised   assistive device/personal items within reach   clutter free environment maintained   elopement precautions   fall prevention program maintained   gait belt   lighting adjusted   mobility aid in reach   muscle strengthening facilitated   nonskid shoes/slippers when out of bed   room organization consistent   safety round/check completed   toileting scheduled  Taken 7/19/2025 1005 by Judi Galenaa, RN  Safety Promotion/Fall Prevention:   activity supervised   assistive device/personal items within reach   clutter free environment maintained   elopement precautions   fall prevention program maintained   gait belt   lighting adjusted   mobility aid in reach   muscle strengthening facilitated   nonskid shoes/slippers when out of bed   room organization consistent   safety round/check completed   toileting scheduled  Taken 7/19/2025 0850 by Judi Galeana, RN  Safety Promotion/Fall Prevention:   activity supervised   assistive device/personal items within reach   clutter free environment maintained   elopement precautions   fall prevention program maintained   gait belt   lighting adjusted   mobility aid in reach   muscle strengthening facilitated   nonskid shoes/slippers when out of  bed   room organization consistent   safety round/check completed   toileting scheduled     Problem: Hip Arthroplasty  Goal: Optimal Coping  7/19/2025 1537 by Judi Galeana RN  Outcome: Adequate for Care Transition  7/19/2025 1536 by Judi Galeana RN  Outcome: Progressing  Goal: Absence of Bleeding  7/19/2025 1537 by Judi Galeana RN  Outcome: Adequate for Care Transition  7/19/2025 1536 by Judi Galeana RN  Outcome: Progressing  Goal: Effective Bowel Elimination  7/19/2025 1537 by Judi Galeana RN  Outcome: Adequate for Care Transition  7/19/2025 1536 by Judi Galeana RN  Outcome: Progressing  Goal: Fluid and Electrolyte Balance  7/19/2025 1537 by Judi Galeana RN  Outcome: Adequate for Care Transition  7/19/2025 1536 by Judi Galeana RN  Outcome: Progressing  Goal: Optimal Functional Ability  7/19/2025 1537 by Judi Galeana RN  Outcome: Adequate for Care Transition  7/19/2025 1536 by Judi Galeana RN  Outcome: Progressing  Goal: Absence of Infection Signs and Symptoms  7/19/2025 1537 by Judi Galeana RN  Outcome: Adequate for Care Transition  7/19/2025 1536 by Judi Galeana RN  Outcome: Progressing  Goal: Intact Neurovascular Status  7/19/2025 1537 by Judi Galeana RN  Outcome: Adequate for Care Transition  7/19/2025 1536 by Judi Galeana RN  Outcome: Progressing  Goal: Anesthesia/Sedation Recovery  7/19/2025 1537 by Judi Galeana RN  Outcome: Adequate for Care Transition  7/19/2025 1536 by Judi Galeana RN  Outcome: Progressing  Intervention: Optimize Anesthesia Recovery  Recent Flowsheet Documentation  Taken 7/19/2025 1405 by Judi Galeana RN  Safety Promotion/Fall Prevention:   activity supervised   assistive device/personal items within reach   clutter free environment maintained   elopement precautions   fall prevention program maintained   gait belt   lighting adjusted   mobility aid in reach   muscle strengthening facilitated   nonskid shoes/slippers when out of bed   room  organization consistent   safety round/check completed   toileting scheduled  Taken 7/19/2025 1318 by Judi Galeana RN  Administration (IS): self-administered  Taken 7/19/2025 1220 by Judi Galeana RN  Safety Promotion/Fall Prevention:   activity supervised   assistive device/personal items within reach   clutter free environment maintained   elopement precautions   fall prevention program maintained   gait belt   lighting adjusted   mobility aid in reach   muscle strengthening facilitated   nonskid shoes/slippers when out of bed   room organization consistent   safety round/check completed   toileting scheduled  Taken 7/19/2025 1005 by Judi Galeana RN  Safety Promotion/Fall Prevention:   activity supervised   assistive device/personal items within reach   clutter free environment maintained   elopement precautions   fall prevention program maintained   gait belt   lighting adjusted   mobility aid in reach   muscle strengthening facilitated   nonskid shoes/slippers when out of bed   room organization consistent   safety round/check completed   toileting scheduled  Taken 7/19/2025 0922 by Judi Galeana RN  Administration (IS): self-administered  Taken 7/19/2025 0850 by Judi Galeana RN  Patient Tolerance (IS):   fair   no adverse signs/symptoms present  Safety Promotion/Fall Prevention:   activity supervised   assistive device/personal items within reach   clutter free environment maintained   elopement precautions   fall prevention program maintained   gait belt   lighting adjusted   mobility aid in reach   muscle strengthening facilitated   nonskid shoes/slippers when out of bed   room organization consistent   safety round/check completed   toileting scheduled  Taken 7/19/2025 0747 by Judi Galeana RN  Administration (IS): self-administered  Goal: Optimal Pain Control and Function  7/19/2025 1537 by Judi Galeana RN  Outcome: Adequate for Care Transition  7/19/2025 1536 by Judi Galeana,  RN  Outcome: Progressing  Intervention: Prevent or Manage Pain  Recent Flowsheet Documentation  Taken 7/19/2025 0850 by Judi Galeana RN  Diversional Activities: television  Goal: Nausea and Vomiting Relief  7/19/2025 1537 by Judi Galeana RN  Outcome: Adequate for Care Transition  7/19/2025 1536 by Judi Galeana RN  Outcome: Progressing  Goal: Effective Urinary Elimination  7/19/2025 1537 by Judi Galeana RN  Outcome: Adequate for Care Transition  7/19/2025 1536 by Judi Galeana RN  Outcome: Progressing  Goal: Effective Oxygenation and Ventilation  7/19/2025 1537 by Judi Galeana RN  Outcome: Adequate for Care Transition  7/19/2025 1536 by Judi Galeana RN  Outcome: Progressing  Intervention: Optimize Oxygenation and Ventilation  Recent Flowsheet Documentation  Taken 7/19/2025 1405 by Judi Galeana RN  Head of Bed (HOB) Positioning: HOB elevated  Taken 7/19/2025 1220 by Judi Galeana RN  Head of Bed (HOB) Positioning: HOB elevated  Taken 7/19/2025 1005 by Judi Galeana RN  Head of Bed (HOB) Positioning: HOB elevated  Taken 7/19/2025 0850 by Judi Galeana RN  Head of Bed (HOB) Positioning: HOB elevated   Goal Outcome Evaluation:

## 2025-07-19 NOTE — DISCHARGE PLACEMENT REQUEST
"Chula Chen (89 y.o. Female)       Date of Birth   1931    Social Security Number       Address   [address] [place] [address]    Home Phone   [phone]    MRN   [id number]       Restoration   None    Marital Status                               Admission Date   7/15/2025    Admission Type   Emergency    Admitting Provider   Willie Acuna MD    Attending Provider   Willie Acuna MD    Department, Room/Bed   [facility] 3G, S351/1       Discharge Date       Discharge Disposition   Rehab Facility or Unit (DC - External)    Discharge Destination                                 Attending Provider: Willie Acuna MD    Allergies: No Known Allergies    Isolation: None   Infection: None   Code Status: CPR    Ht: 149.9 cm (59.02\")   Wt: 48.5 kg (106 lb 14.8 oz)    Admission Cmt: None   Principal Problem: Hip fracture [S72.009A]                   Active Insurance as of 7/15/2025       Primary Coverage       Payor Plan Insurance Group Employer/Plan Group    [employer] MEDICARE REPLACEMENT [employer] Medicare Advantage GROUP PPO [id number]       Payor Plan Address Payor Plan Phone Number Payor Plan Fax Number Effective Dates    [address] [address]   2025 - None Entered    [facility] [id number]         Subscriber Name Subscriber Birth Date Member ID       CHULA CHEN 1931 [id number]                     Emergency Contacts        (Rel.) Home Phone Work Phone Mobile Phone    ZAIDA CHOUDHARY (Daughter) [phone] -- [phone]                   Discharge Summary        Willie Acuna MD at 25 0858              [facility]  DISCHARGE SUMMARY    Patient Name: Chula Chen  : 1931  MRN: [id number]    Date of Admission: 7/15/2025 12:58 AM  Date of Discharge:  25  Primary Care Physician: Provider, No Known    Consults       No orders found from 2025 to 2025.      "       Hospital Course     Presenting Problem: Fall and right hip fracture    Active Hospital Problems    Diagnosis  POA    **Hip fracture [S72.009A]  Yes      Resolved Hospital Problems   No resolved problems to display.          Hospital Course:  Chula Chen is a 89 y.o. female   with past medical history significant for hypertension, essential tremors, history of COVID, history of right large hiatal hernia, an assisted living facility resident.  Evidently patient was in her usual state of health until  morning of 7/15/25  when she woke up and tried to go to the bathroom using her walker.  In the bathroom evidently she lost the control of the walker and had a fall.      Acute hypoxemia with x-ray showing large hiatal hernia with right hemidiaphragm elevated pushing into mediastinum.  -Chest x-ray also shows some consolidation on the right lower lobe  - Has been on Rocephin and doxycycline.  -Currently patient is on room air and saturating in low 90s.  If patient develops hypoxemia or dyspnea she should use oxygen.  This hiatal hernia and right hemidiaphragm elevation is a significant contributor to the symptoms.  However, patient is not going to tolerate or agree with surgical procedure.     Status post fall and right hip fracture  - X-ray of the right hip shows subcapital right femoral neck fracture  - S/p surgery by Dr. Arellano with no immediate complications.  -Patient is to be anticoagulated with 81 mg aspirin twice daily for a month     Low blood pressure  -We gave 500 cc bolus of normal saline.  Will also start the patient on midodrine to prevent very low pressure.    - Currently blood pressure is much better controlled.  Will discontinue midodrine for discharge     Difficulty with urination  - UA shows large leukocyte Estrace  - Urine culture growing E. Coli, patient treated with Rocephin     Depression  - Patient is on Celexa and also clonazepam at home.  Will continue the home medication and  monitor     Essential tremors  - Patient is on primidone at home which we will continue.      Discharge Follow Up Recommendations for outpatient labs/diagnostics:       Day of Discharge     HPI:   Resting in bed no acute distress and feels okay except she is a little more depressed this morning.  No fever or chills.  No chest pain or palpitation.  No nausea or vomiting.  Patient's daughter is also present at the bedside and I explained the plan of care to her.    Review of Systems  As above    Vital Signs:   Temp:  [97.6 °F (36.4 °C)-98.6 °F (37 °C)] 97.8 °F (36.6 °C)  Heart Rate:  [81-99] 87  Resp:  [16] 16  BP: (134-164)/(65-93) 159/85  Flow (L/min) (Oxygen Therapy):  [1-3] 1      Physical Exam:  Constitutional: No acute distress, looks comfortable  HENT: NCAT, mucous membranes moist  Respiratory: Clear to auscultation bilaterally, respiratory effort normal, currently on room air.  Cardiovascular: RRR, no murmurs, rubs, or gallops  Gastrointestinal: Positive bowel sounds, soft, nontender, nondistended  Musculoskeletal: No bilateral ankle edema  Psychiatric: Flat affect, cooperative  Neurologic: Awake, alert, follows commands,  speech clear  Skin: No rashes     Pertinent  and/or Most Recent Results     LAB RESULTS:      Lab 07/19/25  0700 07/17/25  0615 07/16/25  0836 07/15/25  0248   WBC 10.90* 14.15* 11.99* 11.97*   HEMOGLOBIN 10.0* 9.5* 10.2* 13.3   HEMATOCRIT 31.1* 28.9* 31.1* 40.2   PLATELETS 384 308 319 432   NEUTROS ABS 6.76 9.09* 9.31* 8.06*   IMMATURE GRANS (ABS) 0.06* 0.07* 0.04 0.05   LYMPHS ABS 2.70 3.07 1.63 2.69   MONOS ABS 1.17* 1.69* 0.86 0.93*   EOS ABS 0.17 0.20 0.12 0.21   MCV 84.7 85.8 83.8 83.4         Lab 07/19/25  0700 07/16/25  0836 07/15/25  1105 07/15/25  0248   SODIUM 135* 138  --  134*   POTASSIUM 2.9* 3.7  --  3.8   CHLORIDE 100 105  --  100   CO2 25.0 22.1  --  22.0   ANION GAP 10.0 10.9  --  12.0   BUN 11.2 9.9  --  18.5   CREATININE 0.31* 0.53*  --  0.49*   EGFR 97.7 85.8  --  87.5    GLUCOSE 121* 170*  --  125*   CALCIUM 7.9* 8.0*  --  9.2   MAGNESIUM  --   --  2.0  --    PHOSPHORUS  --   --  2.5  --          Lab 07/15/25  0248   TOTAL PROTEIN 6.9   ALBUMIN 3.3*   GLOBULIN 3.6   ALT (SGPT) 87*   AST (SGOT) 36*   BILIRUBIN 0.2   ALK PHOS 152*                     Brief Urine Lab Results  (Last result in the past 365 days)        Color   Clarity   Blood   Leuk Est   Nitrite   Protein   CREAT   Urine HCG        07/15/25 0958 Yellow   Turbid   Moderate (2+)   Large (3+)   Negative   30 mg/dL (1+)                 Microbiology Results (last 10 days)       Procedure Component Value - Date/Time    Urine Culture - Urine, Straight Cath [[id number]]  (Abnormal)  (Susceptibility) Collected: 07/15/25 0958    Lab Status: Final result Specimen: Urine from Straight Cath Updated: 07/17/25 1001     Urine Culture >100,000 CFU/mL Escherichia coli    Narrative:      Colonization of the urinary tract without infection is common. Treatment is discouraged unless the patient is symptomatic, pregnant, or undergoing an invasive urologic procedure.    Susceptibility        Escherichia coli      JAYJAY      Amoxicillin + Clavulanate Susceptible      Ampicillin Susceptible      Ampicillin + Sulbactam Susceptible      Cefazolin (Urine) Susceptible      Cefepime Susceptible      Ceftazidime Susceptible      Ceftriaxone Susceptible      Cefuroxime axetil Susceptible      Ciprofloxacin Resistant      Gentamicin Susceptible      Levofloxacin Resistant      Nitrofurantoin Susceptible      Piperacillin + Tazobactam Susceptible      Trimethoprim + Sulfamethoxazole Susceptible                                   XR Chest 1 View  Result Date: 7/17/2025  XR CHEST 1 VW Date of Exam: 7/17/2025 2:16 PM EDT Indication: SOB Comparison: 7/6/2023 Findings: Cardiac size is stable. Vascular markings are normal. There is marked elevation of the right hemidiaphragm with a large right-sided diaphragmatic hernia. There is increased consolidation in the  right lung base compared with the prior study. The left lung  appears clear.     Impression: Marked elevation of the right hemidiaphragm with a large right-sided diaphragmatic hernia. There is increased consolidation in the right lung base compared with the prior study. Electronically Signed: Gerry Milian MD  7/17/2025 2:52 PM EDT  Workstation ID: [id number]    XR Hip With or Without Pelvis 2 - 3 View Right  Result Date: 7/15/2025  XR HIP W OR WO PELVIS 2-3 VIEW RIGHT Date of Exam: 7/15/2025 6:50 PM EDT Indication: Post-Op Hip Arthroplasty Comparison: Hip and pelvis radiographs 7/15/2025 Findings: Interval right hip arthroplasty without radiographic evidence of immediate complication. Surrounding soft tissue edema and subcutaneous emphysema, favored postoperative. Degenerative changes of the sacroiliac joints, left hip, and pubic symphysis appear unchanged.     Impression: Interval right hip arthroplasty without radiographic evidence of immediate complication. Electronically Signed: Andrez Martinez MD  7/15/2025 7:12 PM EDT  Workstation ID: [id number]    RIGHt FI cath  Result Date: 7/15/2025  Oren Wiggins CRNA     7/15/2025  1:22 PM RIGHt FI cath Patient reassessed immediately prior to procedure Reason for block: at surgeon's request and post-op pain management Performed by CRNA/CAA: Oren Wiggins CRNA Assisted by: Genevieve Floyd RN Preanesthetic Checklist Completed: patient identified, IV checked, site marked, risks and benefits discussed, surgical consent, monitors and equipment checked, pre-op evaluation and timeout performed Prep: Pt Position: supine Sterile barriers:cap, gloves, mask and washed/disinfected hands Prep: ChloraPrep Patient monitoring: blood pressure monitoring, continuous pulse oximetry and EKG Procedure Sedation: no Performed under: local infiltration Guidance:ultrasound guided ULTRASOUND INTERPRETATION.  Using ultrasound guidance a 20 G gauge needle was placed in close proximity to the nerve,  at which point, under ultrasound guidance anesthetic was injected in the area of the nerve and spread of the anesthesia was seen on ultrasound in close proximity thereto.  There were no abnormalities seen on ultrasound; a digital image was taken; and the patient tolerated the procedure with no complications. Images:still images obtained, printed/placed on chart Laterality:right Block Type:fascia iliaca compartment Injection Technique:catheter Needle Type:echogenic and Tuohy Needle Gauge:18 G Resistance on Injection: none Catheter Size:20 G (20g) Cath Depth at skin: 11 cm Post Assessment Injection Assessment: negative aspiration for heme, no paresthesia on injection and incremental injection Patient Tolerance:comfortable throughout block Complications:no Additional Notes CKAFASCIAILIACA: CATHETER A high-frequency linear transducer, with sterile cover, was placed in parasagittal plane on top of the Anterior Superior Iliac Spine (ASIS) and moved medially to identify the Internal Oblique muscle, Sartorius muscle, Iliacus Muscle, Fascia Iliaca (FI) and Fascia Latae. The insertion site was prepped and draped in sterile fashion. Skin and cutaneous tissue was infiltrated with 2-5 ml of 1% Lidocaine. Using ultrasound-guidance, an 18-gauge Contiplex Ultra 360 Touhy needle was advanced in plane from caudad to cephalad. Preservative-free normal saline was utilized for hydro-dissection of tissue, advancement of Touhy, and to confirm final needle placement below FI. Local anesthetic in incremental 3-5 ml injections. Aspiration every 5 ml to prevent intravascular injection. Injection was completed with negative aspiration of blood and negative intravascular injection. Injection pressures were normal with minimal resistance. A 20-gauge Contiplex Echo catheter was placed through the needle and advance out the tip of the Touhy 3-5 cm. The Touhy needle was then removed, and final catheter position verified below the FI. The catheter  "was secured in the usual fashion with skin glue, benzoin, steri-strips, CHG tegaderm and Label noting \"Nerve Block Catheter\". Jerk tape applied at yellow connector and catheter connection. Performed by: Oren Wiggins CRNA     CT Head Without Contrast  Result Date: 7/15/2025  CT HEAD WO CONTRAST Date of Exam: 7/15/2025 1:55 AM EDT Indication: head injury. Comparison: None available. Technique: Axial CT images were obtained of the head without contrast administration.  Automated exposure control and iterative construction methods were used. Findings: There is mild diffuse generalized atrophy. There are low-attenuation areas in the periventricular white matter consistent with chronic microvascular ischemic change. There is no mass, mass effect or midline shift. There are no abnormal extra-axial fluid collections or areas of acute hemorrhage. There is mild left maxillary sinus disease. The mastoid air cells are clear. There is a skin laceration in the right lateral frontal region with no radiopaque foreign body or underlying bony abnormality.     Impression: Atrophy and chronic microvascular ischemic change. No acute intracranial process. Electronically Signed: Dav Reed MD  7/15/2025 2:41 AM EDT  Workstation ID: [id number]    XR Hip With or Without Pelvis 2 - 3 View Right  Result Date: 7/15/2025  XR HIP W OR WO PELVIS 2-3 VIEW RIGHT Date of Exam: 7/15/2025 1:40 AM EDT Indication: hip injury Comparison: None available. Findings: There is an abnormal appearance at the subcapital portion of the right femur suspected to represent a fracture. There is bilateral hip arthritic change. There are no lytic or destructive bony lesions.     Impression: Suspected subcapital right femoral neck fracture. Electronically Signed: Dav Reed MD  7/15/2025 2:23 AM EDT  Workstation ID: [id number]              Results for orders placed during the hospital encounter of 07/15/25    Adult Transthoracic Echo Complete W/ Cont if Necessary Per " Protocol 07/17/2025  4:55 PM    Interpretation Summary    Left ventricular systolic function is normal. Left ventricular ejection fraction appears to be 61 - 65%.    Left ventricular wall thickness is consistent with mild concentric hypertrophy.    There is moderate calcification of the aortic valve.    Moderate mitral annular calcification is present.      Plan for Follow-up of Pending Labs/Results:     Discharge Details        Discharge Medications        Continue These Medications        Instructions Start Date   acetaminophen 325 MG tablet  Commonly known as: TYLENOL   325 mg, Oral, Every 6 Hours PRN      benzonatate 100 MG capsule  Commonly known as: TESSALON   100 mg, Oral, 3 Times Daily PRN      bimatoprost 0.01 % ophthalmic drops  Commonly known as: LUMIGAN   1 drop, Both Eyes, Nightly      calcium carbonate 500 MG chewable tablet  Commonly known as: TUMS   1 tablet, Oral, 3 Times Daily PRN, OTC      cholecalciferol 25 MCG (1000 UT) tablet  Commonly known as: VITAMIN D3   1,000 Units, Oral, Daily, OTC      citalopram 40 MG tablet  Commonly known as: CeleXA   20 mg, Oral, Daily      clonazePAM 0.5 MG tablet  Commonly known as: KlonoPIN   0.5 mg, Oral, Nightly PRN      docusate sodium 100 MG capsule  Commonly known as: COLACE   100 mg, Oral, 2 Times Daily, OTC      dorzolamide-timolol 2-0.5 % ophthalmic solution  Commonly known as: COSOPT   1 drop, Both Eyes, 2 Times Daily      fluticasone 50 MCG/ACT nasal spray  Commonly known as: FLONASE   2 sprays, Nasal, Daily, OTC      guaiFENesin 600 MG 12 hr tablet  Commonly known as: MUCINEX   600 mg, Oral, 2 Times Daily, OTC      Loratadine 10 MG capsule   10 mg, Oral, Daily, OTC      Multivitamin Adults tablet tablet  Generic drug: multivitamin with minerals   1 tablet, Oral, Daily, OTC      olopatadine 0.2 % solution ophthalmic solution  Commonly known as: PATADAY   1 drop, Right Eye, Daily      ondansetron ODT 4 MG disintegrating tablet  Commonly known as:  ZOFRAN-ODT   4 mg, Translingual, 4 Times Daily PRN      primidone 50 MG tablet  Commonly known as: MYSOLINE   50 mg, Oral, 3 Times Daily      traZODone 50 MG tablet  Commonly known as: DESYREL   50 mg, Oral, Nightly               No Known Allergies      Discharge Disposition:      Diet:  Hospital:  Diet Order   Procedures    Diet: Regular/House; Texture: Soft to Chew (NDD 3); Soft to Chew: Chopped Meat; Fluid Consistency: Thin (IDDSI 0)            Activity:  As per instruction of orthopedic surgery    Restrictions or Other Recommendations:         CODE STATUS:    Code Status and Medical Interventions: CPR (Attempt to Resuscitate); Full Support   Ordered at: 07/15/25 1948     Code Status (Patient has no pulse and is not breathing):    CPR (Attempt to Resuscitate)     Medical Interventions (Patient has pulse or is breathing):    Full Support     Level Of Support Discussed With:    Patient       No future appointments.              Willie Acuna MD  07/19/25      Time Spent on Discharge:  I spent  40  minutes on this discharge activity which included: face-to-face encounter with the patient, reviewing the data in the system, coordination of the care with the nursing staff as well as consultants, documentation, and entering orders.           Electronically signed by Willie Acuna MD at 07/19/25 6971

## 2025-07-19 NOTE — PLAN OF CARE
Problem: Adult Inpatient Plan of Care  Goal: Plan of Care Review  Outcome: Progressing  Goal: Patient-Specific Goal (Individualized)  Outcome: Progressing  Goal: Absence of Hospital-Acquired Illness or Injury  Outcome: Progressing  Intervention: Identify and Manage Fall Risk  Recent Flowsheet Documentation  Taken 7/19/2025 1405 by Judi Galeana RN  Safety Promotion/Fall Prevention:   activity supervised   assistive device/personal items within reach   clutter free environment maintained   elopement precautions   fall prevention program maintained   gait belt   lighting adjusted   mobility aid in reach   muscle strengthening facilitated   nonskid shoes/slippers when out of bed   room organization consistent   safety round/check completed   toileting scheduled  Taken 7/19/2025 1220 by Judi Galeana RN  Safety Promotion/Fall Prevention:   activity supervised   assistive device/personal items within reach   clutter free environment maintained   elopement precautions   fall prevention program maintained   gait belt   lighting adjusted   mobility aid in reach   muscle strengthening facilitated   nonskid shoes/slippers when out of bed   room organization consistent   safety round/check completed   toileting scheduled  Taken 7/19/2025 1005 by Judi Galeana RN  Safety Promotion/Fall Prevention:   activity supervised   assistive device/personal items within reach   clutter free environment maintained   elopement precautions   fall prevention program maintained   gait belt   lighting adjusted   mobility aid in reach   muscle strengthening facilitated   nonskid shoes/slippers when out of bed   room organization consistent   safety round/check completed   toileting scheduled  Taken 7/19/2025 0850 by Judi Galeana RN  Safety Promotion/Fall Prevention:   activity supervised   assistive device/personal items within reach   clutter free environment maintained   elopement precautions   fall prevention program maintained    gait belt   lighting adjusted   mobility aid in reach   muscle strengthening facilitated   nonskid shoes/slippers when out of bed   room organization consistent   safety round/check completed   toileting scheduled  Intervention: Prevent Skin Injury  Recent Flowsheet Documentation  Taken 7/19/2025 1500 by Judi Galeana RN  Body Position:   right   turned  Taken 7/19/2025 1405 by Judi Galeana RN  Body Position: position maintained  Skin Protection:   incontinence pads utilized   silicone foam dressing in place   transparent dressing maintained  Taken 7/19/2025 1220 by Judi Galeana RN  Body Position: position maintained  Skin Protection:   incontinence pads utilized   silicone foam dressing in place   transparent dressing maintained  Taken 7/19/2025 1005 by Judi Galeana RN  Body Position: position maintained  Skin Protection:   incontinence pads utilized   silicone foam dressing in place   transparent dressing maintained  Taken 7/19/2025 0850 by Judi Galeana RN  Body Position: position maintained  Skin Protection:   incontinence pads utilized   silicone foam dressing in place   transparent dressing maintained  Intervention: Prevent and Manage VTE (Venous Thromboembolism) Risk  Recent Flowsheet Documentation  Taken 7/19/2025 0850 by Judi Glaeana RN  VTE Prevention/Management: (lovenox) other (see comments)  Intervention: Prevent Infection  Recent Flowsheet Documentation  Taken 7/19/2025 1405 by Judi Galeana RN  Infection Prevention:   environmental surveillance performed   equipment surfaces disinfected   hand hygiene promoted   personal protective equipment utilized   rest/sleep promoted   single patient room provided  Taken 7/19/2025 1220 by Judi Galeana RN  Infection Prevention:   environmental surveillance performed   equipment surfaces disinfected   hand hygiene promoted   personal protective equipment utilized   rest/sleep promoted   single patient room provided  Taken 7/19/2025 1005 by  Judi Galeana RN  Infection Prevention:   environmental surveillance performed   equipment surfaces disinfected   hand hygiene promoted   personal protective equipment utilized   rest/sleep promoted   single patient room provided  Taken 7/19/2025 0850 by Judi Galeana RN  Infection Prevention:   environmental surveillance performed   equipment surfaces disinfected   hand hygiene promoted   personal protective equipment utilized   rest/sleep promoted   single patient room provided  Goal: Optimal Comfort and Wellbeing  Outcome: Progressing  Intervention: Provide Person-Centered Care  Recent Flowsheet Documentation  Taken 7/19/2025 0850 by Judi Galeana RN  Trust Relationship/Rapport:   care explained   choices provided   empathic listening provided   emotional support provided   questions answered   questions encouraged   reassurance provided   thoughts/feelings acknowledged  Goal: Readiness for Transition of Care  Outcome: Progressing     Problem: Skin Injury Risk Increased  Goal: Skin Health and Integrity  Outcome: Progressing  Intervention: Optimize Skin Protection  Recent Flowsheet Documentation  Taken 7/19/2025 1405 by Judi Galeana RN  Pressure Reduction Techniques: frequent weight shift encouraged  Head of Bed (HOB) Positioning: HOB elevated  Pressure Reduction Devices:   pressure-redistributing mattress utilized   positioning supports utilized  Skin Protection:   incontinence pads utilized   silicone foam dressing in place   transparent dressing maintained  Taken 7/19/2025 1220 by Judi Galeana RN  Pressure Reduction Techniques: frequent weight shift encouraged  Head of Bed (HOB) Positioning: HOB elevated  Pressure Reduction Devices:   pressure-redistributing mattress utilized   positioning supports utilized  Skin Protection:   incontinence pads utilized   silicone foam dressing in place   transparent dressing maintained  Taken 7/19/2025 1005 by Judi Galeana RN  Pressure Reduction Techniques:  frequent weight shift encouraged  Head of Bed (HOB) Positioning: HOB elevated  Pressure Reduction Devices:   pressure-redistributing mattress utilized   positioning supports utilized  Skin Protection:   incontinence pads utilized   silicone foam dressing in place   transparent dressing maintained  Taken 7/19/2025 0850 by Judi Galeana RN  Pressure Reduction Techniques: frequent weight shift encouraged  Head of Bed (HOB) Positioning: HOB elevated  Pressure Reduction Devices:   pressure-redistributing mattress utilized   positioning supports utilized  Skin Protection:   incontinence pads utilized   silicone foam dressing in place   transparent dressing maintained     Problem: Fall Injury Risk  Goal: Absence of Fall and Fall-Related Injury  Outcome: Progressing  Intervention: Identify and Manage Contributors  Recent Flowsheet Documentation  Taken 7/19/2025 1405 by Judi Galeana RN  Medication Review/Management: medications reviewed  Taken 7/19/2025 1220 by Judi Galeana RN  Medication Review/Management: medications reviewed  Taken 7/19/2025 1005 by Judi Galeana RN  Medication Review/Management: medications reviewed  Taken 7/19/2025 0850 by Judi Galeana RN  Medication Review/Management: medications reviewed  Intervention: Promote Injury-Free Environment  Recent Flowsheet Documentation  Taken 7/19/2025 1405 by Judi Galeana RN  Safety Promotion/Fall Prevention:   activity supervised   assistive device/personal items within reach   clutter free environment maintained   elopement precautions   fall prevention program maintained   gait belt   lighting adjusted   mobility aid in reach   muscle strengthening facilitated   nonskid shoes/slippers when out of bed   room organization consistent   safety round/check completed   toileting scheduled  Taken 7/19/2025 1220 by Judi Galeana RN  Safety Promotion/Fall Prevention:   activity supervised   assistive device/personal items within reach   clutter free  environment maintained   elopement precautions   fall prevention program maintained   gait belt   lighting adjusted   mobility aid in reach   muscle strengthening facilitated   nonskid shoes/slippers when out of bed   room organization consistent   safety round/check completed   toileting scheduled  Taken 7/19/2025 1005 by Judi Galeana RN  Safety Promotion/Fall Prevention:   activity supervised   assistive device/personal items within reach   clutter free environment maintained   elopement precautions   fall prevention program maintained   gait belt   lighting adjusted   mobility aid in reach   muscle strengthening facilitated   nonskid shoes/slippers when out of bed   room organization consistent   safety round/check completed   toileting scheduled  Taken 7/19/2025 0850 by Judi Galeana RN  Safety Promotion/Fall Prevention:   activity supervised   assistive device/personal items within reach   clutter free environment maintained   elopement precautions   fall prevention program maintained   gait belt   lighting adjusted   mobility aid in reach   muscle strengthening facilitated   nonskid shoes/slippers when out of bed   room organization consistent   safety round/check completed   toileting scheduled     Problem: Hip Arthroplasty  Goal: Optimal Coping  Outcome: Progressing  Goal: Absence of Bleeding  Outcome: Progressing  Goal: Effective Bowel Elimination  Outcome: Progressing  Goal: Fluid and Electrolyte Balance  Outcome: Progressing  Goal: Optimal Functional Ability  Outcome: Progressing  Goal: Absence of Infection Signs and Symptoms  Outcome: Progressing  Goal: Intact Neurovascular Status  Outcome: Progressing  Goal: Anesthesia/Sedation Recovery  Outcome: Progressing  Intervention: Optimize Anesthesia Recovery  Recent Flowsheet Documentation  Taken 7/19/2025 1405 by Judi Galeana RN  Safety Promotion/Fall Prevention:   activity supervised   assistive device/personal items within reach   clutter free  environment maintained   elopement precautions   fall prevention program maintained   gait belt   lighting adjusted   mobility aid in reach   muscle strengthening facilitated   nonskid shoes/slippers when out of bed   room organization consistent   safety round/check completed   toileting scheduled  Taken 7/19/2025 1318 by Judi Galeana RN  Administration (IS): self-administered  Taken 7/19/2025 1220 by Judi Galeana RN  Safety Promotion/Fall Prevention:   activity supervised   assistive device/personal items within reach   clutter free environment maintained   elopement precautions   fall prevention program maintained   gait belt   lighting adjusted   mobility aid in reach   muscle strengthening facilitated   nonskid shoes/slippers when out of bed   room organization consistent   safety round/check completed   toileting scheduled  Taken 7/19/2025 1005 by Judi Galeana RN  Safety Promotion/Fall Prevention:   activity supervised   assistive device/personal items within reach   clutter free environment maintained   elopement precautions   fall prevention program maintained   gait belt   lighting adjusted   mobility aid in reach   muscle strengthening facilitated   nonskid shoes/slippers when out of bed   room organization consistent   safety round/check completed   toileting scheduled  Taken 7/19/2025 0922 by Judi Galeana RN  Administration (IS): self-administered  Taken 7/19/2025 0850 by Judi Galeana RN  Patient Tolerance (IS):   fair   no adverse signs/symptoms present  Safety Promotion/Fall Prevention:   activity supervised   assistive device/personal items within reach   clutter free environment maintained   elopement precautions   fall prevention program maintained   gait belt   lighting adjusted   mobility aid in reach   muscle strengthening facilitated   nonskid shoes/slippers when out of bed   room organization consistent   safety round/check completed   toileting scheduled  Taken 7/19/2025 0747 by  Judi Galeana RN  Administration (IS): self-administered  Goal: Optimal Pain Control and Function  Outcome: Progressing  Intervention: Prevent or Manage Pain  Recent Flowsheet Documentation  Taken 7/19/2025 0850 by Judi Galeana RN  Diversional Activities: television  Goal: Nausea and Vomiting Relief  Outcome: Progressing  Goal: Effective Urinary Elimination  Outcome: Progressing  Goal: Effective Oxygenation and Ventilation  Outcome: Progressing  Intervention: Optimize Oxygenation and Ventilation  Recent Flowsheet Documentation  Taken 7/19/2025 1405 by Judi Galeana RN  Head of Bed (HOB) Positioning: HOB elevated  Taken 7/19/2025 1220 by Judi Galeana RN  Head of Bed (HOB) Positioning: HOB elevated  Taken 7/19/2025 1005 by Judi Galeana RN  Head of Bed (HOB) Positioning: HOB elevated  Taken 7/19/2025 0850 by Judi Galeana RN  Head of Bed (HOB) Positioning: HOB elevated   Goal Outcome Evaluation:

## 2025-07-19 NOTE — PLAN OF CARE
Problem: Adult Inpatient Plan of Care  Goal: Plan of Care Review  Outcome: Progressing  Goal: Patient-Specific Goal (Individualized)  Outcome: Progressing  Goal: Absence of Hospital-Acquired Illness or Injury  Outcome: Progressing  Intervention: Identify and Manage Fall Risk  Recent Flowsheet Documentation  Taken 7/18/2025 2045 by Kathy Hoang RN  Safety Promotion/Fall Prevention:   fall prevention program maintained   activity supervised  Intervention: Prevent Skin Injury  Recent Flowsheet Documentation  Taken 7/19/2025 0005 by Kathy Hoang RN  Skin Protection: incontinence pads utilized  Taken 7/18/2025 2045 by Kathy Hoang RN  Body Position: position maintained  Skin Protection: incontinence pads utilized  Intervention: Prevent and Manage VTE (Venous Thromboembolism) Risk  Recent Flowsheet Documentation  Taken 7/18/2025 2045 by Kathy Hoang RN  VTE Prevention/Management: other (see comments)  Intervention: Prevent Infection  Recent Flowsheet Documentation  Taken 7/19/2025 0005 by Kathy Hoang RN  Infection Prevention: rest/sleep promoted  Taken 7/18/2025 2045 by Kathy Hoang RN  Infection Prevention: rest/sleep promoted  Goal: Optimal Comfort and Wellbeing  Outcome: Progressing  Intervention: Monitor Pain and Promote Comfort  Recent Flowsheet Documentation  Taken 7/18/2025 2045 by Kathy Hoang RN  Pain Management Interventions: quiet environment facilitated  Intervention: Provide Person-Centered Care  Recent Flowsheet Documentation  Taken 7/18/2025 2045 by Kathy Hoang RN  Trust Relationship/Rapport:   care explained   thoughts/feelings acknowledged  Goal: Readiness for Transition of Care  Outcome: Progressing   Goal Outcome Evaluation:

## 2025-07-19 NOTE — CASE MANAGEMENT/SOCIAL WORK
Case Management Discharge Note      Final Note: Pt to dc back to [facility] via [facility]. Report can be called to [phone]. CM has faxed the dc summary. Family in agreement with plan.         Selected Continued Care - Admitted Since 7/15/2025       Destination Coordination complete.      Service Provider Services Address Phone Fax Patient Preferred    [facility] [address], [place] [id number] [phone] --              Durable Medical Equipment    No services have been selected for the patient.                Dialysis/Infusion    No services have been selected for the patient.                Home Medical Care    No services have been selected for the patient.                Therapy    No services have been selected for the patient.                Community Resources    No services have been selected for the patient.                Community & DME    No services have been selected for the patient.                         Final Discharge Disposition Code: 03 - skilled nursing facility (SNF)

## 2025-07-19 NOTE — PROGRESS NOTES
[facility]    Acute pain service Inpatient Progress Note    Patient Name: Chula Chen  :  1931  MRN:  [id number]        Acute Pain  Service Inpatient Progress Note:    Analgesia:Good  Pain Score:0/10  LOC: alert and awake  Side Effects:None  Catheter Site:clean, dry and dressing intact  Cath type: peripheral nerve cath(InfuSystem)  Infusion rate: Fem/ Add: Basal: 1ml/hr, PIB: 8ml q 8h, PCA: 8ml q 30 min  Catheter Plan:RN to remove catheter  Comments: Infusystem empty.

## 2025-07-19 NOTE — PROGRESS NOTES
"          Orthopaedic Surgery Hip Fracture Post-Op Progress Note      LOS: 4 days   Patient Care Team:  Provider, No Known as PCP - General    POD 4    Subjective     Interval History:   Patient doing well this morning.  No major pain.  Was able to take more steps yesterday.  Asking about going back to her home.    Objective     Vital Signs:  Temp (24hrs), Av.1 °F (36.7 °C), Min:97.6 °F (36.4 °C), Max:98.6 °F (37 °C)    /93 (BP Location: Right arm, Patient Position: Lying)   Pulse 87   Temp 97.9 °F (36.6 °C) (Oral)   Resp 16   Ht 149.9 cm (59.02\")   Wt 48.5 kg (106 lb 14.8 oz)   SpO2 94%   BMI 21.58 kg/m²     Labs:  Lab Results (last 24 hours)       ** No results found for the last 24 hours. **            Physical Exam:  EHL, FHL, gastroc soleus, and tibialis anterior are intact  Toes are pink and warm  Surgical dressing is in place  Calf is soft and nontender  No pain with gentle ROM of the hip    Assessment & Plan     Postoperative day number 4 status post cemented right bipolar hemiarthroplasty of the hip for displaced femoral neck fracture.    Pain Control: Good overall.  Fascia iliaca block volume is low so this will need to be pulled prior to discharge.    PT and OT     Weight Bearing Status: Weight-bear as tolerated right lower extremity.  Posterior precautions sub-6 weeks.    DVT prophylaxis: Begin baby aspirin twice daily and continue for 4 weeks    Discharge planning: Anticipate discharge to the [facility] today pending bed availability.  She is ready from an orthopedic standpoint.    Upon discharge, patient will need follow-up with me in the PA clinic in 2-3 weeks' time.  Continue DVTpx for 4 weeks.  Continue Aquacel dressing for 1 week after surgery and then remove and replace with a Covaderm or dry dressing every other day.  Keep incision covered at all times.    Call with questions or concerns.      Salas Arellano MD  25  07:39 EDT    "

## 2025-07-20 NOTE — DISCHARGE PLACEMENT REQUEST
"Chula Chow (89 y.o. Female) Discharge summary with Addendum.   ATT: Skip  Please call Keiry Petty RN with questions.       Date of Birth   1931    Social Security Number       Address   [address] [address]    Home Phone   [phone]    MRN   [id number]       Gnosticist   None    Marital Status                               Admission Date   7/15/2025    Admission Type   Emergency    Admitting Provider   Willie Acuna MD    Attending Provider       Department, Room/Bed   [facility] 3G, S351/1       Discharge Date   2025    Discharge Disposition   Rehab Facility or Unit (DC - External)    Discharge Destination                                 Attending Provider: (none)   Allergies: No Known Allergies    Isolation: None   Infection: None   Code Status: Prior    Ht: 149.9 cm (59.02\")   Wt: 48.5 kg (106 lb 14.8 oz)    Admission Cmt: None   Principal Problem: Hip fracture [S72.009A]                   Active Insurance as of 7/15/2025       Primary Coverage       Payor Plan Insurance Group Employer/Plan Group    [employer] MEDICARE REPLACEMENT [employer] Medicare Advantage [employer] PPO [id number]       Payor Plan Address Payor Plan Phone Number Payor Plan Fax Number Effective Dates    [address] [address]   2025 - None Entered    [facility] [address]         Subscriber Name Subscriber Birth Date Member ID       CHULA CHOW 1931 [id number]                     Emergency Contacts        (Rel.) Home Phone Work Phone Mobile Phone    ZAIDA CHOUDHARY (Daughter) [phone] -- [phone]                 Discharge Summary        Willie Acuna MD at 25 0858              [facility] Medicine Services  DISCHARGE SUMMARY    Patient Name: Chula Chow  : 1931  MRN: [id number]    Date of Admission: 7/15/2025 12:58 AM  Date of Discharge:  25  Primary Care Physician: Provider, No " Known    Consults       No orders found from 6/16/2025 to 7/16/2025.            Hospital Course     Presenting Problem: Fall and right hip fracture    Active Hospital Problems    Diagnosis  POA    **Hip fracture [S72.009A]  Yes      Resolved Hospital Problems   No resolved problems to display.          Hospital Course:  Chula Chen is a 89 y.o. female   with past medical history significant for hypertension, essential tremors, history of COVID, history of right large hiatal hernia, an assisted living facility resident.  Evidently patient was in her usual state of health until  morning of 7/15/25  when she woke up and tried to go to the bathroom using her walker.  In the bathroom evidently she lost the control of the walker and had a fall.      Acute hypoxemia with x-ray showing large hiatal hernia with right hemidiaphragm elevated pushing into mediastinum.  -Chest x-ray also shows some consolidation on the right lower lobe  - Has been on Rocephin and doxycycline.  -Currently patient is on room air and saturating in low 90s.  If patient develops hypoxemia or dyspnea she should use oxygen.  This hiatal hernia and right hemidiaphragm elevation is a significant contributor to the symptoms.  However, patient is not going to tolerate or agree with surgical procedure.     Status post fall and right hip fracture  - X-ray of the right hip shows subcapital right femoral neck fracture  - S/p surgery by Dr. Arellano with no immediate complications.  -Patient is to be anticoagulated with 81 mg aspirin twice daily for a month     Low blood pressure  -We gave 500 cc bolus of normal saline.  Will also start the patient on midodrine to prevent very low pressure.    - Currently blood pressure is much better controlled.  Will discontinue midodrine for discharge     Difficulty with urination  - UA shows large leukocyte Estrace  - Urine culture growing E. Coli, patient treated with Rocephin     Depression  - Patient is on Celexa  and also clonazepam at home.  Will continue the home medication and monitor     Essential tremors  - Patient is on primidone at home which we will continue.      Discharge Follow Up Recommendations for outpatient labs/diagnostics:       Day of Discharge     HPI:   Resting in bed no acute distress and feels okay except she is a little more depressed this morning.  No fever or chills.  No chest pain or palpitation.  No nausea or vomiting.  Patient's daughter is also present at the bedside and I explained the plan of care to her.    Review of Systems  As above    Vital Signs:   Temp:  [97.6 °F (36.4 °C)-98.6 °F (37 °C)] 97.8 °F (36.6 °C)  Heart Rate:  [81-99] 87  Resp:  [16] 16  BP: (134-164)/(65-93) 159/85  Flow (L/min) (Oxygen Therapy):  [1-3] 1      Physical Exam:  Constitutional: No acute distress, looks comfortable  HENT: NCAT, mucous membranes moist  Respiratory: Clear to auscultation bilaterally, respiratory effort normal, currently on room air.  Cardiovascular: RRR, no murmurs, rubs, or gallops  Gastrointestinal: Positive bowel sounds, soft, nontender, nondistended  Musculoskeletal: No bilateral ankle edema  Psychiatric: Flat affect, cooperative  Neurologic: Awake, alert, follows commands,  speech clear  Skin: No rashes     Pertinent  and/or Most Recent Results     LAB RESULTS:      Lab 07/19/25  0700 07/17/25  0615 07/16/25  0836 07/15/25  0248   WBC 10.90* 14.15* 11.99* 11.97*   HEMOGLOBIN 10.0* 9.5* 10.2* 13.3   HEMATOCRIT 31.1* 28.9* 31.1* 40.2   PLATELETS 384 308 319 432   NEUTROS ABS 6.76 9.09* 9.31* 8.06*   IMMATURE GRANS (ABS) 0.06* 0.07* 0.04 0.05   LYMPHS ABS 2.70 3.07 1.63 2.69   MONOS ABS 1.17* 1.69* 0.86 0.93*   EOS ABS 0.17 0.20 0.12 0.21   MCV 84.7 85.8 83.8 83.4         Lab 07/19/25  0700 07/16/25  0836 07/15/25  1105 07/15/25  0248   SODIUM 135* 138  --  134*   POTASSIUM 2.9* 3.7  --  3.8   CHLORIDE 100 105  --  100   CO2 25.0 22.1  --  22.0   ANION GAP 10.0 10.9  --  12.0   BUN 11.2 9.9  --   18.5   CREATININE 0.31* 0.53*  --  0.49*   EGFR 97.7 85.8  --  87.5   GLUCOSE 121* 170*  --  125*   CALCIUM 7.9* 8.0*  --  9.2   MAGNESIUM  --   --  2.0  --    PHOSPHORUS  --   --  2.5  --          Lab 07/15/25  0248   TOTAL PROTEIN 6.9   ALBUMIN 3.3*   GLOBULIN 3.6   ALT (SGPT) 87*   AST (SGOT) 36*   BILIRUBIN 0.2   ALK PHOS 152*                     Brief Urine Lab Results  (Last result in the past 365 days)        Color   Clarity   Blood   Leuk Est   Nitrite   Protein   CREAT   Urine HCG        07/15/25 0958 Yellow   Turbid   Moderate (2+)   Large (3+)   Negative   30 mg/dL (1+)                 Microbiology Results (last 10 days)       Procedure Component Value - Date/Time    Urine Culture - Urine, Straight Cath [[id number]]  (Abnormal)  (Susceptibility) Collected: 07/15/25 0958    Lab Status: Final result Specimen: Urine from Straight Cath Updated: 07/17/25 1001     Urine Culture >100,000 CFU/mL Escherichia coli    Narrative:      Colonization of the urinary tract without infection is common. Treatment is discouraged unless the patient is symptomatic, pregnant, or undergoing an invasive urologic procedure.    Susceptibility        Escherichia coli      JAYJAY      Amoxicillin + Clavulanate Susceptible      Ampicillin Susceptible      Ampicillin + Sulbactam Susceptible      Cefazolin (Urine) Susceptible      Cefepime Susceptible      Ceftazidime Susceptible      Ceftriaxone Susceptible      Cefuroxime axetil Susceptible      Ciprofloxacin Resistant      Gentamicin Susceptible      Levofloxacin Resistant      Nitrofurantoin Susceptible      Piperacillin + Tazobactam Susceptible      Trimethoprim + Sulfamethoxazole Susceptible                                   XR Chest 1 View  Result Date: 7/17/2025  XR CHEST 1 VW Date of Exam: 7/17/2025 2:16 PM EDT Indication: SOB Comparison: 7/6/2023 Findings: Cardiac size is stable. Vascular markings are normal. There is marked elevation of the right hemidiaphragm with a large  right-sided diaphragmatic hernia. There is increased consolidation in the right lung base compared with the prior study. The left lung  appears clear.     Impression: Marked elevation of the right hemidiaphragm with a large right-sided diaphragmatic hernia. There is increased consolidation in the right lung base compared with the prior study. Electronically Signed: Gerry Milian MD  7/17/2025 2:52 PM EDT  Workstation ID: [id number]    XR Hip With or Without Pelvis 2 - 3 View Right  Result Date: 7/15/2025  XR HIP W OR WO PELVIS 2-3 VIEW RIGHT Date of Exam: 7/15/2025 6:50 PM EDT Indication: Post-Op Hip Arthroplasty Comparison: Hip and pelvis radiographs 7/15/2025 Findings: Interval right hip arthroplasty without radiographic evidence of immediate complication. Surrounding soft tissue edema and subcutaneous emphysema, favored postoperative. Degenerative changes of the sacroiliac joints, left hip, and pubic symphysis appear unchanged.     Impression: Interval right hip arthroplasty without radiographic evidence of immediate complication. Electronically Signed: Andrez Martinez MD  7/15/2025 7:12 PM EDT  Workstation ID: [id number]    RIGHt FI cath  Result Date: 7/15/2025  Oren Wiggins CRNA     7/15/2025  1:22 PM RIGHt FI cath Patient reassessed immediately prior to procedure Reason for block: at surgeon's request and post-op pain management Performed by CRNA/CAA: Oren Wiggins CRNA Assisted by: Genevieve Floyd RN Preanesthetic Checklist Completed: patient identified, IV checked, site marked, risks and benefits discussed, surgical consent, monitors and equipment checked, pre-op evaluation and timeout performed Prep: Pt Position: supine Sterile barriers:cap, gloves, mask and washed/disinfected hands Prep: ChloraPrep Patient monitoring: blood pressure monitoring, continuous pulse oximetry and EKG Procedure Sedation: no Performed under: local infiltration Guidance:ultrasound guided ULTRASOUND INTERPRETATION.  Using ultrasound  guidance a 20 G gauge needle was placed in close proximity to the nerve, at which point, under ultrasound guidance anesthetic was injected in the area of the nerve and spread of the anesthesia was seen on ultrasound in close proximity thereto.  There were no abnormalities seen on ultrasound; a digital image was taken; and the patient tolerated the procedure with no complications. Images:still images obtained, printed/placed on chart Laterality:right Block Type:fascia iliaca compartment Injection Technique:catheter Needle Type:echogenic and Tuohy Needle Gauge:18 G Resistance on Injection: none Catheter Size:20 G (20g) Cath Depth at skin: 11 cm Post Assessment Injection Assessment: negative aspiration for heme, no paresthesia on injection and incremental injection Patient Tolerance:comfortable throughout block Complications:no Additional Notes CKAFASCIAILIACA: CATHETER A high-frequency linear transducer, with sterile cover, was placed in parasagittal plane on top of the Anterior Superior Iliac Spine (ASIS) and moved medially to identify the Internal Oblique muscle, Sartorius muscle, Iliacus Muscle, Fascia Iliaca (FI) and Fascia Latae. The insertion site was prepped and draped in sterile fashion. Skin and cutaneous tissue was infiltrated with 2-5 ml of 1% Lidocaine. Using ultrasound-guidance, an 18-gauge Contiplex Ultra 360 Touhy needle was advanced in plane from caudad to cephalad. Preservative-free normal saline was utilized for hydro-dissection of tissue, advancement of Touhy, and to confirm final needle placement below FI. Local anesthetic in incremental 3-5 ml injections. Aspiration every 5 ml to prevent intravascular injection. Injection was completed with negative aspiration of blood and negative intravascular injection. Injection pressures were normal with minimal resistance. A 20-gauge Contiplex Echo catheter was placed through the needle and advance out the tip of the Touhy 3-5 cm. The Touhy needle was then  "removed, and final catheter position verified below the FI. The catheter was secured in the usual fashion with skin glue, benzoin, steri-strips, CHG tegaderm and Label noting \"Nerve Block Catheter\". Jerk tape applied at yellow connector and catheter connection. Performed by: Oren Wiggins CRNA     CT Head Without Contrast  Result Date: 7/15/2025  CT HEAD WO CONTRAST Date of Exam: 7/15/2025 1:55 AM EDT Indication: head injury. Comparison: None available. Technique: Axial CT images were obtained of the head without contrast administration.  Automated exposure control and iterative construction methods were used. Findings: There is mild diffuse generalized atrophy. There are low-attenuation areas in the periventricular white matter consistent with chronic microvascular ischemic change. There is no mass, mass effect or midline shift. There are no abnormal extra-axial fluid collections or areas of acute hemorrhage. There is mild left maxillary sinus disease. The mastoid air cells are clear. There is a skin laceration in the right lateral frontal region with no radiopaque foreign body or underlying bony abnormality.     Impression: Atrophy and chronic microvascular ischemic change. No acute intracranial process. Electronically Signed: Dav Reed MD  7/15/2025 2:41 AM EDT  Workstation ID: [id number]    XR Hip With or Without Pelvis 2 - 3 View Right  Result Date: 7/15/2025  XR HIP W OR WO PELVIS 2-3 VIEW RIGHT Date of Exam: 7/15/2025 1:40 AM EDT Indication: hip injury Comparison: None available. Findings: There is an abnormal appearance at the subcapital portion of the right femur suspected to represent a fracture. There is bilateral hip arthritic change. There are no lytic or destructive bony lesions.     Impression: Suspected subcapital right femoral neck fracture. Electronically Signed: Dav Reed MD  7/15/2025 2:23 AM EDT  Workstation ID: [id number]              Results for orders placed during the hospital encounter of " 07/15/25    Adult Transthoracic Echo Complete W/ Cont if Necessary Per Protocol 07/17/2025  4:55 PM    Interpretation Summary    Left ventricular systolic function is normal. Left ventricular ejection fraction appears to be 61 - 65%.    Left ventricular wall thickness is consistent with mild concentric hypertrophy.    There is moderate calcification of the aortic valve.    Moderate mitral annular calcification is present.      Plan for Follow-up of Pending Labs/Results:     Discharge Details        Discharge Medications        Continue These Medications        Instructions Start Date   acetaminophen 325 MG tablet  Commonly known as: TYLENOL   325 mg, Oral, Every 6 Hours PRN      benzonatate 100 MG capsule  Commonly known as: TESSALON   100 mg, Oral, 3 Times Daily PRN      bimatoprost 0.01 % ophthalmic drops  Commonly known as: LUMIGAN   1 drop, Both Eyes, Nightly      calcium carbonate 500 MG chewable tablet  Commonly known as: TUMS   1 tablet, Oral, 3 Times Daily PRN, OTC      cholecalciferol 25 MCG (1000 UT) tablet  Commonly known as: VITAMIN D3   1,000 Units, Oral, Daily, OTC      citalopram 40 MG tablet  Commonly known as: CeleXA   20 mg, Oral, Daily      clonazePAM 0.5 MG tablet  Commonly known as: KlonoPIN   0.5 mg, Oral, Nightly PRN      docusate sodium 100 MG capsule  Commonly known as: COLACE   100 mg, Oral, 2 Times Daily, OTC      dorzolamide-timolol 2-0.5 % ophthalmic solution  Commonly known as: COSOPT   1 drop, Both Eyes, 2 Times Daily      fluticasone 50 MCG/ACT nasal spray  Commonly known as: FLONASE   2 sprays, Nasal, Daily, OTC      guaiFENesin 600 MG 12 hr tablet  Commonly known as: MUCINEX   600 mg, Oral, 2 Times Daily, OTC      Loratadine 10 MG capsule   10 mg, Oral, Daily, OTC      Multivitamin Adults tablet tablet  Generic drug: multivitamin with minerals   1 tablet, Oral, Daily, OTC      olopatadine 0.2 % solution ophthalmic solution  Commonly known as: PATADAY   1 drop, Right Eye, Daily       ondansetron ODT 4 MG disintegrating tablet  Commonly known as: ZOFRAN-ODT   4 mg, Translingual, 4 Times Daily PRN      primidone 50 MG tablet  Commonly known as: MYSOLINE   50 mg, Oral, 3 Times Daily      traZODone 50 MG tablet  Commonly known as: DESYREL    ASA 81 MG tablet    Doxycyclin 100 mg    Hydrocodone-acetaminophen      Tramadol 50 mg      50 mg, Oral, Nightly      81 mg PO BID for 30 days    Po q 12 hours 5 doses    5-325 mg po q 6hours PRN for severe pain    50 mg po q 6 hours prn for pain                         No Known Allergies      Discharge Disposition:      Diet:  Hospital:  Diet Order   Procedures    Diet: Regular/House; Texture: Soft to Chew (NDD 3); Soft to Chew: Chopped Meat; Fluid Consistency: Thin (IDDSI 0)            Activity:  As per instruction of orthopedic surgery    Restrictions or Other Recommendations:         CODE STATUS:    Code Status and Medical Interventions: CPR (Attempt to Resuscitate); Full Support   Ordered at: 07/15/25 6300     Code Status (Patient has no pulse and is not breathing):    CPR (Attempt to Resuscitate)     Medical Interventions (Patient has pulse or is breathing):    Full Support     Level Of Support Discussed With:    Patient       No future appointments.              Willie Acuna MD  07/19/25      Time Spent on Discharge:  I spent  40  minutes on this discharge activity which included: face-to-face encounter with the patient, reviewing the data in the system, coordination of the care with the nursing staff as well as consultants, documentation, and entering orders.           Electronically signed by Willie Acuna MD at 07/20/25 6544

## 2025-07-21 ENCOUNTER — TELEPHONE (OUTPATIENT)
Dept: ORTHOPEDIC SURGERY | Facility: CLINIC | Age: OVER 89
End: 2025-07-21
Payer: MEDICARE

## 2025-07-21 NOTE — TELEPHONE ENCOUNTER
PATIENT'S DAUGHTER CALLED IN TO SCHEDULE POST OP VISIT. SHE IS WANTING TO KNOW IF THIS CAN BE A MYCHART VISIT? SHE HAD SURGERY WITH DR. LERNER ON HER RIGHT HIP ON 07/15, AFTER BEING SEEN IN THE ED.     IF CLINICAL STAFF COULD PLEASE ADVISE.     CALL BACK # [phone]

## 2025-07-30 ENCOUNTER — PATIENT MESSAGE (OUTPATIENT)
Dept: ORTHOPEDIC SURGERY | Facility: CLINIC | Age: OVER 89
End: 2025-07-30
Payer: MEDICARE

## 2025-08-04 ENCOUNTER — TELEPHONE (OUTPATIENT)
Dept: ORTHOPEDIC SURGERY | Facility: CLINIC | Age: OVER 89
End: 2025-08-04
Payer: MEDICARE

## 2025-08-07 ENCOUNTER — OFFICE VISIT (OUTPATIENT)
Dept: ORTHOPEDIC SURGERY | Facility: CLINIC | Age: OVER 89
End: 2025-08-07
Payer: MEDICARE

## 2025-08-07 VITALS — TEMPERATURE: 96.9 F

## 2025-08-07 DIAGNOSIS — Z96.649 S/P HIP HEMIARTHROPLASTY: Primary | ICD-10-CM

## 2025-08-08 ENCOUNTER — TELEPHONE (OUTPATIENT)
Dept: ORTHOPEDIC SURGERY | Facility: CLINIC | Age: OVER 89
End: 2025-08-08
Payer: MEDICARE

## (undated) DEVICE — SUT ETHIB 0/0 MO6 I8IN CX45D

## (undated) DEVICE — ELECTRD BLD EZ CLN STD 6.5IN

## (undated) DEVICE — COAXIAL FEMORAL CANAL TIP

## (undated) DEVICE — HANDPIECE SET WITH HIGH FLOW TIP AND SUCTION TUBE: Brand: INTERPULSE

## (undated) DEVICE — PENCL SMOKE/EVAC MEGADYNE TELESCP 10FT

## (undated) DEVICE — PULLOVER TOGA, 2X LARGE: Brand: FLYTE, SURGICOOL

## (undated) DEVICE — 450 ML BOTTLE OF 0.05% CHLORHEXIDINE GLUCONATE IN 99.95% STERILE WATER FOR IRRIGATION, USP AND APPLICATOR.: Brand: IRRISEPT ANTIMICROBIAL WOUND LAVAGE

## (undated) DEVICE — SUT MONOCRYL PLS ANTIB UND 3/0  PS1 27IN

## (undated) DEVICE — STOCKINETTE,IMPERVIOUS,12X48,STERILE: Brand: MEDLINE

## (undated) DEVICE — PATIENT RETURN ELECTRODE, SINGLE-USE, CONTACT QUALITY MONITORING, ADULT, WITH 9FT CORD, FOR PATIENTS WEIGING OVER 33LBS. (15KG): Brand: MEGADYNE

## (undated) DEVICE — SYR LUERLOK 30CC

## (undated) DEVICE — DRSNG GZ CURAD XEROFORM NONADHR OVERWRAP 5X9IN

## (undated) DEVICE — ANTIBACTERIAL UNDYED BRAIDED (POLYGLACTIN 910), SYNTHETIC ABSORBABLE SUTURE: Brand: COATED VICRYL

## (undated) DEVICE — GLV SURG PREMIERPRO MIC LTX PF SZ8 BRN

## (undated) DEVICE — SYS MIX CLEARMIX SGL/DBL VAC

## (undated) DEVICE — NEEDLE,HYPODERM,SAFETY,18GX1.5: Brand: MEDLINE

## (undated) DEVICE — DRSNG SURG AQUACEL AG/ADVNTGE 9X25CM 3.5X10IN

## (undated) DEVICE — INTENDED USE FOR SURGICAL MARKING ON INTACT SKIN, ALSO PROVIDES A PERMANENT METHOD OF IDENTIFYING OBJECTS IN THE OPERATING ROOM: Brand: WRITESITE® REGULAR TIP SKIN MARKER

## (undated) DEVICE — BLANKT WARM UPPR/BDY ARM/OUT 57X196CM

## (undated) DEVICE — TRAP FLD MINIVAC MEGADYNE 100ML

## (undated) DEVICE — MICRO HVTSA, 0.5G AND HVTSA SOURCEMARK PRODUCT CODE M1206 AND M1206-01: Brand: EXOFIN MICRO HVTSA, 0.5G

## (undated) DEVICE — PK HIP TOTL UNIV 10

## (undated) DEVICE — DRAPE,U/ SHT,SPLIT,PLAS,STERIL: Brand: MEDLINE

## (undated) DEVICE — COVER,MAYO STAND,STERILE: Brand: MEDLINE

## (undated) DEVICE — RECIPROCATING BLADE HEAVY DUTY LONG, OFFSET  (77.6 X 0.77 X 11.2MM)